# Patient Record
Sex: FEMALE | Race: BLACK OR AFRICAN AMERICAN | NOT HISPANIC OR LATINO | Employment: OTHER | URBAN - METROPOLITAN AREA
[De-identification: names, ages, dates, MRNs, and addresses within clinical notes are randomized per-mention and may not be internally consistent; named-entity substitution may affect disease eponyms.]

---

## 2017-01-19 ENCOUNTER — HOSPITAL ENCOUNTER (OUTPATIENT)
Facility: MEDICAL CENTER | Age: 72
End: 2017-01-19
Attending: NEUROLOGICAL SURGERY | Admitting: NEUROLOGICAL SURGERY
Payer: MEDICARE

## 2017-01-27 ENCOUNTER — HOSPITAL ENCOUNTER (OUTPATIENT)
Dept: RADIOLOGY | Facility: MEDICAL CENTER | Age: 72
End: 2017-01-27
Attending: NEUROLOGICAL SURGERY
Payer: MEDICARE

## 2017-01-27 ENCOUNTER — APPOINTMENT (OUTPATIENT)
Dept: ADMISSIONS | Facility: MEDICAL CENTER | Age: 72
End: 2017-01-27
Payer: MEDICARE

## 2017-01-27 VITALS — HEIGHT: 65 IN | BODY MASS INDEX: 23.84 KG/M2 | WEIGHT: 143.08 LBS

## 2017-01-27 DIAGNOSIS — Z01.812 PRE-PROCEDURAL LABORATORY EXAMINATION: ICD-10-CM

## 2017-01-27 DIAGNOSIS — Z01.811 PRE-OPERATIVE RESPIRATORY EXAMINATION: ICD-10-CM

## 2017-01-27 DIAGNOSIS — Z01.810 PRE-OPERATIVE CARDIOVASCULAR EXAMINATION: ICD-10-CM

## 2017-01-27 LAB
25(OH)D3 SERPL-MCNC: 27 NG/ML (ref 30–100)
ANION GAP SERPL CALC-SCNC: 6 MMOL/L (ref 0–11.9)
APPEARANCE UR: CLEAR
APTT PPP: 24.3 SEC (ref 24.7–36)
BASOPHILS # BLD AUTO: 1 % (ref 0–1.8)
BASOPHILS # BLD: 0.04 K/UL (ref 0–0.12)
BILIRUB UR QL STRIP.AUTO: NEGATIVE
BUN SERPL-MCNC: 13 MG/DL (ref 8–22)
CALCIUM SERPL-MCNC: 9 MG/DL (ref 8.5–10.5)
CHLORIDE SERPL-SCNC: 103 MMOL/L (ref 96–112)
CO2 SERPL-SCNC: 25 MMOL/L (ref 20–33)
COLOR UR: COLORLESS
CREAT SERPL-MCNC: 0.94 MG/DL (ref 0.5–1.4)
CULTURE IF INDICATED INDCX: NO UA CULTURE
EKG IMPRESSION: NORMAL
EOSINOPHIL # BLD AUTO: 0.18 K/UL (ref 0–0.51)
EOSINOPHIL NFR BLD: 4.5 % (ref 0–6.9)
ERYTHROCYTE [DISTWIDTH] IN BLOOD BY AUTOMATED COUNT: 40.6 FL (ref 35.9–50)
GFR SERPL CREATININE-BSD FRML MDRD: 59 ML/MIN/1.73 M 2
GLUCOSE SERPL-MCNC: 281 MG/DL (ref 65–99)
GLUCOSE UR STRIP.AUTO-MCNC: >1000 MG/DL
HCT VFR BLD AUTO: 38.6 % (ref 37–47)
HGB BLD-MCNC: 13.2 G/DL (ref 12–16)
IMM GRANULOCYTES # BLD AUTO: 0.01 K/UL (ref 0–0.11)
IMM GRANULOCYTES NFR BLD AUTO: 0.2 % (ref 0–0.9)
INR PPP: 1 (ref 0.87–1.13)
KETONES UR STRIP.AUTO-MCNC: NEGATIVE MG/DL
LEUKOCYTE ESTERASE UR QL STRIP.AUTO: NEGATIVE
LYMPHOCYTES # BLD AUTO: 1.7 K/UL (ref 1–4.8)
LYMPHOCYTES NFR BLD: 42.3 % (ref 22–41)
MCH RBC QN AUTO: 31.4 PG (ref 27–33)
MCHC RBC AUTO-ENTMCNC: 34.2 G/DL (ref 33.6–35)
MCV RBC AUTO: 91.9 FL (ref 81.4–97.8)
MICRO URNS: ABNORMAL
MONOCYTES # BLD AUTO: 0.34 K/UL (ref 0–0.85)
MONOCYTES NFR BLD AUTO: 8.5 % (ref 0–13.4)
NEUTROPHILS # BLD AUTO: 1.75 K/UL (ref 2–7.15)
NEUTROPHILS NFR BLD: 43.5 % (ref 44–72)
NITRITE UR QL STRIP.AUTO: NEGATIVE
NRBC # BLD AUTO: 0 K/UL
NRBC BLD AUTO-RTO: 0 /100 WBC
PH UR STRIP.AUTO: 7 [PH]
PLATELET # BLD AUTO: 298 K/UL (ref 164–446)
PMV BLD AUTO: 11.3 FL (ref 9–12.9)
POTASSIUM SERPL-SCNC: 3.8 MMOL/L (ref 3.6–5.5)
PROT UR QL STRIP: NEGATIVE MG/DL
PROTHROMBIN TIME: 13.5 SEC (ref 12–14.6)
RBC # BLD AUTO: 4.2 M/UL (ref 4.2–5.4)
RBC UR QL AUTO: NEGATIVE
SODIUM SERPL-SCNC: 134 MMOL/L (ref 135–145)
SP GR UR STRIP.AUTO: 1.02
WBC # BLD AUTO: 4 K/UL (ref 4.8–10.8)

## 2017-01-27 PROCEDURE — 82306 VITAMIN D 25 HYDROXY: CPT

## 2017-01-27 PROCEDURE — 85610 PROTHROMBIN TIME: CPT

## 2017-01-27 PROCEDURE — 85025 COMPLETE CBC W/AUTO DIFF WBC: CPT

## 2017-01-27 PROCEDURE — 85730 THROMBOPLASTIN TIME PARTIAL: CPT

## 2017-01-27 PROCEDURE — 80048 BASIC METABOLIC PNL TOTAL CA: CPT

## 2017-01-27 PROCEDURE — 81003 URINALYSIS AUTO W/O SCOPE: CPT

## 2017-01-27 PROCEDURE — 36415 COLL VENOUS BLD VENIPUNCTURE: CPT

## 2017-01-27 PROCEDURE — 71010 DX-CHEST-LIMITED (1 VIEW): CPT

## 2017-01-27 RX ORDER — LOVASTATIN 40 MG/1
40 TABLET ORAL NIGHTLY
COMMUNITY
End: 2017-02-03 | Stop reason: HOSPADM

## 2017-01-27 RX ORDER — GABAPENTIN 300 MG/1
300 CAPSULE ORAL 2 TIMES DAILY
COMMUNITY
End: 2017-01-27

## 2017-01-27 RX ORDER — SAXAGLIPTIN 5 MG/1
5 TABLET, FILM COATED ORAL DAILY
COMMUNITY
End: 2017-01-27

## 2017-01-27 RX ORDER — AMOXICILLIN AND CLAVULANATE POTASSIUM 875; 125 MG/1; MG/1
1 TABLET, FILM COATED ORAL 2 TIMES DAILY
COMMUNITY
End: 2017-01-27

## 2017-01-27 RX ORDER — PANTOPRAZOLE SODIUM 40 MG/1
40 TABLET, DELAYED RELEASE ORAL DAILY
COMMUNITY
End: 2017-02-03 | Stop reason: HOSPADM

## 2017-04-25 ENCOUNTER — OFFICE VISIT (OUTPATIENT)
Dept: ENDOCRINOLOGY | Facility: MEDICAL CENTER | Age: 72
End: 2017-04-25
Payer: MEDICARE

## 2017-04-25 VITALS
WEIGHT: 139 LBS | HEIGHT: 65 IN | OXYGEN SATURATION: 97 % | BODY MASS INDEX: 23.16 KG/M2 | HEART RATE: 108 BPM | SYSTOLIC BLOOD PRESSURE: 102 MMHG | DIASTOLIC BLOOD PRESSURE: 64 MMHG

## 2017-04-25 DIAGNOSIS — E11.00 UNCONTROLLED TYPE 2 DIABETES MELLITUS WITH HYPEROSMOLARITY WITHOUT COMA, WITHOUT LONG-TERM CURRENT USE OF INSULIN (HCC): ICD-10-CM

## 2017-04-25 DIAGNOSIS — E78.5 TYPE 2 DIABETES MELLITUS WITH HYPERLIPIDEMIA (HCC): ICD-10-CM

## 2017-04-25 DIAGNOSIS — E11.69 TYPE 2 DIABETES MELLITUS WITH HYPERLIPIDEMIA (HCC): ICD-10-CM

## 2017-04-25 PROCEDURE — G8420 CALC BMI NORM PARAMETERS: HCPCS | Performed by: PHYSICIAN ASSISTANT

## 2017-04-25 PROCEDURE — 3017F COLORECTAL CA SCREEN DOC REV: CPT | Performed by: PHYSICIAN ASSISTANT

## 2017-04-25 PROCEDURE — 1036F TOBACCO NON-USER: CPT | Performed by: PHYSICIAN ASSISTANT

## 2017-04-25 PROCEDURE — 3046F HEMOGLOBIN A1C LEVEL >9.0%: CPT | Mod: 8P | Performed by: PHYSICIAN ASSISTANT

## 2017-04-25 PROCEDURE — 1101F PT FALLS ASSESS-DOCD LE1/YR: CPT | Mod: 8P | Performed by: PHYSICIAN ASSISTANT

## 2017-04-25 PROCEDURE — 3014F SCREEN MAMMO DOC REV: CPT | Mod: 8P | Performed by: PHYSICIAN ASSISTANT

## 2017-04-25 PROCEDURE — 4040F PNEUMOC VAC/ADMIN/RCVD: CPT | Mod: 8P | Performed by: PHYSICIAN ASSISTANT

## 2017-04-25 PROCEDURE — 99214 OFFICE O/P EST MOD 30 MIN: CPT | Performed by: PHYSICIAN ASSISTANT

## 2017-04-25 RX ORDER — LOVASTATIN 40 MG/1
40 TABLET ORAL NIGHTLY
Status: ON HOLD | COMMUNITY
End: 2017-11-08

## 2017-04-25 RX ORDER — SAXAGLIPTIN AND METFORMIN HYDROCHLORIDE 1000; 5 MG/1; MG/1
TABLET, FILM COATED, EXTENDED RELEASE ORAL
COMMUNITY
End: 2017-05-03

## 2017-04-25 RX ORDER — PANTOPRAZOLE SODIUM 40 MG/1
40 TABLET, DELAYED RELEASE ORAL EVERY MORNING
COMMUNITY
End: 2018-08-13

## 2017-04-25 NOTE — PATIENT INSTRUCTIONS
Blood glucose log: Check BG in the morning when wake up, before lunch or dinner and before bed.  So three times a day.  Always bring BG diary to the next office visit.     STOP:  Kombiglyza  Glipizide    Start:  Xigduo one in AM one in PM  Victoza 0.6 at night

## 2017-04-25 NOTE — PROGRESS NOTES
New Patient Consult Note  Referred by: Parveen Barahona M.D.    Reason for consult: Diabetes Management Type 2    HPI:  Margy Boyer is a 72 y.o. old patient who is seeing us today for diabetes care.  This is a pleasant patient with diabetes and I appreciate the opportunity to participate in the care of this patient.  This is a new patient with me today.    2/12/17 HbA1c 12.9,  GFR 59    BG Diary:4/25/2017  In the AM: 135, 116, 123, 115, 142, 132, 124  Just before meal: 137, 216, 179, 59, 120, 138,   Just before Bed: 150, 280, 207, 218, 193, 174   Has been Diabetic [id number]  Has a Glucagon pen at home: no    Meter is a Freestyle.    1. Uncontrolled type 2 diabetes mellitus with hyperosmolarity without coma, without long-term current use of insulin (CMS-HCC)  She is new and is on   Kombiglyze XR 5/1000  Glipizide 5    STOP:  Kombiglyza  Glipizide    Start:  Xigduo one in AM one in PM  Victoza 0.6 at night      ROS:   Constitutional: No change in weight , No fatigue, No night sweats.  HEENT: No Headache.  Eyes:  No blurred vision, No visual changes.  Cardiac: No chest pain, No palpitations.  Resp: No shortness of breath, No cough,   Gastro: No nausea or vomiting, No diarrhea.  Neuro: Denies numbness or tinging in bilateral feet or hands, and no loss of sensation.  Endo: No heat or cold intolerance.  : + polyuria, No polydipsia, No chronic UTI's.  Lower extremities: No lower leg edema bilateral.  All other systems were reviewed and were negative.    Past Medical History:  Patient Active Problem List    Diagnosis Date Noted   • Diabetes mellitus type II, uncontrolled (CMS-HCC) 04/25/2017   • Type 2 diabetes mellitus with hyperlipidemia (CMS-HCC) 04/25/2017   • Chronic low back pain 09/29/2016   • DDD (degenerative disc disease), lumbar 09/29/2016   • Spondylosis of lumbosacral region without myelopathy or radiculopathy 09/29/2016   • Chronic neck pain 09/29/2016   • Osteoarthritis of spine with radiculopathy,  cervical region 09/29/2016   • DDD (degenerative disc disease), cervical 09/29/2016   • Muscle weakness of left upper extremity 09/29/2016   • Cervical stenosis of spinal canal 09/29/2016   • Left hand pain 06/10/2015   • Contracture of joint of hand 01/05/2015   • Fracture of metacarpal bone 12/23/2013       Past Surgical History:  Past Surgical History   Procedure Laterality Date   • Gyn surgery       tubal ligation   • Other orthopedic surgery  ?2012     Right knee       Allergies:  Asa; Celecoxib; Codeine; Eggs; Pcn; Tetracycline; and Vioxx    Social History:  Social History     Social History   • Marital Status: Single     Spouse Name: N/A   • Number of Children: N/A   • Years of Education: N/A     Occupational History   • Not on file.     Social History Main Topics   • Smoking status: Never Smoker    • Smokeless tobacco: Never Used   • Alcohol Use: No   • Drug Use: No   • Sexual Activity: Not on file     Other Topics Concern   • Not on file     Social History Narrative       Family History:  History reviewed. No pertinent family history.    Medications:    Current outpatient prescriptions:   •  lovastatin (MEVACOR) 40 MG tablet, Take 40 mg by mouth every evening., Disp: , Rfl:   •  Saxagliptin-Metformin (KOMBIGLYZE XR) 5-1000 MG TABLET SR 24 HR, Take  by mouth., Disp: , Rfl:   •  pantoprazole (PROTONIX) 40 MG Tablet Delayed Response, Take 40 mg by mouth every day., Disp: , Rfl:   •  glipiZIDE (GLUCOTROL) 5 MG Tab, Take 5 mg by mouth 2 times a day., Disp: , Rfl:   •  fluticasone-salmeterol (ADVAIR) 250-50 MCG/DOSE AEROSOL POWDER, BREATH ACTIVATED, Inhale 1 Puff by mouth every 12 hours., Disp: , Rfl:   •  losartan-hydrochlorothiazide (HYZAAR) 100-12.5 MG per tablet, Take 1 Tab by mouth every day., Disp: , Rfl:   •  albuterol (VENTOLIN OR PROVENTIL) 108 (90 BASE) MCG/ACT Aero Soln inhalation aerosol, Inhale 2 Puffs by mouth every 6 hours as needed for Shortness of Breath., Disp: 8.5 g, Rfl: 3  •  metformin  "(GLUCOPHAGE) 500 MG TABS, Take  by mouth. 1000 mg with morning and evening meals, and 500 mg at noon, Disp: , Rfl:       Physical Examination:   Vital signs: /64 mmHg  Pulse 108  Ht 1.651 m (5' 5\")  Wt 63.05 kg (139 lb)  BMI 23.13 kg/m2  SpO2 97%  General: No distress, cooperative, well dressed and well nourished.   Eyes: No scleral icterus or discharge, No hyposphagma  ENMT: Normal on external inspection of nose, lips, No nasal drainage   Neck: No abnormal masses on inspection  Resp: Normal effort, Bilateral clear to auscultation, No wheezing, No rales  CVS: Regular rate and rhythm, S1 S2 normal, No murmur. No gallop  Extremities: No edema bilateral extremities  Neuro: Alert and oriented  Skin: No rash, No Ulcers  Psych: Normal mood and affect  Foot exam: normal sensation to monofilament testing, normal pulses, no ulcers.  Normal Vibration quantitative sensation test.    Assessment and Plan:    1. Uncontrolled type 2 diabetes mellitus with hyperosmolarity without coma, without long-term current use of insulin (CMS-HCC)  STOP:  Kombiglyza  Glipizide    Start:  Xigduo one in AM one in PM  Victoza 0.6 at night    I may or may not get her on Actos depending on her numbers next week.    She also states she needs to get her Diabetes under better control so she can do surgery on her neck.       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   They also have polyuria which precludes them to move toward renal failure and possible dialysis.  I discussed today with this patient that the leading cause of adult blindness and renal failure and the need for dialysis is uncontrolled diabetes and a glucose-toxic state.  I explained to them the need to get a better handle of their diabetes, because we want to avoid complications if at all possible.   The total time spent seeing this " patient today face to face in consultation, and formulating an action plan for this visit was greater than 40 minutes. > Than 50% of this time was spent counseling, discussing problems documented above and below, coordinating care and answering questions by the physician assistant.  We developed a diabetes care plan for this patient today.        Return in about 1 week (around 5/2/2017).    Blood glucose log: Check BG in the morning when wake up, before lunch or dinner and before bed.  So three times a day.  Always bring BG diary to the next office visit.     This patient during there office visit was started on new medication Victoza, Xigduo.  Side effects of new medications were discussed with the patient today in the office. The patient was supplied paperwork on this new medication.    Thank you kindly for allowing me to participate in the diabetes care plan for this patient.    Martin Leblanc PA-C, [facility]  04/25/2017    CC:   Parveen Barahona M.D.

## 2017-04-25 NOTE — MR AVS SNAPSHOT
"        Margy Boyer   2017 9:50 AM   Office Visit   MRN: [id number]    Department:  Endocrinology Med [facility]   Dept Phone:  [phone]    Description:  Female : 1945   Provider:  Yoshi Leblanc PA-C           Reason for Visit     New Patient           Allergies as of 2017     Allergen Noted Reactions    Asa [Aspirin] 2013       Celecoxib 2013       Codeine 2017       Eggs 2017       Pcn [Penicillins] 2013       Tetracycline 2013       Vioxx 2013         You were diagnosed with     Uncontrolled type 2 diabetes mellitus with hyperosmolarity without coma, without long-term current use of insulin (CMS-HCC)   [3139829]       Type 2 diabetes mellitus with hyperlipidemia (CMS-HCC)   [9995904]         Vital Signs     Blood Pressure Pulse Height Weight Body Mass Index Oxygen Saturation    102/64 mmHg 108 1.651 m (5' 5\") 63.05 kg (139 lb) 23.13 kg/m2 97%    Smoking Status                   Never Smoker            Basic Information     Date Of Birth Sex Race Ethnicity Preferred Language    1945 Female Black or  Non- English      Your appointments     May 03, 2017 11:10 AM   Established Patient with Yoshi Leblanc PA-C   [facility]    [address] [place] [address], [address]  [facility] [address] [phone]           You will be receiving a confirmation call a few days before your appointment from our automated call confirmation system.              Problem List              ICD-10-CM Priority Class Noted - Resolved    Fracture of metacarpal bone S62.309A   2013 - Present    Contracture of joint of hand M24.549   2015 - Present    Left hand pain M79.642   6/10/2015 - Present    Chronic low back pain M54.5, G89.29   2016 - Present    DDD (degenerative disc disease), lumbar M51.36   2016 - Present    Spondylosis of lumbosacral region without myelopathy or radiculopathy " M47.817   9/29/2016 - Present    Chronic neck pain M54.2, G89.29   9/29/2016 - Present    Osteoarthritis of spine with radiculopathy, cervical region M47.22   9/29/2016 - Present    DDD (degenerative disc disease), cervical M50.30   9/29/2016 - Present    Muscle weakness of left upper extremity M62.81   9/29/2016 - Present    Cervical stenosis of spinal canal M48.02   9/29/2016 - Present    Diabetes mellitus type II, uncontrolled (CMS-HCC) E11.65   4/25/2017 - Present    Type 2 diabetes mellitus with hyperlipidemia (CMS-HCC) E11.69, E78.5   4/25/2017 - Present      Health Maintenance        Date Due Completion Dates    IMM DTaP/Tdap/Td Vaccine (1 - Tdap) 4/16/1965 ---    PAP SMEAR 4/16/1967 ---    MAMMOGRAM 4/16/1986 ---    COLONOSCOPY 4/16/1996 ---    IMM ZOSTER VACCINE 4/16/2006 ---    BONE DENSITY 4/16/2011 ---    IMM PNEUMOCOCCAL 65+ (ADULT) LOW/MEDIUM RISK SERIES (1 of 2 - PCV13) 4/16/2011 ---            Current Immunizations     No immunizations on file.      Below and/or attached are the medications your provider expects you to take. Review all of your home medications and newly ordered medications with your provider and/or pharmacist. Follow medication instructions as directed by your provider and/or pharmacist. Please keep your medication list with you and share with your provider. Update the information when medications are discontinued, doses are changed, or new medications (including over-the-counter products) are added; and carry medication information at all times in the event of emergency situations     Allergies:  ASA - (reactions not documented)     CELECOXIB - (reactions not documented)     CODEINE - (reactions not documented)     EGGS - (reactions not documented)     PCN - (reactions not documented)     TETRACYCLINE - (reactions not documented)     VIOXX - (reactions not documented)               Medications  Valid as of: April 25, 2017 - 10:23 AM    Generic Name Brand Name Tablet Size  Instructions for use    Albuterol Sulfate (Aero Soln) albuterol 108 (90 BASE) MCG/ACT Inhale 2 Puffs by mouth every 6 hours as needed for Shortness of Breath.        Fluticasone-Salmeterol (AEROSOL POWDER, BREATH ACTIVATED) ADVAIR 250-50 MCG/DOSE Inhale 1 Puff by mouth every 12 hours.        GlipiZIDE (Tab) GLUCOTROL 5 MG Take 5 mg by mouth 2 times a day.        Losartan Potassium-HCTZ (Tab) HYZAAR 100-12.5 MG Take 1 Tab by mouth every day.        Lovastatin (Tab) MEVACOR 40 MG Take 40 mg by mouth every evening.        MetFORMIN HCl (Tab) GLUCOPHAGE 500 MG Take  by mouth. 1000 mg with morning and evening meals, and 500 mg at noon        Pantoprazole Sodium (Tablet Delayed Response) PROTONIX 40 MG Take 40 mg by mouth every day.        SAXagliptin-MetFORMIN (TABLET SR 24 HR) Saxagliptin-Metformin 5-1000 MG Take  by mouth.        .                 Medicines prescribed today were sent to:     [address] ([address]), [address]) [place] [address]    Phone: [phone] Fax: [phone]    Open 24 Hours?: No      Medication refill instructions:       If your prescription bottle indicates you have medication refills left, it is not necessary to call your provider’s office. Please contact your pharmacy and they will refill your medication.    If your prescription bottle indicates you do not have any refills left, you may request refills at any time through one of the following ways: The online [employer] system (except Urgent Care), by calling your provider’s office, or by asking your pharmacy to contact your provider’s office with a refill request. Medication refills are processed only during regular business hours and may not be available until the next business day. Your provider may request additional information or to have a follow-up visit with you prior to refilling your medication.   *Please Note: Medication refills are assigned a new Rx number when refilled  electronically. Your pharmacy may indicate that no refills were authorized even though a new prescription for the same medication is available at the pharmacy. Please request the medicine by name with the pharmacy before contacting your provider for a refill.        Instructions    Blood glucose log: Check BG in the morning when wake up, before lunch or dinner and before bed.  So three times a day.  Always bring BG diary to the next office visit.     STOP:  Kombiglyza  Glipizide    Start:  Xigduo one in AM one in PM  Victoza 0.6 at night            [employer] Access Code: [email or web address]  Expires: 5/25/2017 10:23 AM    [employer]  A secure, online tool to manage your health information     [employer]’s [employer]® is a secure, online tool that connects you to your personalized health information from the privacy of your home -- day or night - making it very easy for you to manage your healthcare. Once the activation process is completed, you can even access your medical information using the [employer] parmjit, which is available for free in the Apple Parmjit store or Google Play store.     [employer] provides the following levels of access (as shown below):   My Chart Features   [facility] Doctor [facility]  Specialists [facility]  Urgent  Care Non-[facility]  Primary Care  Doctor   Email your healthcare team securely and privately 24/7 X X X    Manage appointments: schedule your next appointment; view details of past/upcoming appointments X      Request prescription refills. X      View recent personal medical records, including lab and immunizations X X X X   View health record, including health history, allergies, medications X X X X   Read reports about your outpatient visits, procedures, consult and ER notes X X X X   See your discharge summary, which is a recap of your hospital and/or ER visit that includes your diagnosis, lab results, and care plan. X X       How to register for [employer]:  1. Go to   [email or web address].  2. Click on the Sign Up Now box, which takes you to the New Member Sign Up page. You will need to provide the following information:  a. Enter your [employer] Access Code exactly as it appears at the top of this page. (You will not need to use this code after you’ve completed the sign-up process. If you do not sign up before the expiration date, you must request a new code.)   b. Enter your date of birth.   c. Enter your home email address.   d. Click Submit, and follow the next screen’s instructions.  3. Create a [employer] ID. This will be your [employer] login ID and cannot be changed, so think of one that is secure and easy to remember.  4. Create a [employer] password. You can change your password at any time.  5. Enter your Password Reset Question and Answer. This can be used at a later time if you forget your password.   6. Enter your e-mail address. This allows you to receive e-mail notifications when new information is available in [employer].  7. Click Sign Up. You can now view your health information.    For assistance activating your [employer] account, call [phone]

## 2017-04-26 ENCOUNTER — TELEPHONE (OUTPATIENT)
Dept: ENDOCRINOLOGY | Facility: MEDICAL CENTER | Age: 72
End: 2017-04-26

## 2017-04-26 NOTE — TELEPHONE ENCOUNTER
Pt called states after taking Xigduo this morning, she started feeling nauseated and vomited. Still with nausea. Pt asking for advise.

## 2017-04-26 NOTE — TELEPHONE ENCOUNTER
GEOVANY Shafer, Med Ass't       Caller: Unspecified (Today, 8:29 AM)                     I talked with the patient to take the Xigduo with meals     duy

## 2017-05-03 ENCOUNTER — OFFICE VISIT (OUTPATIENT)
Dept: ENDOCRINOLOGY | Facility: MEDICAL CENTER | Age: 72
End: 2017-05-03
Payer: MEDICARE

## 2017-05-03 VITALS
DIASTOLIC BLOOD PRESSURE: 60 MMHG | HEART RATE: 115 BPM | WEIGHT: 134 LBS | HEIGHT: 65 IN | SYSTOLIC BLOOD PRESSURE: 102 MMHG | BODY MASS INDEX: 22.33 KG/M2 | OXYGEN SATURATION: 98 %

## 2017-05-03 DIAGNOSIS — E78.5 TYPE 2 DIABETES MELLITUS WITH HYPERLIPIDEMIA (HCC): ICD-10-CM

## 2017-05-03 DIAGNOSIS — E11.00 UNCONTROLLED TYPE 2 DIABETES MELLITUS WITH HYPEROSMOLARITY WITHOUT COMA, UNSPECIFIED LONG TERM INSULIN USE STATUS: ICD-10-CM

## 2017-05-03 DIAGNOSIS — E11.69 TYPE 2 DIABETES MELLITUS WITH HYPERLIPIDEMIA (HCC): ICD-10-CM

## 2017-05-03 PROCEDURE — 3046F HEMOGLOBIN A1C LEVEL >9.0%: CPT | Mod: 8P | Performed by: PHYSICIAN ASSISTANT

## 2017-05-03 PROCEDURE — 3014F SCREEN MAMMO DOC REV: CPT | Mod: 8P | Performed by: PHYSICIAN ASSISTANT

## 2017-05-03 PROCEDURE — G8420 CALC BMI NORM PARAMETERS: HCPCS | Performed by: PHYSICIAN ASSISTANT

## 2017-05-03 PROCEDURE — 99214 OFFICE O/P EST MOD 30 MIN: CPT | Performed by: PHYSICIAN ASSISTANT

## 2017-05-03 PROCEDURE — 1101F PT FALLS ASSESS-DOCD LE1/YR: CPT | Mod: 8P | Performed by: PHYSICIAN ASSISTANT

## 2017-05-03 PROCEDURE — 4040F PNEUMOC VAC/ADMIN/RCVD: CPT | Mod: 8P | Performed by: PHYSICIAN ASSISTANT

## 2017-05-03 PROCEDURE — 1036F TOBACCO NON-USER: CPT | Performed by: PHYSICIAN ASSISTANT

## 2017-05-03 PROCEDURE — 3017F COLORECTAL CA SCREEN DOC REV: CPT | Performed by: PHYSICIAN ASSISTANT

## 2017-05-03 RX ORDER — LIRAGLUTIDE 6 MG/ML
1.8 INJECTION SUBCUTANEOUS DAILY
Qty: 3 PEN | Refills: 6 | Status: SHIPPED | OUTPATIENT
Start: 2017-05-03 | End: 2017-05-15 | Stop reason: SDUPTHER

## 2017-05-03 NOTE — PATIENT INSTRUCTIONS
Start:  Xigduo 5/500  STOP and change to 10/500 one a day  Victoza 0.6 at night No change in dose      Losartan 100/12.5 take 1/2 tablet a day

## 2017-05-03 NOTE — MR AVS SNAPSHOT
"        Margy HUFF Merlin   5/3/2017 11:10 AM   Office Visit   MRN: [id number]    Department:  Endocrinology [facility]   Dept Phone:  [phone]    Description:  Female : 1945   Provider:  Yoshi Leblanc PA-C           Reason for Visit     Follow-Up           Allergies as of 5/3/2017     Allergen Noted Reactions    Asa [Aspirin] 2013       Celecoxib 2013       Codeine 2017       Eggs 2017       Pcn [Penicillins] 2013       Tetracycline 2013       Vioxx 2013         You were diagnosed with     Uncontrolled type 2 diabetes mellitus with hyperosmolarity without coma, unspecified long term insulin use status (CMS-HCC)   [9219093]       Type 2 diabetes mellitus with hyperlipidemia (CMS-HCC)   [4568803]         Vital Signs     Blood Pressure Pulse Height Weight Body Mass Index Oxygen Saturation    102/60 mmHg 115 1.651 m (5' 5\") 60.782 kg (134 lb) 22.30 kg/m2 98%    Smoking Status                   Never Smoker            Basic Information     Date Of Birth Sex Race Ethnicity Preferred Language    1945 Female Black or  Non- English      Your appointments     May 11, 2017 12:40 PM   Established Patient with Yoshi Leblanc PA-C   [facility]    [address] [place] [address], [address]  [facility] [address] [phone]           You will be receiving a confirmation call a few days before your appointment from our automated call confirmation system.              Problem List              ICD-10-CM Priority Class Noted - Resolved    Fracture of metacarpal bone S62.309A   2013 - Present    Contracture of joint of hand M24.549   2015 - Present    Left hand pain M79.642   6/10/2015 - Present    Chronic low back pain M54.5, G89.29   2016 - Present    DDD (degenerative disc disease), lumbar M51.36   2016 - Present    Spondylosis of lumbosacral region without myelopathy or radiculopathy M47.817 "   9/29/2016 - Present    Chronic neck pain M54.2, G89.29   9/29/2016 - Present    Osteoarthritis of spine with radiculopathy, cervical region M47.22   9/29/2016 - Present    DDD (degenerative disc disease), cervical M50.30   9/29/2016 - Present    Muscle weakness of left upper extremity M62.81   9/29/2016 - Present    Cervical stenosis of spinal canal M48.02   9/29/2016 - Present    Diabetes mellitus type II, uncontrolled (CMS-HCC) E11.65   4/25/2017 - Present    Type 2 diabetes mellitus with hyperlipidemia (CMS-HCC) E11.69, E78.5   4/25/2017 - Present      Health Maintenance        Date Due Completion Dates    A1C SCREENING 1945 ---    DIABETES MONOFILAMENT / LE EXAM 1945 ---    FASTING LIPID PROFILE 4/16/1964 ---    URINE ACR / MICROALBUMIN 4/16/1964 ---    IMM DTaP/Tdap/Td Vaccine (1 - Tdap) 4/16/1965 ---    PAP SMEAR 4/16/1967 ---    MAMMOGRAM 4/16/1986 ---    COLONOSCOPY 4/16/1996 ---    IMM ZOSTER VACCINE 4/16/2006 ---    BONE DENSITY 4/16/2011 ---    IMM PNEUMOCOCCAL 65+ (ADULT) LOW/MEDIUM RISK SERIES (1 of 2 - PCV13) 4/16/2011 ---    SERUM CREATININE 1/27/2018 1/27/2017    RETINAL SCREENING 3/16/2018 3/16/2017            Current Immunizations     No immunizations on file.      Below and/or attached are the medications your provider expects you to take. Review all of your home medications and newly ordered medications with your provider and/or pharmacist. Follow medication instructions as directed by your provider and/or pharmacist. Please keep your medication list with you and share with your provider. Update the information when medications are discontinued, doses are changed, or new medications (including over-the-counter products) are added; and carry medication information at all times in the event of emergency situations     Allergies:  ASA - (reactions not documented)     CELECOXIB - (reactions not documented)     CODEINE - (reactions not documented)     EGGS - (reactions not documented)      PCN - (reactions not documented)     TETRACYCLINE - (reactions not documented)     VIOXX - (reactions not documented)               Medications  Valid as of: May 03, 2017 - 11:37 AM    Generic Name Brand Name Tablet Size Instructions for use    Albuterol Sulfate (Aero Soln) albuterol 108 (90 BASE) MCG/ACT Inhale 2 Puffs by mouth every 6 hours as needed for Shortness of Breath.        Dapagliflozin-Metformin HCl (TABLET SR 24 HR) Dapagliflozin-Metformin HCl ER 5-500 MG Take 1 Tab by mouth 2 Times a Day.        Fluticasone-Salmeterol (AEROSOL POWDER, BREATH ACTIVATED) ADVAIR 250-50 MCG/DOSE Inhale 1 Puff by mouth every 12 hours.        Glucose Blood (Strip) glucose blood  1 Strip by Other route as needed.        Liraglutide (Solution Pen-injector) VICTOZA 18 MG/3ML Inject 0.3 mL as instructed every day.        Losartan Potassium-HCTZ (Tab) HYZAAR 100-12.5 MG Take 1 Tab by mouth every day.        Lovastatin (Tab) MEVACOR 40 MG Take 40 mg by mouth every evening.        Pantoprazole Sodium (Tablet Delayed Response) PROTONIX 40 MG Take 40 mg by mouth every day.        .                 Medicines prescribed today were sent to:     [facility] [address] [place] [address] (N), [address] (N) [place] [address]    Phone: [phone] Fax: [phone]    Open 24 Hours?: No      Medication refill instructions:       If your prescription bottle indicates you have medication refills left, it is not necessary to call your provider’s office. Please contact your pharmacy and they will refill your medication.    If your prescription bottle indicates you do not have any refills left, you may request refills at any time through one of the following ways: The online [employer] system (except Urgent Care), by calling your provider’s office, or by asking your pharmacy to contact your provider’s office with a refill request. Medication refills are processed only during regular business hours and may not be  available until the next business day. Your provider may request additional information or to have a follow-up visit with you prior to refilling your medication.   *Please Note: Medication refills are assigned a new Rx number when refilled electronically. Your pharmacy may indicate that no refills were authorized even though a new prescription for the same medication is available at the pharmacy. Please request the medicine by name with the pharmacy before contacting your provider for a refill.        Instructions      Start:  Xigduo 5/500  STOP and change to 10/500 one a day  Victoza 0.6 at night No change in dose      Losartan 100/12.5 take 1/2 tablet a day            GoodRx Access Code: [email or web address]  Expires: 5/25/2017 10:23 AM    GoodRx  A secure, online tool to manage your health information     [employer]’s GoodRx® is a secure, online tool that connects you to your personalized health information from the privacy of your home -- day or night - making it very easy for you to manage your healthcare. Once the activation process is completed, you can even access your medical information using the GoodRx parmjit, which is available for free in the [employer] Parm[employer]it store or Google Play store.     GoodRx provides the following levels of access (as shown below):   My Chart Features   [facility] Doctor [facility]  Specialists [facility]  Urgent  Care Non-[facility]  Primary Care  Doctor   Email your healthcare team securely and privately 24/7 X X X    Manage appointments: schedule your next appointment; view details of past/upcoming appointments X      Request prescription refills. X      View recent personal medical records, including lab and immunizations X X X X   View health record, including health history, allergies, medications X X X X   Read reports about your outpatient visits, procedures, consult and ER notes X X X X   See your discharge summary, which is a recap of your hospital and/or ER visit that includes  your diagnosis, lab results, and care plan. X X       How to register for [employer].:  1. Go to  [email or web address].  2. Click on the Sign Up Now box, which takes you to the New Member Sign Up page. You will need to provide the following information:  a. Enter your [employer] Access Code exactly as it appears at the top of this page. (You will not need to use this code after you’ve completed the sign-up process. If you do not sign up before the expiration date, you must request a new code.)   b. Enter your date of birth.   c. Enter your home email address.   d. Click Submit, and follow the next screen’s instructions.  3. Create a [employer] ID. This will be your [employer] login ID and cannot be changed, so think of one that is secure and easy to remember.  4. Create a [employer] password. You can change your password at any time.  5. Enter your Password Reset Question and Answer. This can be used at a later time if you forget your password.   6. Enter your e-mail address. This allows you to receive e-mail notifications when new information is available in [employer]..  7. Click Sign Up. You can now view your health information.    For assistance activating your [employer]. account, call [phone]

## 2017-05-03 NOTE — PROGRESS NOTES
Return to office Patient Consult Note  Referred by: Parveen Barahona M.D.    Reason for consult: Diabetes Management Type 2    HPI:  Margy Boyer is a 72 y.o. old patient who is seeing us today for diabetes care.  This is a pleasant patient with diabetes and I appreciate the opportunity to participate in the care of this patient.    BG Diary:5/3/2017  In the AM: 140, 120, 106, 98, 104,   Before meal: 133, 95, 188, 134   Before Bed: 127, 113, 134, 105, 160     2/12/17 HbA1c 12.9,  GFR 59    BG Diary:4/25/2017  In the AM: 135, 116, 123, 115, 142, 132, 124  Just before meal: 137, 216, 179, 59, 120, 138,    Just before Bed: 150, 280, 207, 218, 193, 174      Weight:She was 139 on 4/25/17 and today is 134      1. Uncontrolled type 2 diabetes mellitus with hyperosmolarity without coma, unspecified long term insulin use status (CMS-HCC)    She was on  Kombiglyze XR 5/1000  Glipizide 5  I had her   STOP:  Kombiglyza  Glipizide    Start:  Xigduo one in AM one in PM  Victoza 0.6 at night    having diarrhea and does not feel well.      2. Type 2 diabetes mellitus with hyperlipidemia (CMS-HCC)  Her HTN is low.    ROS:   Constitutional: No night sweats.  Eyes:  No visual changes.  Cardiac: No chest pain, No palpitations or racing heart rate.  Resp: No shortness of breath, No cough,   Gi: No Diarrhea    All other systems were reviewed and were/are negative.  The ROS was revised/revisited during this office visit from the patients first office visit with me on 4/25/17 Please review the full ROS during the first office visit.    Past Medical History:  Patient Active Problem List    Diagnosis Date Noted   • Diabetes mellitus type II, uncontrolled (CMS-HCC) 04/25/2017   • Type 2 diabetes mellitus with hyperlipidemia (CMS-HCC) 04/25/2017   • Chronic low back pain 09/29/2016   • DDD (degenerative disc disease), lumbar 09/29/2016   • Spondylosis of lumbosacral region without myelopathy or radiculopathy 09/29/2016   • Chronic neck  pain 09/29/2016   • Osteoarthritis of spine with radiculopathy, cervical region 09/29/2016   • DDD (degenerative disc disease), cervical 09/29/2016   • Muscle weakness of left upper extremity 09/29/2016   • Cervical stenosis of spinal canal 09/29/2016   • Left hand pain 06/10/2015   • Contracture of joint of hand 01/05/2015   • Fracture of metacarpal bone 12/23/2013       Past Surgical History:  Past Surgical History   Procedure Laterality Date   • Gyn surgery       tubal ligation   • Other orthopedic surgery  ?2012     Right knee       Allergies:  Asa; Celecoxib; Codeine; Eggs; Pcn; Tetracycline; and Vioxx    Social History:  Social History     Social History   • Marital Status: Single     Spouse Name: N/A   • Number of Children: N/A   • Years of Education: N/A     Occupational History   • Not on file.     Social History Main Topics   • Smoking status: Never Smoker    • Smokeless tobacco: Never Used   • Alcohol Use: No   • Drug Use: No   • Sexual Activity: Not on file     Other Topics Concern   • Not on file     Social History Narrative       Family History:  History reviewed. No pertinent family history.    Medications:    Current outpatient prescriptions:   •  VICTOZA 18 MG/3ML Solution Pen-injector injection, Inject 0.3 mL as instructed every day., Disp: 3 PEN, Rfl: 6  •  Dapagliflozin-Metformin HCl ER 5-500 MG TABLET SR 24 HR, Take 1 Tab by mouth 2 Times a Day., Disp: 30 Tab, Rfl: 6  •  lovastatin (MEVACOR) 40 MG tablet, Take 40 mg by mouth every evening., Disp: , Rfl:   •  pantoprazole (PROTONIX) 40 MG Tablet Delayed Response, Take 40 mg by mouth every day., Disp: , Rfl:   •  fluticasone-salmeterol (ADVAIR) 250-50 MCG/DOSE AEROSOL POWDER, BREATH ACTIVATED, Inhale 1 Puff by mouth every 12 hours., Disp: , Rfl:   •  albuterol (VENTOLIN OR PROVENTIL) 108 (90 BASE) MCG/ACT Aero Soln inhalation aerosol, Inhale 2 Puffs by mouth every 6 hours as needed for Shortness of Breath., Disp: 8.5 g, Rfl: 3  •   "losartan-hydrochlorothiazide (HYZAAR) 100-12.5 MG per tablet, Take 1 Tab by mouth every day., Disp: , Rfl:         Physical Examination:   Vital signs: /60 mmHg  Pulse 115  Ht 1.651 m (5' 5\")  Wt 60.782 kg (134 lb)  BMI 22.30 kg/m2  SpO2 98%  General: No distress, cooperative, well dressed and well nourished.   Eyes: No scleral icterus or discharge, No hyposphagma  ENMT: Normal on external inspection of nose, lips, No nasal drainage   Neck: No abnormal masses on inspection  Resp: Normal effort, Bilateral clear to auscultation, No wheezing, No rales  CVS: Regular rate and rhythm, S1 S2 normal, No murmur. No gallop  Extremities: No edema bilateral extremities  Neuro: Alert and oriented  Skin: No rash, No Ulcers  Psych: Normal mood and affect      Assessment and Plan:    1. Uncontrolled type 2 diabetes mellitus with hyperosmolarity without coma, unspecified long term insulin use status (CMS-[facility])  She was on  Kombiglyze XR 5/1000  Glipizide 5    I had her   STOP:  Kombiglyza  Glipizide    Start:  Xigduo 5/500  STOP and change to 10/500 one a day  Victoza 0.6 at night No change in dose      2. Type 2 diabetes mellitus with hyperlipidemia (CMS-[facility])  Losartan 100/12.5 take 1/2 tablet a day      Return in about 1 week (around 5/10/2017).    Blood glucose log: Check BG in the morning when wake up, before lunch or dinner and before bed.  So three times a day.  Always bring BG diary to the next office visit.     Thank you kindly for allowing me to participate in the diabetes care plan for this patient.    Martin Leblanc PA-C, BC-ADM  05/03/2017    CC:   Parveen Barahona M.D.      "

## 2017-05-15 ENCOUNTER — OFFICE VISIT (OUTPATIENT)
Dept: ENDOCRINOLOGY | Facility: MEDICAL CENTER | Age: 72
End: 2017-05-15
Payer: MEDICARE

## 2017-05-15 VITALS
HEIGHT: 65 IN | HEART RATE: 118 BPM | OXYGEN SATURATION: 96 % | BODY MASS INDEX: 22.99 KG/M2 | SYSTOLIC BLOOD PRESSURE: 114 MMHG | WEIGHT: 138 LBS | DIASTOLIC BLOOD PRESSURE: 66 MMHG

## 2017-05-15 DIAGNOSIS — E78.5 TYPE 2 DIABETES MELLITUS WITH HYPERLIPIDEMIA (HCC): ICD-10-CM

## 2017-05-15 DIAGNOSIS — E11.69 TYPE 2 DIABETES MELLITUS WITH HYPERLIPIDEMIA (HCC): ICD-10-CM

## 2017-05-15 PROCEDURE — 99214 OFFICE O/P EST MOD 30 MIN: CPT | Performed by: PHYSICIAN ASSISTANT

## 2017-05-15 PROCEDURE — 1101F PT FALLS ASSESS-DOCD LE1/YR: CPT | Mod: 8P | Performed by: PHYSICIAN ASSISTANT

## 2017-05-15 PROCEDURE — 3017F COLORECTAL CA SCREEN DOC REV: CPT | Performed by: PHYSICIAN ASSISTANT

## 2017-05-15 PROCEDURE — 4040F PNEUMOC VAC/ADMIN/RCVD: CPT | Mod: 8P | Performed by: PHYSICIAN ASSISTANT

## 2017-05-15 PROCEDURE — G8420 CALC BMI NORM PARAMETERS: HCPCS | Performed by: PHYSICIAN ASSISTANT

## 2017-05-15 PROCEDURE — 3014F SCREEN MAMMO DOC REV: CPT | Mod: 8P | Performed by: PHYSICIAN ASSISTANT

## 2017-05-15 PROCEDURE — 1036F TOBACCO NON-USER: CPT | Performed by: PHYSICIAN ASSISTANT

## 2017-05-15 PROCEDURE — 3046F HEMOGLOBIN A1C LEVEL >9.0%: CPT | Mod: 8P | Performed by: PHYSICIAN ASSISTANT

## 2017-05-15 RX ORDER — LIRAGLUTIDE 6 MG/ML
1.8 INJECTION SUBCUTANEOUS DAILY
Qty: 3 PEN | Refills: 6 | Status: SHIPPED | OUTPATIENT
Start: 2017-05-15 | End: 2017-05-15 | Stop reason: SDUPTHER

## 2017-05-15 RX ORDER — LOSARTAN POTASSIUM AND HYDROCHLOROTHIAZIDE 12.5; 5 MG/1; MG/1
1 TABLET ORAL DAILY
Qty: 30 TAB | Refills: 11 | Status: ON HOLD | OUTPATIENT
Start: 2017-05-15 | End: 2017-11-08

## 2017-05-15 RX ORDER — LIRAGLUTIDE 6 MG/ML
1.8 INJECTION SUBCUTANEOUS DAILY
Qty: 3 PEN | Refills: 6 | COMMUNITY
Start: 2017-05-15 | End: 2017-05-30 | Stop reason: SDUPTHER

## 2017-05-15 NOTE — MR AVS SNAPSHOT
"        Margy HUFF Merlin   5/15/2017 9:20 AM   Office Visit   MRN: [id number]    Department:  Endocrinology [facility]   Dept Phone:  [phone]    Description:  Female : 1945   Provider:  Yoshi Leblanc PA-C           Reason for Visit     Follow-Up           Allergies as of 5/15/2017     Allergen Noted Reactions    Asa [Aspirin] 2013       Celecoxib 2013       Codeine 2017       Eggs 2017       Pcn [Penicillins] 2013       Tetracycline 2013       Vioxx 2013         You were diagnosed with     Uncontrolled type 2 diabetes mellitus with stage 2 chronic kidney disease, with long-term current use of insulin (CMS-HCC)   [0469336]       Type 2 diabetes mellitus with hyperlipidemia (CMS-HCC)   [2873081]         Vital Signs     Blood Pressure Pulse Height Weight Body Mass Index Oxygen Saturation    114/66 mmHg 118 1.651 m (5' 5\") 62.596 kg (138 lb) 22.96 kg/m2 96%    Smoking Status                   Never Smoker            Basic Information     Date Of Birth Sex Race Ethnicity Preferred Language    1945 Female Black or  Non- English      Your appointments     May 22, 2017 11:20 AM   Established Patient with Yoshi Leblanc PA-C   [facility]    [id number] [place] [address], [address]  [facility] [address] [phone]           You will be receiving a confirmation call a few days before your appointment from our automated call confirmation system.              Problem List              ICD-10-CM Priority Class Noted - Resolved    Fracture of metacarpal bone S62.309A   2013 - Present    Contracture of joint of hand M24.549   2015 - Present    Left hand pain M79.642   6/10/2015 - Present    Chronic low back pain M54.5, G89.29   2016 - Present    DDD (degenerative disc disease), lumbar M51.36   2016 - Present    Spondylosis of lumbosacral region without myelopathy or radiculopathy M47.817 "   9/29/2016 - Present    Chronic neck pain M54.2, G89.29   9/29/2016 - Present    Osteoarthritis of spine with radiculopathy, cervical region M47.22   9/29/2016 - Present    DDD (degenerative disc disease), cervical M50.30   9/29/2016 - Present    Muscle weakness of left upper extremity M62.81   9/29/2016 - Present    Cervical stenosis of spinal canal M48.02   9/29/2016 - Present    Diabetes mellitus type II, uncontrolled (CMS-HCC) E11.65   4/25/2017 - Present    Type 2 diabetes mellitus with hyperlipidemia (CMS-HCC) E11.69, E78.5   4/25/2017 - Present      Health Maintenance        Date Due Completion Dates    DIABETES MONOFILAMENT / LE EXAM 1945 ---    FASTING LIPID PROFILE 4/16/1964 ---    URINE ACR / MICROALBUMIN 4/16/1964 ---    IMM DTaP/Tdap/Td Vaccine (1 - Tdap) 4/16/1965 ---    PAP SMEAR 4/16/1967 ---    MAMMOGRAM 4/16/1986 ---    COLONOSCOPY 4/16/1996 ---    IMM ZOSTER VACCINE 4/16/2006 ---    BONE DENSITY 4/16/2011 ---    IMM PNEUMOCOCCAL 65+ (ADULT) LOW/MEDIUM RISK SERIES (1 of 2 - PCV13) 4/16/2011 ---    A1C SCREENING 8/2/2017 2/2/2017 (Done)    Override on 2/2/2017: Done (12.9)    SERUM CREATININE 1/27/2018 1/27/2017    RETINAL SCREENING 3/16/2018 3/16/2017            Current Immunizations     No immunizations on file.      Below and/or attached are the medications your provider expects you to take. Review all of your home medications and newly ordered medications with your provider and/or pharmacist. Follow medication instructions as directed by your provider and/or pharmacist. Please keep your medication list with you and share with your provider. Update the information when medications are discontinued, doses are changed, or new medications (including over-the-counter products) are added; and carry medication information at all times in the event of emergency situations     Allergies:  ASA - (reactions not documented)     CELECOXIB - (reactions not documented)     CODEINE - (reactions not  documented)     EGGS - (reactions not documented)     PCN - (reactions not documented)     TETRACYCLINE - (reactions not documented)     VIOXX - (reactions not documented)               Medications  Valid as of: May 15, 2017 -  9:35 AM    Generic Name Brand Name Tablet Size Instructions for use    Albuterol Sulfate (Aero Soln) albuterol 108 (90 BASE) MCG/ACT Inhale 2 Puffs by mouth every 6 hours as needed for Shortness of Breath.        Dapagliflozin-Metformin HCl (TABLET SR 24 HR) Dapagliflozin-Metformin HCl ER  MG Take 1 Tab by mouth every day.        Fluticasone-Salmeterol (AEROSOL POWDER, BREATH ACTIVATED) ADVAIR 250-50 MCG/DOSE Inhale 1 Puff by mouth every 12 hours.        Glucose Blood (Strip) glucose blood  1 Strip by Other route as needed.        Insulin Pen Needle (Misc) Insulin Pen Needle 32G X 4 MM 1 Applicator by Does not apply route every day. Using one needle tip with victoza per day        Liraglutide (Solution Pen-injector) VICTOZA 18 MG/3ML Inject 0.3 mL as instructed every day.        Losartan Potassium-HCTZ (Tab) HYZAAR 50-12.5 MG Take 1 Tab by mouth every day.        Lovastatin (Tab) MEVACOR 40 MG Take 40 mg by mouth every evening.        Pantoprazole Sodium (Tablet Delayed Response) PROTONIX 40 MG Take 40 mg by mouth every day.        .                 Medicines prescribed today were sent to:     [facility] [address] [place] [address] (N), [place] - [address] ([place]) [place] [address]    Phone: [phone] Fax: [phone]    Open 24 Hours?: No      Medication refill instructions:       If your prescription bottle indicates you have medication refills left, it is not necessary to call your provider’s office. Please contact your pharmacy and they will refill your medication.    If your prescription bottle indicates you do not have any refills left, you may request refills at any time through one of the following ways: The online [employer] system (except Urgent Care),  by calling your provider’s office, or by asking your pharmacy to contact your provider’s office with a refill request. Medication refills are processed only during regular business hours and may not be available until the next business day. Your provider may request additional information or to have a follow-up visit with you prior to refilling your medication.   *Please Note: Medication refills are assigned a new Rx number when refilled electronically. Your pharmacy may indicate that no refills were authorized even though a new prescription for the same medication is available at the pharmacy. Please request the medicine by name with the pharmacy before contacting your provider for a refill.        Instructions    Start:  Xigduo 10/500 one in th AM  Victoza 0.6 at night (INCREASE to 1.2)    Blood glucose log: Check BG in the morning when wake up, before lunch or dinner and before bed.  So three times a day.  Always bring BG diary to the next office visit.             M.Setek Access Code: [email or web address]  Expires: 5/25/2017 10:23 AM    M.Setek  A secure, online tool to manage your health information     [employer]’s M.Setek® is a secure, online tool that connects you to your personalized health information from the privacy of your home -- day or night - making it very easy for you to manage your healthcare. Once the activation process is completed, you can even access your medical information using the M.Setek parmjit, which is available for free in the Apple Parmjit store or Google Play store.     M.Setek provides the following levels of access (as shown below):   My Chart Features   [facility] Doctor [facility]  Specialists [facility]  Urgent  Care Non-[facility]  Primary Care  Doctor   Email your healthcare team securely and privately 24/7 X X X    Manage appointments: schedule your next appointment; view details of past/upcoming appointments X      Request prescription refills. X      View recent personal medical  records, including lab and immunizations X X X X   View health record, including health history, allergies, medications X X X X   Read reports about your outpatient visits, procedures, consult and ER notes X X X X   See your discharge summary, which is a recap of your hospital and/or ER visit that includes your diagnosis, lab results, and care plan. X X       How to register for Be my eyes:  1. Go to  [email or web address].  2. Click on the Sign Up Now box, which takes you to the New Member Sign Up page. You will need to provide the following information:  a. Enter your Be my eyes Access Code exactly as it appears at the top of this page. (You will not need to use this code after you’ve completed the sign-up process. If you do not sign up before the expiration date, you must request a new code.)   b. Enter your date of birth.   c. Enter your home email address.   d. Click Submit, and follow the next screen’s instructions.  3. Create a Be my eyes ID. This will be your Be my eyes login ID and cannot be changed, so think of one that is secure and easy to remember.  4. Create a Be my eyes password. You can change your password at any time.  5. Enter your Password Reset Question and Answer. This can be used at a later time if you forget your password.   6. Enter your e-mail address. This allows you to receive e-mail notifications when new information is available in Be my eyes.  7. Click Sign Up. You can now view your health information.    For assistance activating your Be my eyes account, call [phone]

## 2017-05-15 NOTE — PATIENT INSTRUCTIONS
Start:  Xigduo 10/500 one in th AM  Victoza 0.6 at night (INCREASE to 1.2)    Blood glucose log: Check BG in the morning when wake up, before lunch or dinner and before bed.  So three times a day.  Always bring BG diary to the next office visit.

## 2017-05-15 NOTE — PROGRESS NOTES
Return to office Patient Consult Note  Referred by: Parveen Barahona M.D.    Reason for consult: Diabetes Management Type 2    HPI:  Margy Boyer is a 72 y.o. old patient who is seeing us today for diabetes care.  This is a pleasant patient with diabetes and I appreciate the opportunity to participate in the care of this patient.    BG Diary:5/15/2017  In the AM: forgot to bring    BG Diary:5/3/2017  In the AM: 140, 120, 106, 98, 104,    Before meal: 133, 95, 188, 134    Before Bed: 127, 113, 134, 105, 160     2/12/17 HbA1c 12.9,  GFR 59    BG Diary:4/25/2017  In the AM: 135, 116, 123, 115, 142, 132, 124  Just before meal: 137, 216, 179, 59, 120, 138,    Just before Bed: 150, 280, 207, 218, 193, 174      Weight:She was 139 on 4/25/17 and today is 134      1. Uncontrolled type 2 diabetes mellitus with stage 2 chronic kidney disease, with long-term current use of insulin (CMS-HCC)  She was on  Kombiglyze XR 5/1000  Glipizide 5  I had her    STOP:  Kombiglyza  Glipizide    Start:  Xigduo 10/500 one in the AM  Victoza 0.6 at night (no change in dose last visit because of nausea)    having diarrhea and does not feel well.      2. Type 2 diabetes mellitus with hyperlipidemia (CMS-HCC)  Feels much better now that we cut her BP meds.      The total time spent seeing this patient today face to face in consultation, and formulating an action plan for this visit was greater than 25 minutes. > Than 50% of this time was spent counseling, discussing problems documented above and below, coordinating care and answering questions by the physician assistant.  We developed a diabetes care plan for this patient today.        ROS:   Constitutional: No night sweats.  Eyes:  No visual changes.  Cardiac: No chest pain, No palpitations or racing heart rate.  Resp: No shortness of breath, No cough,   Gi: No Diarrhea    All other systems were reviewed and were/are negative.  The ROS was revised/revisited during this office visit from the  patients first office visit with me on 4/25/17. Please review the full ROS during the first office visit.    Past Medical History:  Patient Active Problem List    Diagnosis Date Noted   • Diabetes mellitus type II, uncontrolled (CMS-HCC) 04/25/2017   • Type 2 diabetes mellitus with hyperlipidemia (CMS-HCC) 04/25/2017   • Chronic low back pain 09/29/2016   • DDD (degenerative disc disease), lumbar 09/29/2016   • Spondylosis of lumbosacral region without myelopathy or radiculopathy 09/29/2016   • Chronic neck pain 09/29/2016   • Osteoarthritis of spine with radiculopathy, cervical region 09/29/2016   • DDD (degenerative disc disease), cervical 09/29/2016   • Muscle weakness of left upper extremity 09/29/2016   • Cervical stenosis of spinal canal 09/29/2016   • Left hand pain 06/10/2015   • Contracture of joint of hand 01/05/2015   • Fracture of metacarpal bone 12/23/2013       Past Surgical History:  Past Surgical History   Procedure Laterality Date   • Gyn surgery       tubal ligation   • Other orthopedic surgery  ?2012     Right knee       Allergies:  Asa; Celecoxib; Codeine; Eggs; Pcn; Tetracycline; and Vioxx    Social History:  Social History     Social History   • Marital Status: Single     Spouse Name: N/A   • Number of Children: N/A   • Years of Education: N/A     Occupational History   • Not on file.     Social History Main Topics   • Smoking status: Never Smoker    • Smokeless tobacco: Never Used   • Alcohol Use: No   • Drug Use: No   • Sexual Activity: Not on file     Other Topics Concern   • Not on file     Social History Narrative       Family History:  No family history on file.    Medications:    Current outpatient prescriptions:   •  VICTOZA 18 MG/3ML Solution Pen-injector injection, Inject 0.3 mL as instructed every day., Disp: 3 PEN, Rfl: 6  •  Dapagliflozin-Metformin HCl ER (XIGDUO XR)  MG TABLET SR 24 HR, Take 1 Tab by mouth every day., Disp: 30 Tab, Rfl: 11  •  Insulin Pen Needle (NOVOFINE  "PLUS) 32G X 4 MM Misc, 1 Applicator by Does not apply route every day. Using one needle tip with victoza per day, Disp: 100 Each, Rfl: 3  •  losartan-hydrochlorothiazide (HYZAAR) 50-12.5 MG per tablet, Take 1 Tab by mouth every day., Disp: 30 Tab, Rfl: 11  •  glucose blood (FREESTYLE LITE) strip, 1 Strip by Other route as needed., Disp: 100 Strip, Rfl: 6  •  lovastatin (MEVACOR) 40 MG tablet, Take 40 mg by mouth every evening., Disp: , Rfl:   •  pantoprazole (PROTONIX) 40 MG Tablet Delayed Response, Take 40 mg by mouth every day., Disp: , Rfl:   •  fluticasone-salmeterol (ADVAIR) 250-50 MCG/DOSE AEROSOL POWDER, BREATH ACTIVATED, Inhale 1 Puff by mouth every 12 hours., Disp: , Rfl:   •  albuterol (VENTOLIN OR PROVENTIL) 108 (90 BASE) MCG/ACT Aero Soln inhalation aerosol, Inhale 2 Puffs by mouth every 6 hours as needed for Shortness of Breath., Disp: 8.5 g, Rfl: 3        Physical Examination:   Vital signs: /66 mmHg  Pulse 118  Ht 1.651 m (5' 5\")  Wt 62.596 kg (138 lb)  BMI 22.96 kg/m2  SpO2 96%  General: No distress, cooperative, well dressed and well nourished.   Eyes: No scleral icterus or discharge, No hyposphagma  ENMT: Normal on external inspection of nose, lips, No nasal drainage   Neck: No abnormal masses on inspection  Resp: Normal effort, Bilateral clear to auscultation, No wheezing, No rales  CVS: Regular rate and rhythm, S1 S2 normal, No murmur. No gallop  Extremities: No edema bilateral extremities  Neuro: Alert and oriented  Skin: No rash, No Ulcers  Psych: Normal mood and affect      Assessment and Plan:    1. Uncontrolled type 2 diabetes mellitus with stage 2 chronic kidney disease, with long-term current use of insulin (CMS-[facility])  She was on  Kombiglyze XR 5/1000  Glipizide 5  I had her    STOP:  Kombiglyza  Glipizide    Start:  Xigduo 10/500 one in th AM  Victoza 0.6 at night (INCREASE to 1.2)    having diarrhea and does not feel well.      She is using a diabetic supply store      2. Type " 2 diabetes mellitus with hyperlipidemia (CMS-[facility])  Feels her HTN is doing better.   Now on Losartan 50/6.25      Return in about 1 week (around 5/22/2017).    Blood glucose log: Check BG in the morning when wake up, before lunch or dinner and before bed.  So three times a day.  Always bring BG diary to the next office visit.     Thank you kindly for allowing me to participate in the diabetes care plan for this patient.    Martin Leblanc PA-C, BC-ADM  05/15/2017    CC:   Parveen Barahona M.D.

## 2017-05-22 ENCOUNTER — OFFICE VISIT (OUTPATIENT)
Dept: ENDOCRINOLOGY | Facility: MEDICAL CENTER | Age: 72
End: 2017-05-22
Payer: MEDICARE

## 2017-05-22 ENCOUNTER — APPOINTMENT (OUTPATIENT)
Dept: ENDOCRINOLOGY | Facility: MEDICAL CENTER | Age: 72
End: 2017-05-22
Payer: MEDICARE

## 2017-05-22 VITALS
WEIGHT: 135 LBS | HEIGHT: 65 IN | BODY MASS INDEX: 22.49 KG/M2 | DIASTOLIC BLOOD PRESSURE: 72 MMHG | OXYGEN SATURATION: 98 % | SYSTOLIC BLOOD PRESSURE: 144 MMHG | HEART RATE: 96 BPM

## 2017-05-22 DIAGNOSIS — E78.5 TYPE 2 DIABETES MELLITUS WITH HYPERLIPIDEMIA (HCC): ICD-10-CM

## 2017-05-22 DIAGNOSIS — E11.00 UNCONTROLLED TYPE 2 DIABETES MELLITUS WITH HYPEROSMOLARITY WITHOUT COMA, UNSPECIFIED LONG TERM INSULIN USE STATUS: ICD-10-CM

## 2017-05-22 DIAGNOSIS — E11.69 TYPE 2 DIABETES MELLITUS WITH HYPERLIPIDEMIA (HCC): ICD-10-CM

## 2017-05-22 PROCEDURE — 4040F PNEUMOC VAC/ADMIN/RCVD: CPT | Mod: 8P | Performed by: PHYSICIAN ASSISTANT

## 2017-05-22 PROCEDURE — 1036F TOBACCO NON-USER: CPT | Performed by: PHYSICIAN ASSISTANT

## 2017-05-22 PROCEDURE — 99214 OFFICE O/P EST MOD 30 MIN: CPT | Mod: 25 | Performed by: PHYSICIAN ASSISTANT

## 2017-05-22 PROCEDURE — 3014F SCREEN MAMMO DOC REV: CPT | Mod: 8P | Performed by: PHYSICIAN ASSISTANT

## 2017-05-22 PROCEDURE — 3017F COLORECTAL CA SCREEN DOC REV: CPT | Performed by: PHYSICIAN ASSISTANT

## 2017-05-22 PROCEDURE — G8420 CALC BMI NORM PARAMETERS: HCPCS | Performed by: PHYSICIAN ASSISTANT

## 2017-05-22 PROCEDURE — 3046F HEMOGLOBIN A1C LEVEL >9.0%: CPT | Mod: 8P | Performed by: PHYSICIAN ASSISTANT

## 2017-05-22 PROCEDURE — 1101F PT FALLS ASSESS-DOCD LE1/YR: CPT | Mod: 8P | Performed by: PHYSICIAN ASSISTANT

## 2017-05-22 NOTE — PROGRESS NOTES
Return to office Patient Consult Note  Referred by: Parveen Barahona M.D.    Reason for consult: Diabetes Management Type 2    HPI:  Margy Boyer is a 72 y.o. old patient who is seeing us today for diabetes care.  This is a pleasant patient with diabetes and I appreciate the opportunity to participate in the care of this patient.    BG Diary:5/22/2017  In the AM:130, 134, 115, 104, 136, 133, 125  Before meal: 160, 112, 226, 153, 183, 172  Before Bed: 153, 92, 218, 127, 142, 198    BG Diary:5/15/2017  In the AM: forgot to bring    BG Diary:5/3/2017  In the AM: 140, 120, 106, 98, 104,    Before meal: 133, 95, 188, 134    Before Bed: 127, 113, 134, 105, 160     2/12/17 HbA1c 12.9,  GFR 59    BG Diary:4/25/2017  In the AM: 135, 116, 123, 115, 142, 132, 124  Just before meal: 137, 216, 179, 59, 120, 138,    Just before Bed: 150, 280, 207, 218, 193, 174      Weight:She was 139 on 4/25/17 and today is 134      1. Type 2 diabetes mellitus with hyperlipidemia (CMS-HCC)  She was on  Kombiglyze XR 5/1000  Glipizide 5  I had her    STOP:  Kombiglyza  Glipizide    She is now on:  Xigduo 10/500 one in the AM  Victoza 1.2 at night     Diarrhea resolve    2. Uncontrolled type 2 diabetes mellitus with hyperosmolarity without coma, unspecified long term insulin use status (CMS-HCC)  She is doing well        ROS:   Constitutional: No night sweats.  Eyes:  No visual changes.  Cardiac: No chest pain, No palpitations or racing heart rate.  Resp: No shortness of breath, No cough,   Gi: No Diarrhea    All other systems were reviewed and were/are negative.  The ROS was revised/revisited during this office visit from the patients first office visit with me on 4/25/17 Please review the full ROS during the first office visit.    Past Medical History:  Patient Active Problem List    Diagnosis Date Noted   • Diabetes mellitus type II, uncontrolled (CMS-HCC) 04/25/2017   • Type 2 diabetes mellitus with hyperlipidemia (CMS-HCC) 04/25/2017   •  Chronic low back pain 09/29/2016   • DDD (degenerative disc disease), lumbar 09/29/2016   • Spondylosis of lumbosacral region without myelopathy or radiculopathy 09/29/2016   • Chronic neck pain 09/29/2016   • Osteoarthritis of spine with radiculopathy, cervical region 09/29/2016   • DDD (degenerative disc disease), cervical 09/29/2016   • Muscle weakness of left upper extremity 09/29/2016   • Cervical stenosis of spinal canal 09/29/2016   • Left hand pain 06/10/2015   • Contracture of joint of hand 01/05/2015   • Fracture of metacarpal bone 12/23/2013       Past Surgical History:  Past Surgical History   Procedure Laterality Date   • Gyn surgery       tubal ligation   • Other orthopedic surgery  ?2012     Right knee       Allergies:  Asa; Celecoxib; Codeine; Eggs; Pcn; Tetracycline; and Vioxx    Social History:  Social History     Social History   • Marital Status: Single     Spouse Name: N/A   • Number of Children: N/A   • Years of Education: N/A     Occupational History   • Not on file.     Social History Main Topics   • Smoking status: Never Smoker    • Smokeless tobacco: Never Used   • Alcohol Use: No   • Drug Use: No   • Sexual Activity: Not on file     Other Topics Concern   • Not on file     Social History Narrative       Family History:  History reviewed. No pertinent family history.    Medications:    Current outpatient prescriptions:   •  glucose blood (FREESTYLE LITE) strip, 1 Strip by Other route 3 times a day., Disp: 300 Strip, Rfl: 3  •  VICTOZA 18 MG/3ML Solution Pen-injector injection, Inject 0.3 mL as instructed every day., Disp: 3 PEN, Rfl: 6  •  Dapagliflozin-Metformin HCl ER (XIGDUO XR)  MG TABLET SR 24 HR, Take 1 Tab by mouth every day., Disp: 30 Tab, Rfl: 11  •  Insulin Pen Needle (NOVOFINE PLUS) 32G X 4 MM Misc, 1 Applicator by Does not apply route every day. Using one needle tip with victoza per day, Disp: 100 Each, Rfl: 3  •  losartan-hydrochlorothiazide (HYZAAR) 50-12.5 MG per  "tablet, Take 1 Tab by mouth every day., Disp: 30 Tab, Rfl: 11  •  lovastatin (MEVACOR) 40 MG tablet, Take 40 mg by mouth every evening., Disp: , Rfl:   •  pantoprazole (PROTONIX) 40 MG Tablet Delayed Response, Take 40 mg by mouth every day., Disp: , Rfl:   •  fluticasone-salmeterol (ADVAIR) 250-50 MCG/DOSE AEROSOL POWDER, BREATH ACTIVATED, Inhale 1 Puff by mouth every 12 hours., Disp: , Rfl:   •  albuterol (VENTOLIN OR PROVENTIL) 108 (90 BASE) MCG/ACT Aero Soln inhalation aerosol, Inhale 2 Puffs by mouth every 6 hours as needed for Shortness of Breath., Disp: 8.5 g, Rfl: 3        Physical Examination:   Vital signs: /72 mmHg  Pulse 96  Ht 1.651 m (5' 5\")  Wt 61.236 kg (135 lb)  BMI 22.47 kg/m2  SpO2 98%  General: No distress, cooperative, well dressed and well nourished.   Eyes: No scleral icterus or discharge, No hyposphagma  ENMT: Normal on external inspection of nose, lips, No nasal drainage   Neck: No abnormal masses on inspection  Resp: Normal effort, Bilateral clear to auscultation, No wheezing, No rales  CVS: Regular rate and rhythm, S1 S2 normal, No murmur. No gallop  Extremities: No edema bilateral extremities  Neuro: Alert and oriented  Skin: No rash, No Ulcers  Psych: Normal mood and affect      Assessment and Plan:    1. Type 2 diabetes mellitus with hyperlipidemia (CMS-HCC)  She was on  Kombiglyze XR 5/1000  Glipizide 5  I had her    STOP:  Kombiglyza  Glipizide    She is now on:  Xigduo 10/500 one in the AM  Victoza 1.2 at night  (INCREASE to 1.8)    Diarrhea resolve    2. Uncontrolled type 2 diabetes mellitus with hyperosmolarity without coma, unspecified long term insulin use status (CMS-HCC)  The total time spent seeing this patient today face to face in consultation, and formulating an action plan for this visit was greater than 25 minutes. > Than 50% of this time was spent counseling, discussing problems documented above and below, coordinating care and answering questions by the " physician assistant.  We developed a diabetes care plan for this patient today.      Return in about 1 week (around 5/29/2017).    Blood glucose log: Check BG in the morning when wake up, before lunch or dinner and before bed.  So three times a day.  Always bring BG diary to the next office visit.       Thank you kindly for allowing me to participate in the diabetes care plan for this patient.    Martin Leblanc PA-C, BC-ADM  05/22/2017    CC:   Parveen Barahona M.D.

## 2017-05-22 NOTE — PATIENT INSTRUCTIONS
She is now on:  Xigduo 10/500 one in the AM  Victoza 1.2 at night  (INCREASE to 1.8)    Blood glucose log: Check BG in the morning when wake up, before lunch or dinner and before bed.  So three times a day.  Always bring BG diary to the next office visit.

## 2017-05-22 NOTE — MR AVS SNAPSHOT
"        Margy Boyer   2017 11:20 AM   Office Visit   MRN: [id number]    Department:  Endocrinology [facility]   Dept Phone:  [phone]    Description:  Female : 1945   Provider:  Yoshi Leblanc PA-C           Reason for Visit     Follow-Up           Allergies as of 2017     Allergen Noted Reactions    Asa [Aspirin] 2013       Celecoxib 2013       Codeine 2017       Eggs 2017       Pcn [Penicillins] 2013       Tetracycline 2013       Vioxx 2013         You were diagnosed with     Type 2 diabetes mellitus with hyperlipidemia (CMS-HCC)   [4791262]       Uncontrolled type 2 diabetes mellitus with hyperosmolarity without coma, unspecified long term insulin use status (CMS-HCC)   [0646941]         Vital Signs     Blood Pressure Pulse Height Weight Body Mass Index Oxygen Saturation    144/72 mmHg 96 1.651 m (5' 5\") 61.236 kg (135 lb) 22.47 kg/m2 98%    Smoking Status                   Never Smoker            Basic Information     Date Of Birth Sex Race Ethnicity Preferred Language    1945 Female Black or  Non- English      Your appointments     May 30, 2017  1:10 PM   Established Patient with Yoshi Leblanc PA-C   [facility]    [address] [place] [address], [address]  [facility] [address] [phone]           You will be receiving a confirmation call a few days before your appointment from our automated call confirmation system.              Problem List              ICD-10-CM Priority Class Noted - Resolved    Fracture of metacarpal bone S62.309A   2013 - Present    Contracture of joint of hand M24.549   2015 - Present    Left hand pain M79.642   6/10/2015 - Present    Chronic low back pain M54.5, G89.29   2016 - Present    DDD (degenerative disc disease), lumbar M51.36   2016 - Present    Spondylosis of lumbosacral region without myelopathy or radiculopathy " M47.817   9/29/2016 - Present    Chronic neck pain M54.2, G89.29   9/29/2016 - Present    Osteoarthritis of spine with radiculopathy, cervical region M47.22   9/29/2016 - Present    DDD (degenerative disc disease), cervical M50.30   9/29/2016 - Present    Muscle weakness of left upper extremity M62.81   9/29/2016 - Present    Cervical stenosis of spinal canal M48.02   9/29/2016 - Present    Diabetes mellitus type II, uncontrolled (CMS-HCC) E11.65   4/25/2017 - Present    Type 2 diabetes mellitus with hyperlipidemia (CMS-HCC) E11.69, E78.5   4/25/2017 - Present      Health Maintenance        Date Due Completion Dates    DIABETES MONOFILAMENT / LE EXAM 1945 ---    FASTING LIPID PROFILE 4/16/1964 ---    URINE ACR / MICROALBUMIN 4/16/1964 ---    IMM DTaP/Tdap/Td Vaccine (1 - Tdap) 4/16/1965 ---    PAP SMEAR 4/16/1967 ---    MAMMOGRAM 4/16/1986 ---    COLONOSCOPY 4/16/1996 ---    IMM ZOSTER VACCINE 4/16/2006 ---    BONE DENSITY 4/16/2011 ---    IMM PNEUMOCOCCAL 65+ (ADULT) LOW/MEDIUM RISK SERIES (1 of 2 - PCV13) 4/16/2011 ---    A1C SCREENING 8/2/2017 2/2/2017 (Done)    Override on 2/2/2017: Done (12.9)    SERUM CREATININE 1/27/2018 1/27/2017    RETINAL SCREENING 3/16/2018 3/16/2017            Current Immunizations     No immunizations on file.      Below and/or attached are the medications your provider expects you to take. Review all of your home medications and newly ordered medications with your provider and/or pharmacist. Follow medication instructions as directed by your provider and/or pharmacist. Please keep your medication list with you and share with your provider. Update the information when medications are discontinued, doses are changed, or new medications (including over-the-counter products) are added; and carry medication information at all times in the event of emergency situations     Allergies:  ASA - (reactions not documented)     CELECOXIB - (reactions not documented)     CODEINE - (reactions  not documented)     EGGS - (reactions not documented)     PCN - (reactions not documented)     TETRACYCLINE - (reactions not documented)     VIOXX - (reactions not documented)               Medications  Valid as of: May 22, 2017 - 11:34 AM    Generic Name Brand Name Tablet Size Instructions for use    Albuterol Sulfate (Aero Soln) albuterol 108 (90 BASE) MCG/ACT Inhale 2 Puffs by mouth every 6 hours as needed for Shortness of Breath.        Dapagliflozin-Metformin HCl (TABLET SR 24 HR) Dapagliflozin-Metformin HCl ER  MG Take 1 Tab by mouth every day.        Fluticasone-Salmeterol (AEROSOL POWDER, BREATH ACTIVATED) ADVAIR 250-50 MCG/DOSE Inhale 1 Puff by mouth every 12 hours.        Glucose Blood (Strip) glucose blood  1 Strip by Other route 3 times a day.        Insulin Pen Needle (Misc) Insulin Pen Needle 32G X 4 MM 1 Applicator by Does not apply route every day. Using one needle tip with victoza per day        Liraglutide (Solution Pen-injector) VICTOZA 18 MG/3ML Inject 0.3 mL as instructed every day.        Losartan Potassium-HCTZ (Tab) HYZAAR 50-12.5 MG Take 1 Tab by mouth every day.        Lovastatin (Tab) MEVACOR 40 MG Take 40 mg by mouth every evening.        Pantoprazole Sodium (Tablet Delayed Response) PROTONIX 40 MG Take 40 mg by mouth every day.        .                 Medicines prescribed today were sent to:     [facility] [address] [place] [address] (N), [place] - [address] [facility]    [address] [place] [address] (N) [place] [address]    Phone: [phone] Fax: [phone]    Open 24 Hours?: No      Medication refill instructions:       If your prescription bottle indicates you have medication refills left, it is not necessary to call your provider’s office. Please contact your pharmacy and they will refill your medication.    If your prescription bottle indicates you do not have any refills left, you may request refills at any time through one of the following ways: The online e[employer] system (except Urgent  Care), by calling your provider’s office, or by asking your pharmacy to contact your provider’s office with a refill request. Medication refills are processed only during regular business hours and may not be available until the next business day. Your provider may request additional information or to have a follow-up visit with you prior to refilling your medication.   *Please Note: Medication refills are assigned a new Rx number when refilled electronically. Your pharmacy may indicate that no refills were authorized even though a new prescription for the same medication is available at the pharmacy. Please request the medicine by name with the pharmacy before contacting your provider for a refill.        Instructions    She is now on:  Xigduo 10/500 one in the AM  Victoza 1.2 at night  (INCREASE to 1.8)    Blood glucose log: Check BG in the morning when wake up, before lunch or dinner and before bed.  So three times a day.  Always bring BG diary to the next office visit.               [employer] Access Code: [email or web address]  Expires: 5/25/2017 10:23 AM    [employer]  A secure, online tool to manage your health information     [employer]’s [employer]® is a secure, online tool that connects you to your personalized health information from the privacy of your home -- day or night - making it very easy for you to manage your healthcare. Once the activation process is completed, you can even access your medical information using the [employer] parmjit, which is available for free in the Apple Parmjit store or Google Play store.     [employer] provides the following levels of access (as shown below):   My Chart Features   [facility] Primary Care Doctor [facility]  Specialists [facility]  Urgent  Care Non-[facility]  Primary Care  Doctor   Email your healthcare team securely and privately 24/7 X X X    Manage appointments: schedule your next appointment; view details of past/upcoming appointments X      Request prescription refills. X      View recent  personal medical records, including lab and immunizations X X X X   View health record, including health history, allergies, medications X X X X   Read reports about your outpatient visits, procedures, consult and ER notes X X X X   See your discharge summary, which is a recap of your hospital and/or ER visit that includes your diagnosis, lab results, and care plan. X X       How to register for Phot[employer]et:  1. Go to  [email or web address].  2. Click on the Sign Up Now box, which takes you to the New Member Sign Up page. You will need to provide the following information:  a. Enter your [employer] Access Code exactly as it appears at the top of this page. (You will not need to use this code after you’ve completed the sign-up process. If you do not sign up before the expiration date, you must request a new code.)   b. Enter your date of birth.   c. Enter your home email address.   d. Click Submit, and follow the next screen’s instructions.  3. Create a Photobucket ID. This will be your Photobucket login ID and cannot be changed, so think of one that is secure and easy to remember.  4. Create a Photob[employer]et password. You can change your password at any time.  5. Enter your Password Reset Question and Answer. This can be used at a later time if you forget your password.   6. Enter your e-mail address. This allows you to receive e-mail notifications when new information is available in Photobucket.  7. Click Sign Up. You can now view your health information.    For assistance activating your Photobucket account, call [phone]

## 2017-05-30 ENCOUNTER — OFFICE VISIT (OUTPATIENT)
Dept: ENDOCRINOLOGY | Facility: MEDICAL CENTER | Age: 72
End: 2017-05-30
Payer: MEDICARE

## 2017-05-30 VITALS
WEIGHT: 130 LBS | DIASTOLIC BLOOD PRESSURE: 58 MMHG | BODY MASS INDEX: 21.66 KG/M2 | OXYGEN SATURATION: 96 % | SYSTOLIC BLOOD PRESSURE: 104 MMHG | HEIGHT: 65 IN | HEART RATE: 108 BPM

## 2017-05-30 DIAGNOSIS — E11.00 UNCONTROLLED TYPE 2 DIABETES MELLITUS WITH HYPEROSMOLARITY WITHOUT COMA, UNSPECIFIED LONG TERM INSULIN USE STATUS: ICD-10-CM

## 2017-05-30 DIAGNOSIS — E11.69 TYPE 2 DIABETES MELLITUS WITH HYPERLIPIDEMIA (HCC): ICD-10-CM

## 2017-05-30 DIAGNOSIS — E78.5 TYPE 2 DIABETES MELLITUS WITH HYPERLIPIDEMIA (HCC): ICD-10-CM

## 2017-05-30 PROCEDURE — 4040F PNEUMOC VAC/ADMIN/RCVD: CPT | Mod: 8P | Performed by: PHYSICIAN ASSISTANT

## 2017-05-30 PROCEDURE — 3014F SCREEN MAMMO DOC REV: CPT | Mod: 8P | Performed by: PHYSICIAN ASSISTANT

## 2017-05-30 PROCEDURE — 1101F PT FALLS ASSESS-DOCD LE1/YR: CPT | Mod: 8P | Performed by: PHYSICIAN ASSISTANT

## 2017-05-30 PROCEDURE — 3017F COLORECTAL CA SCREEN DOC REV: CPT | Performed by: PHYSICIAN ASSISTANT

## 2017-05-30 PROCEDURE — G8420 CALC BMI NORM PARAMETERS: HCPCS | Performed by: PHYSICIAN ASSISTANT

## 2017-05-30 PROCEDURE — 1036F TOBACCO NON-USER: CPT | Performed by: PHYSICIAN ASSISTANT

## 2017-05-30 PROCEDURE — 3046F HEMOGLOBIN A1C LEVEL >9.0%: CPT | Mod: 8P | Performed by: PHYSICIAN ASSISTANT

## 2017-05-30 PROCEDURE — 99213 OFFICE O/P EST LOW 20 MIN: CPT | Mod: 25 | Performed by: PHYSICIAN ASSISTANT

## 2017-05-30 RX ORDER — LIRAGLUTIDE 6 MG/ML
1.8 INJECTION SUBCUTANEOUS DAILY
Qty: 3 PEN | Refills: 6 | Status: SHIPPED | OUTPATIENT
Start: 2017-05-30 | End: 2017-08-25

## 2017-05-30 NOTE — MR AVS SNAPSHOT
"        Margy Boyer   2017 1:10 PM   Office Visit   MRN: [id number]    Department:  Endocrinology Med [facility]   Dept Phone:  [phone]    Description:  Female : 1945   Provider:  Yoshi Leblanc PA-C           Reason for Visit     Follow-Up           Allergies as of 2017     Allergen Noted Reactions    Asa [Aspirin] 2013       Celecoxib 2013       Codeine 2017       Eggs 2017       Pcn [Penicillins] 2013       Tetracycline 2013       Vioxx 2013         You were diagnosed with     Uncontrolled type 2 diabetes mellitus with hyperosmolarity without coma, unspecified long term insulin use status (CMS-HCC)   [2318552]       Type 2 diabetes mellitus with hyperlipidemia (CMS-HCC)   [3765554]         Vital Signs     Blood Pressure Pulse Height Weight Body Mass Index Oxygen Saturation    104/58 mmHg 108 1.651 m (5' 5\") 58.968 kg (130 lb) 21.63 kg/m2 96%    Smoking Status                   Never Smoker            Basic Information     Date Of Birth Sex Race Ethnicity Preferred Language    1945 Female Black or  Non- English      Your appointments     2017  4:00 PM   Established Patient with Yoshi Leblanc PA-C   [facility]    [address] [place] [address], [address]  [facility] [address] [phone]           You will be receiving a confirmation call a few days before your appointment from our automated call confirmation system.              Problem List              ICD-10-CM Priority Class Noted - Resolved    Fracture of metacarpal bone S62.309A   2013 - Present    Contracture of joint of hand M24.549   2015 - Present    Left hand pain M79.642   6/10/2015 - Present    Chronic low back pain M54.5, G89.29   2016 - Present    DDD (degenerative disc disease), lumbar M51.36   2016 - Present    Spondylosis of lumbosacral region without myelopathy or radiculopathy " M47.817   9/29/2016 - Present    Chronic neck pain M54.2, G89.29   9/29/2016 - Present    Osteoarthritis of spine with radiculopathy, cervical region M47.22   9/29/2016 - Present    DDD (degenerative disc disease), cervical M50.30   9/29/2016 - Present    Muscle weakness of left upper extremity M62.81   9/29/2016 - Present    Cervical stenosis of spinal canal M48.02   9/29/2016 - Present    Diabetes mellitus type II, uncontrolled (CMS-HCC) E11.65   4/25/2017 - Present    Type 2 diabetes mellitus with hyperlipidemia (CMS-HCC) E11.69, E78.5   4/25/2017 - Present      Health Maintenance        Date Due Completion Dates    DIABETES MONOFILAMENT / LE EXAM 1945 ---    FASTING LIPID PROFILE 4/16/1964 ---    URINE ACR / MICROALBUMIN 4/16/1964 ---    IMM DTaP/Tdap/Td Vaccine (1 - Tdap) 4/16/1965 ---    PAP SMEAR 4/16/1967 ---    MAMMOGRAM 4/16/1986 ---    COLONOSCOPY 4/16/1996 ---    IMM ZOSTER VACCINE 4/16/2006 ---    BONE DENSITY 4/16/2011 ---    IMM PNEUMOCOCCAL 65+ (ADULT) LOW/MEDIUM RISK SERIES (1 of 2 - PCV13) 4/16/2011 ---    A1C SCREENING 8/2/2017 2/2/2017 (Done)    Override on 2/2/2017: Done (12.9)    SERUM CREATININE 1/27/2018 1/27/2017    RETINAL SCREENING 3/16/2018 3/16/2017            Current Immunizations     No immunizations on file.      Below and/or attached are the medications your provider expects you to take. Review all of your home medications and newly ordered medications with your provider and/or pharmacist. Follow medication instructions as directed by your provider and/or pharmacist. Please keep your medication list with you and share with your provider. Update the information when medications are discontinued, doses are changed, or new medications (including over-the-counter products) are added; and carry medication information at all times in the event of emergency situations     Allergies:  ASA - (reactions not documented)     CELECOXIB - (reactions not documented)     CODEINE - (reactions  not documented)     EGGS - (reactions not documented)     PCN - (reactions not documented)     TETRACYCLINE - (reactions not documented)     VIOXX - (reactions not documented)               Medications  Valid as of: May 30, 2017 -  1:26 PM    Generic Name Brand Name Tablet Size Instructions for use    Albuterol Sulfate (Aero Soln) albuterol 108 (90 BASE) MCG/ACT Inhale 2 Puffs by mouth every 6 hours as needed for Shortness of Breath.        Dapagliflozin-Metformin HCl   Take  by mouth.        Empagliflozin-Metformin HCl (Tab) Empagliflozin-Metformin HCl 12.5-500 MG Take 1 tablet by mouth 2 Times a Day.        Fluticasone-Salmeterol (AEROSOL POWDER, BREATH ACTIVATED) ADVAIR 250-50 MCG/DOSE Inhale 1 Puff by mouth every 12 hours.        Glucose Blood (Strip) glucose blood  1 Strip by Other route 3 times a day.        Insulin Pen Needle (Misc) Insulin Pen Needle 32G X 4 MM 1 Applicator by Does not apply route every day. Using one needle tip with victoza per day        Insulin Pen Needle (Misc) Insulin Pen Needle 32G X 4 MM 1 Applicator by Does not apply route every day. Using one needle tip with victoza per day        Liraglutide (Solution Pen-injector) VICTOZA 18 MG/3ML Inject 0.3 mL as instructed every day.        Losartan Potassium-HCTZ (Tab) HYZAAR 50-12.5 MG Take 1 Tab by mouth every day.        Lovastatin (Tab) MEVACOR 40 MG Take 40 mg by mouth every evening.        Pantoprazole Sodium (Tablet Delayed Response) PROTONIX 40 MG Take 40 mg by mouth every day.        .                 Medicines prescribed today were sent to:     [address] (N), [place] [address] [address] (N) [place] [address]    Phone: [phone] Fax: [phone]    Open 24 Hours?: No      Medication refill instructions:       If your prescription bottle indicates you have medication refills left, it is not necessary to call your provider’s office. Please contact your pharmacy and they will refill  your medication.    If your prescription bottle indicates you do not have any refills left, you may request refills at any time through one of the following ways: The online [employer] system (except Urgent Care), by calling your provider’s office, or by asking your pharmacy to contact your provider’s office with a refill request. Medication refills are processed only during regular business hours and may not be available until the next business day. Your provider may request additional information or to have a follow-up visit with you prior to refilling your medication.   *Please Note: Medication refills are assigned a new Rx number when refilled electronically. Your pharmacy may indicate that no refills were authorized even though a new prescription for the same medication is available at the pharmacy. Please request the medicine by name with the pharmacy before contacting your provider for a refill.        Instructions    She is now on:  Xigduo 10/500 one in the AM  Victoza 1.8 at night               [employer] Access Code: [email or web address]  Expires: 6/29/2017  1:26 PM    [employer]  A secure, online tool to manage your health information     [employer]’s [employer]® is a secure, online tool that connects you to your personalized health information from the privacy of your home -- day or night - making it very easy for you to manage your healthcare. Once the activation process is completed, you can even access your medical information using the [employer] parmjit, which is available for free in the Apple Parmjit store or Google Play store.     [employer] provides the following levels of access (as shown below):   My Chart Features   [facility] Primary Care Doctor [facility]  Specialists [facility]  Urgent  Care Non-[facility]  Primary Care  Doctor   Email your healthcare team securely and privately 24/7 X X X    Manage appointments: schedule your next appointment; view details of past/upcoming appointments X      Request prescription refills. X       View recent personal medical records, including lab and immunizations X X X X   View health record, including health history, allergies, medications X X X X   Read reports about your outpatient visits, procedures, consult and ER notes X X X X   See your discharge summary, which is a recap of your hospital and/or ER visit that includes your diagnosis, lab results, and care plan. X X       How to register for [employer]:  1. Go to  [email or web address].  2. Click on the Sign Up Now box, which takes you to the New Member Sign Up page. You will need to provide the following information:  a. Enter your [employer] Access Code exactly as it appears at the top of this page. (You will not need to use this code after you’ve completed the sign-up process. If you do not sign up before the expiration date, you must request a new code.)   b. Enter your date of birth.   c. Enter your home email address.   d. Click Submit, and follow the next screen’s instructions.  3. Create a [employer]on ID. This will be your [employer] login ID and cannot be changed, so think of one that is secure and easy to remember.  4. Create a [employer] password. You can change your password at any time.  5. Enter your Password Reset Question and Answer. This can be used at a later time if you forget your password.   6. Enter your e-mail address. This allows you to receive e-mail notifications when new information is available in [employer]oll[employer]on.  7. Click Sign Up. You can now view your health information.    For assistance activating your [employer] account, call [phone]

## 2017-05-30 NOTE — PROGRESS NOTES
Return to office Patient Consult Note  Referred by: Parveen Barahona M.D.    Reason for consult: Diabetes Management Type 2    HPI:  Margy Boyer is a 72 y.o. old patient who is seeing us today for diabetes care.  This is a pleasant patient with diabetes and I appreciate the opportunity to participate in the care of this patient.    BG Diary:5/30/2017  In the AM: 125, 136, 141, 127, 142, 144, 168, 129,  Before meal:  Before Bed: 200, 133, 200, 118, 355, 150     BG Diary:5/22/2017  In the AM:130, 134, 115, 104, 136, 133, 125  Before meal: 160, 112, 226, 153, 183, 172  Before Bed: 153, 92, 218, 127, 142, 198    BG Diary:5/15/2017  In the AM: forgot to bring    BG Diary:5/3/2017  In the AM: 140, 120, 106, 98, 104,    Before meal: 133, 95, 188, 134    Before Bed: 127, 113, 134, 105, 160     2/12/17 HbA1c 12.9,  GFR 59    BG Diary:4/25/2017  In the AM: 135, 116, 123, 115, 142, 132, 124  Just before meal: 137, 216, 179, 59, 120, 138,    Just before Bed: 150, 280, 207, 218, 193, 174      Weight:She was 139 on 4/25/17 and today is 130      1. Uncontrolled type 2 diabetes mellitus with hyperosmolarity without coma, unspecified long term insulin use status (CMS-[facility])  She was on  Kombiglyze XR 5/1000  Glipizide 5  I had her    STOP:  Kombiglyza  Glipizide    She is now on:  Xigduo 10/500 one in the AM  Victoza 1.8 at night     Diarrhea resolve    She is not perfect but I would like to see how it goes for the next month.    2. Type 2 diabetes mellitus with hyperlipidemia (CMS-[facility])  This is stable        ROS:   Constitutional: No night sweats.  Eyes:  No visual changes.  Cardiac: No chest pain, No palpitations or racing heart rate.  Resp: No shortness of breath, No cough,   Gi: No Diarrhea      All other systems were reviewed and were/are negative.  The ROS was revised/revisited during this office visit from the patients first office visit with me on 4/25/17 Please review the full ROS during the first office visit.    Past  Medical History:  Patient Active Problem List    Diagnosis Date Noted   • Diabetes mellitus type II, uncontrolled (CMS-HCC) 04/25/2017   • Type 2 diabetes mellitus with hyperlipidemia (CMS-HCC) 04/25/2017   • Chronic low back pain 09/29/2016   • DDD (degenerative disc disease), lumbar 09/29/2016   • Spondylosis of lumbosacral region without myelopathy or radiculopathy 09/29/2016   • Chronic neck pain 09/29/2016   • Osteoarthritis of spine with radiculopathy, cervical region 09/29/2016   • DDD (degenerative disc disease), cervical 09/29/2016   • Muscle weakness of left upper extremity 09/29/2016   • Cervical stenosis of spinal canal 09/29/2016   • Left hand pain 06/10/2015   • Contracture of joint of hand 01/05/2015   • Fracture of metacarpal bone 12/23/2013       Past Surgical History:  Past Surgical History   Procedure Laterality Date   • Gyn surgery       tubal ligation   • Other orthopedic surgery  ?2012     Right knee       Allergies:  Asa; Celecoxib; Codeine; Eggs; Pcn; Tetracycline; and Vioxx    Social History:  Social History     Social History   • Marital Status: Single     Spouse Name: N/A   • Number of Children: N/A   • Years of Education: N/A     Occupational History   • Not on file.     Social History Main Topics   • Smoking status: Never Smoker    • Smokeless tobacco: Never Used   • Alcohol Use: No   • Drug Use: No   • Sexual Activity: Not on file     Other Topics Concern   • Not on file     Social History Narrative       Family History:  History reviewed. No pertinent family history.    Medications:    Current outpatient prescriptions:   •  Dapagliflozin-Metformin HCl (XIGDUO XR PO), Take  by mouth., Disp: , Rfl:   •  VICTOZA 18 MG/3ML Solution Pen-injector injection, Inject 0.3 mL as instructed every day., Disp: 3 PEN, Rfl: 6  •  Empagliflozin-Metformin HCl (SYNJARDY) 12.5-500 MG Tab, Take 1 tablet by mouth 2 Times a Day., Disp: 60 Tab, Rfl: 11  •  Insulin Pen Needle (NOVOFINE PLUS) 32G X 4 MM Misc, 1  "Applicator by Does not apply route every day. Using one needle tip with victoza per day, Disp: 100 Each, Rfl: 3  •  glucose blood (FREESTYLE LITE) strip, 1 Strip by Other route 3 times a day., Disp: 300 Strip, Rfl: 3  •  Insulin Pen Needle (NOVOFINE PLUS) 32G X 4 MM Misc, 1 Applicator by Does not apply route every day. Using one needle tip with victoza per day, Disp: 100 Each, Rfl: 3  •  losartan-hydrochlorothiazide (HYZAAR) 50-12.5 MG per tablet, Take 1 Tab by mouth every day., Disp: 30 Tab, Rfl: 11  •  lovastatin (MEVACOR) 40 MG tablet, Take 40 mg by mouth every evening., Disp: , Rfl:   •  pantoprazole (PROTONIX) 40 MG Tablet Delayed Response, Take 40 mg by mouth every day., Disp: , Rfl:   •  fluticasone-salmeterol (ADVAIR) 250-50 MCG/DOSE AEROSOL POWDER, BREATH ACTIVATED, Inhale 1 Puff by mouth every 12 hours., Disp: , Rfl:   •  albuterol (VENTOLIN OR PROVENTIL) 108 (90 BASE) MCG/ACT Aero Soln inhalation aerosol, Inhale 2 Puffs by mouth every 6 hours as needed for Shortness of Breath., Disp: 8.5 g, Rfl: 3        Physical Examination:   Vital signs: /58 mmHg  Pulse 108  Ht 1.651 m (5' 5\")  Wt 58.968 kg (130 lb)  BMI 21.63 kg/m2  SpO2 96%  General: No distress, cooperative, well dressed and well nourished.   Eyes: No scleral icterus or discharge, No hyposphagma  ENMT: Normal on external inspection of nose, lips, No nasal drainage   Neck: No abnormal masses on inspection  Resp: Normal effort, Bilateral clear to auscultation, No wheezing, No rales  CVS: Regular rate and rhythm, S1 S2 normal, No murmur. No gallop  Extremities: No edema bilateral extremities  Neuro: Alert and oriented  Skin: No rash, No Ulcers  Psych: Normal mood and affect      Assessment and Plan:    1. Uncontrolled type 2 diabetes mellitus with hyperosmolarity without coma, unspecified long term insulin use status (CMS-HCC)  She was on  Kombiglyze XR 5/1000  Glipizide 5  I had her    STOP:  Kombiglyza  Glipizide    She is now on:  Xigduo " 10/500 one in the AM  Victoza 1.8 at night     Diarrhea resolve    She has lost 9 pounds in the last month    She is not perfect but I would like to see how it goes for the next month.    2. Type 2 diabetes mellitus with hyperlipidemia (CMS-[facility])      Return in about 1 month (around 6/30/2017).    Blood glucose log: Check BG in the morning when wake up, before lunch or dinner and before bed.  So three times a day.  Always bring BG diary to the next office visit.     Thank you kindly for allowing me to participate in the diabetes care plan for this patient.    Martin Leblanc PA-C, BC-ADM  05/30/2017    CC:   Parveen Barahona M.D.

## 2017-06-07 ENCOUNTER — TELEPHONE (OUTPATIENT)
Dept: ENDOCRINOLOGY | Facility: MEDICAL CENTER | Age: 72
End: 2017-06-07

## 2017-06-07 NOTE — TELEPHONE ENCOUNTER
Pt called asking for script to be sent to pharmacy in file for her one touch lancets.   Thank you.

## 2017-07-03 ENCOUNTER — OFFICE VISIT (OUTPATIENT)
Dept: ENDOCRINOLOGY | Facility: MEDICAL CENTER | Age: 72
End: 2017-07-03
Payer: MEDICARE

## 2017-07-03 VITALS
SYSTOLIC BLOOD PRESSURE: 128 MMHG | HEIGHT: 65 IN | OXYGEN SATURATION: 97 % | DIASTOLIC BLOOD PRESSURE: 80 MMHG | WEIGHT: 134 LBS | HEART RATE: 96 BPM | BODY MASS INDEX: 22.33 KG/M2

## 2017-07-03 DIAGNOSIS — E78.5 TYPE 2 DIABETES MELLITUS WITH HYPERLIPIDEMIA (HCC): ICD-10-CM

## 2017-07-03 DIAGNOSIS — E11.00 UNCONTROLLED TYPE 2 DIABETES MELLITUS WITH HYPEROSMOLARITY WITHOUT COMA, UNSPECIFIED LONG TERM INSULIN USE STATUS: ICD-10-CM

## 2017-07-03 DIAGNOSIS — E11.69 TYPE 2 DIABETES MELLITUS WITH HYPERLIPIDEMIA (HCC): ICD-10-CM

## 2017-07-03 PROCEDURE — 99214 OFFICE O/P EST MOD 30 MIN: CPT | Performed by: PHYSICIAN ASSISTANT

## 2017-07-03 RX ORDER — PIOGLITAZONEHYDROCHLORIDE 15 MG/1
15 TABLET ORAL DAILY
Qty: 30 TAB | Refills: 11 | Status: SHIPPED | OUTPATIENT
Start: 2017-07-03 | End: 2017-07-03

## 2017-07-03 NOTE — MR AVS SNAPSHOT
"        Margy Boyer   7/3/2017 4:00 PM   Office Visit   MRN: [id number]    Department:  Endocrinology [facility]   Dept Phone:  [phone]    Description:  Female : 1945   Provider:  Yoshi Leblanc PA-C           Allergies as of 7/3/2017     Allergen Noted Reactions    Asa [Aspirin] 2013       Celecoxib 2013       Codeine 2017       Eggs 2017       Pcn [Penicillins] 2013       Tetracycline 2013       Vioxx 2013         You were diagnosed with     Type 2 diabetes mellitus with hyperlipidemia (CMS-HCC)   [6461713]       Uncontrolled type 2 diabetes mellitus with hyperosmolarity without coma, unspecified long term insulin use status (CMS-HCC)   [6484795]         Vital Signs     Blood Pressure Pulse Height Weight Body Mass Index Oxygen Saturation    128/80 mmHg 96 1.651 m (5' 5\") 60.782 kg (134 lb) 22.30 kg/m2 97%    Smoking Status                   Never Smoker            Basic Information     Date Of Birth Sex Race Ethnicity Preferred Language    1945 Female Black or  Non- English      Your appointments     2017  1:20 PM   Established Patient with Yoshi Leblanc PA-C   [facility]    [address]  [facility] [address] [phone]           You will be receiving a confirmation call a few days before your appointment from our automated call confirmation system.              Problem List              ICD-10-CM Priority Class Noted - Resolved    Fracture of metacarpal bone S62.309A   2013 - Present    Contracture of joint of hand M24.549   2015 - Present    Left hand pain M79.642   6/10/2015 - Present    Chronic low back pain M54.5, G89.29   2016 - Present    DDD (degenerative disc disease), lumbar M51.36   2016 - Present    Spondylosis of lumbosacral region without myelopathy or radiculopathy M47.817   2016 - Present    Chronic neck pain " M54.2, G89.29   9/29/2016 - Present    Osteoarthritis of spine with radiculopathy, cervical region M47.22   9/29/2016 - Present    DDD (degenerative disc disease), cervical M50.30   9/29/2016 - Present    Muscle weakness of left upper extremity M62.81   9/29/2016 - Present    Cervical stenosis of spinal canal M48.02   9/29/2016 - Present    Diabetes mellitus type II, uncontrolled (CMS-HCC) E11.65   4/25/2017 - Present    Type 2 diabetes mellitus with hyperlipidemia (CMS-HCC) E11.69, E78.5   4/25/2017 - Present      Health Maintenance        Date Due Completion Dates    DIABETES MONOFILAMENT / LE EXAM 1945 ---    FASTING LIPID PROFILE 4/16/1964 ---    URINE ACR / MICROALBUMIN 4/16/1964 ---    IMM DTaP/Tdap/Td Vaccine (1 - Tdap) 4/16/1965 ---    PAP SMEAR 4/16/1967 ---    MAMMOGRAM 4/16/1986 ---    COLONOSCOPY 4/16/1996 ---    IMM ZOSTER VACCINE 4/16/2006 ---    BONE DENSITY 4/16/2011 ---    IMM PNEUMOCOCCAL 65+ (ADULT) LOW/MEDIUM RISK SERIES (1 of 2 - PCV13) 4/16/2011 ---    A1C SCREENING 8/2/2017 2/2/2017 (Done)    Override on 2/2/2017: Done (12.9)    IMM INFLUENZA (1) 9/1/2017 ---    SERUM CREATININE 1/27/2018 1/27/2017    RETINAL SCREENING 3/16/2018 3/16/2017            Current Immunizations     No immunizations on file.      Below and/or attached are the medications your provider expects you to take. Review all of your home medications and newly ordered medications with your provider and/or pharmacist. Follow medication instructions as directed by your provider and/or pharmacist. Please keep your medication list with you and share with your provider. Update the information when medications are discontinued, doses are changed, or new medications (including over-the-counter products) are added; and carry medication information at all times in the event of emergency situations     Allergies:  ASA - (reactions not documented)     CELECOXIB - (reactions not documented)     CODEINE - (reactions not documented)      EGGS - (reactions not documented)     PCN - (reactions not documented)     TETRACYCLINE - (reactions not documented)     VIOXX - (reactions not documented)               Medications  Valid as of: July 03, 2017 -  4:19 PM    Generic Name Brand Name Tablet Size Instructions for use    Albuterol Sulfate (Aero Soln) albuterol 108 (90 BASE) MCG/ACT Inhale 2 Puffs by mouth every 6 hours as needed for Shortness of Breath.        Empagliflozin-Metformin HCl (Tab) Empagliflozin-Metformin HCl 12.5-500 MG Take 1 tablet by mouth 2 Times a Day.        Fluticasone-Salmeterol (AEROSOL POWDER, BREATH ACTIVATED) ADVAIR 250-50 MCG/DOSE Inhale 1 Puff by mouth every 12 hours.        Glucose Blood (Strip) glucose blood  1 Strip by Other route 3 times a day.        Glucose Blood (Strip) glucose blood  1 Strip by Other route 3 times a day.        Insulin Pen Needle (Misc) Insulin Pen Needle 32G X 4 MM 1 Applicator by Does not apply route every day. Using one needle tip with victoza per day        Lancets (Misc) ONETOUCH DELICA LANCETS FINE  1 Stick by Does not apply route 2 Times a Day.        Liraglutide (Solution Pen-injector) VICTOZA 18 MG/3ML Inject 0.3 mL as instructed every day.        Losartan Potassium-HCTZ (Tab) HYZAAR 50-12.5 MG Take 1 Tab by mouth every day.        Lovastatin (Tab) MEVACOR 40 MG Take 40 mg by mouth every evening.        Pantoprazole Sodium (Tablet Delayed Response) PROTONIX 40 MG Take 40 mg by mouth every day.        .                 Medicines prescribed today were sent to:     [address]), [address]) [place] [address]    Phone: [phone] Fax: [phone]    Open 24 Hours?: No      Medication refill instructions:       If your prescription bottle indicates you have medication refills left, it is not necessary to call your provider’s office. Please contact your pharmacy and they will refill your medication.    If your prescription  bottle indicates you do not have any refills left, you may request refills at any time through one of the following ways: The online [facility]Central system (except Urgent Care), by calling your provider’s office, or by asking your pharmacy to contact your provider’s office with a refill request. Medication refills are processed only during regular business hours and may not be available until the next business day. Your provider may request additional information or to have a follow-up visit with you prior to refilling your medication.   *Please Note: Medication refills are assigned a new Rx number when refilled electronically. Your pharmacy may indicate that no refills were authorized even though a new prescription for the same medication is available at the pharmacy. Please request the medicine by name with the pharmacy before contacting your provider for a refill.           HealthCentral Access Code: [email or web address]  Expires: 8/2/2017  4:19 PM    [facility]Central  A secure, online tool to manage your health information     [employer]’s HealthCentral® is a secure, online tool that connects you to your personalized health information from the privacy of your home -- day or night - making it very easy for you to manage your healthcare. Once the activation process is completed, you can even access your medical information using the HealthCentral parmjit, which is available for free in the Apple Parmjit store or Google Play store.     HealthCentral provides the following levels of access (as shown below):   My Chart Features   [facility] Doctor [facility]  Urgent  Care Non-[facility]  Primary Care  Doctor   Email your healthcare team securely and privately 24/7 X X X    Manage appointments: schedule your next appointment; view details of past/upcoming appointments X      Request prescription refills. X      View recent personal medical records, including lab and immunizations X X X X   View health record, including health history,  allergies, medications X X X X   Read reports about your outpatient visits, procedures, consult and ER notes X X X X   See your discharge summary, which is a recap of your hospital and/or ER visit that includes your diagnosis, lab results, and care plan. X X       How to register for Gem:  1. Go to  [email or web address].  2. Click on the Sign Up Now box, which takes you to the New Member Sign Up page. You will need to provide the following information:  a. Enter your Gem Access Code exactly as it appears at the top of this page. (You will not need to use this code after you’ve completed the sign-up process. If you do not sign up before the expiration date, you must request a new code.)   b. Enter your date of birth.   c. Enter your home email address.   d. Click Submit, and follow the next screen’s instructions.  3. Create a Gem ID. This will be your Gem login ID and cannot be changed, so think of one that is secure and easy to remember.  4. Create a Gem password. You can change your password at any time.  5. Enter your Password Reset Question and Answer. This can be used at a later time if you forget your password.   6. Enter your e-mail address. This allows you to receive e-mail notifications when new information is available in Gem.  7. Click Sign Up. You can now view your health information.    For assistance activating your Gem account, call [phone]

## 2017-07-03 NOTE — PROGRESS NOTES
Return to office Patient Consult Note  Referred by: Parveen Barahona M.D.    Reason for consult: Diabetes Management Type 2    HPI:  Margy Boyer is a 72 y.o. old patient who is seeing us today for diabetes care.  This is a pleasant patient with diabetes and I appreciate the opportunity to participate in the care of this patient.    BG Diary:7/3/2017  In the AM: 138, 136, 130, 142, 132, 112, 157, 151, 145, 143, 137  Before meal:  Before Bed: 164, 213, 142, 128, 161, 188, 176, 144, 144, 192    BG Diary:5/30/2017  In the AM: 125, 136, 141, 127, 142, 144, 168, 129,  Before meal:  Before Bed: 200, 133, 200, 118, 355, 150     BG Diary:5/22/2017  In the AM:130, 134, 115, 104, 136, 133, 125  Before meal: 160, 112, 226, 153, 183, 172  Before Bed: 153, 92, 218, 127, 142, 198    BG Diary:5/15/2017  In the AM: forgot to bring    BG Diary:5/3/2017  In the AM: 140, 120, 106, 98, 104,    Before meal: 133, 95, 188, 134    Before Bed: 127, 113, 134, 105, 160     2/12/17 HbA1c 12.9,  GFR 59    BG Diary:4/25/2017  In the AM: 135, 116, 123, 115, 142, 132, 124  Just before meal: 137, 216, 179, 59, 120, 138,    Just before Bed: 150, 280, 207, 218, 193, 174      Weight:She was 139 on 4/25/17 and today is 130    1. Type 2 diabetes mellitus with hyperlipidemia (CMS-[facility])  She was on  Kombiglyze XR 5/1000  Glipizide 5  I had her    STOP:  Kombiglyza  Glipizide    She is now on:  Xigduo 10/500 one in the AM  Victoza 1.8 at night     Diarrhea resolve    She is not perfect but I would like to see how it goes for the next month.    2. Uncontrolled type 2 diabetes mellitus with hyperosmolarity without coma, unspecified long term insulin use status (CMS-HCC)    She is not perfectly controlled and I would like to see her back in 2 weeks.  She does not want to take.        ROS:   Constitutional: No night sweats.  Eyes:  No visual changes.  Cardiac: No chest pain, No palpitations or racing heart rate.  Resp: No shortness of breath, No cough,    Gi: No Diarrhea    All other systems were reviewed and were/are negative.  The ROS was revised/revisited during this office visit from the patients first office visit with me on 4/25/17 Please review the full ROS during the first office visit.    Past Medical History:  Patient Active Problem List    Diagnosis Date Noted   • Diabetes mellitus type II, uncontrolled (CMS-HCC) 04/25/2017   • Type 2 diabetes mellitus with hyperlipidemia (CMS-HCC) 04/25/2017   • Chronic low back pain 09/29/2016   • DDD (degenerative disc disease), lumbar 09/29/2016   • Spondylosis of lumbosacral region without myelopathy or radiculopathy 09/29/2016   • Chronic neck pain 09/29/2016   • Osteoarthritis of spine with radiculopathy, cervical region 09/29/2016   • DDD (degenerative disc disease), cervical 09/29/2016   • Muscle weakness of left upper extremity 09/29/2016   • Cervical stenosis of spinal canal 09/29/2016   • Left hand pain 06/10/2015   • Contracture of joint of hand 01/05/2015   • Fracture of metacarpal bone 12/23/2013       Past Surgical History:  Past Surgical History   Procedure Laterality Date   • Gyn surgery       tubal ligation   • Other orthopedic surgery  ?2012     Right knee       Allergies:  Asa; Celecoxib; Codeine; Eggs; Pcn; Tetracycline; and Vioxx    Social History:  Social History     Social History   • Marital Status: Single     Spouse Name: N/A   • Number of Children: N/A   • Years of Education: N/A     Occupational History   • Not on file.     Social History Main Topics   • Smoking status: Never Smoker    • Smokeless tobacco: Never Used   • Alcohol Use: No   • Drug Use: No   • Sexual Activity: Not on file     Other Topics Concern   • Not on file     Social History Narrative       Family History:  History reviewed. No pertinent family history.    Medications:    Current outpatient prescriptions:   •  glucose blood (ONETOUCH VERIO) strip, 1 Strip by Other route 3 times a day., Disp: 100 Strip, Rfl: 11  •   "Empagliflozin-Metformin HCl (SYNJARDY) 12.5-500 MG Tab, Take 1 tablet by mouth 2 Times a Day., Disp: 60 Tab, Rfl: 11  •  ONETOUCH DELICA LANCETS FINE Misc, 1 Stick by Does not apply route 2 Times a Day., Disp: 50 Each, Rfl: 11  •  VICTOZA 18 MG/3ML Solution Pen-injector injection, Inject 0.3 mL as instructed every day., Disp: 3 PEN, Rfl: 6  •  glucose blood (FREESTYLE LITE) strip, 1 Strip by Other route 3 times a day., Disp: 300 Strip, Rfl: 3  •  Insulin Pen Needle (NOVOFINE PLUS) 32G X 4 MM Misc, 1 Applicator by Does not apply route every day. Using one needle tip with victoza per day, Disp: 100 Each, Rfl: 3  •  losartan-hydrochlorothiazide (HYZAAR) 50-12.5 MG per tablet, Take 1 Tab by mouth every day., Disp: 30 Tab, Rfl: 11  •  lovastatin (MEVACOR) 40 MG tablet, Take 40 mg by mouth every evening., Disp: , Rfl:   •  pantoprazole (PROTONIX) 40 MG Tablet Delayed Response, Take 40 mg by mouth every day., Disp: , Rfl:   •  albuterol (VENTOLIN OR PROVENTIL) 108 (90 BASE) MCG/ACT Aero Soln inhalation aerosol, Inhale 2 Puffs by mouth every 6 hours as needed for Shortness of Breath., Disp: 8.5 g, Rfl: 3  •  fluticasone-salmeterol (ADVAIR) 250-50 MCG/DOSE AEROSOL POWDER, BREATH ACTIVATED, Inhale 1 Puff by mouth every 12 hours., Disp: , Rfl:         Physical Examination:   Vital signs: /80 mmHg  Pulse 96  Ht 1.651 m (5' 5\")  Wt 60.782 kg (134 lb)  BMI 22.30 kg/m2  SpO2 97%  General: No distress, cooperative, well dressed and well nourished.   Eyes: No scleral icterus or discharge, No hyposphagma  ENMT: Normal on external inspection of nose, lips, No nasal drainage   Neck: No abnormal masses on inspection  Resp: Normal effort, Bilateral clear to auscultation, No wheezing, No rales  CVS: Regular rate and rhythm, S1 S2 normal, No murmur. No gallop  Extremities: No edema bilateral extremities  Neuro: Alert and oriented  Skin: No rash, No Ulcers  Psych: Normal mood and affect      Assessment and Plan:    1. Type 2 " diabetes mellitus with hyperlipidemia (CMS-HCC)  She was on  Kombiglyze XR 5/1000  Glipizide 5  I had her    STOP:  Kombiglyza  Glipizide    She is now on:  1.   Synjardy 5/500 one po BID (change out for insurance Xigduo)  2.   Victoza 1.8 at night     Diarrhea resolve    She is not perfect but I would like to see how it goes for the next month.      2. Uncontrolled type 2 diabetes mellitus with hyperosmolarity without coma, unspecified long term insulin use status (CMS-HCC)    She is not perfectly controlled and I would like to see her back in 2 weeks.  She does not want to take.      The total time spent seeing this patient today face to face in consultation, and formulating an action plan for this visit was greater than 25 minutes. > Than 50% of this time was spent counseling, discussing problems documented above and below, coordinating care and answering questions by the physician assistant.  We developed a diabetes care plan for this patient today.      Return in about 2 weeks (around 7/17/2017).    Blood glucose log: Check BG in the morning when wake up, before lunch or dinner and before bed.  So three times a day.  Always bring BG diary to the next office visit.     This patient during there office visit today was started on a new medication Actos.  Side effects of the new medication were discussed with the patient today in the office.       Thank you kindly for allowing me to participate in the diabetes care plan for this patient.    Martin Leblanc PA-C, BC-ADM  07/03/2017    CC:   Parveen Barahona M.D.

## 2017-07-06 NOTE — TELEPHONE ENCOUNTER
Insurance is no longer paying for the one touch, now paying for the Freestyle.     Pt is in need of Test strips and Lancets    Please send to [facility] in file.

## 2017-07-10 RX ORDER — BLOOD-GLUCOSE METER
1 KIT MISCELLANEOUS 3 TIMES DAILY
Qty: 1 KIT | Refills: 1 | Status: SHIPPED | OUTPATIENT
Start: 2017-07-10 | End: 2017-08-25

## 2017-07-10 RX ORDER — DAPAGLIFLOZIN AND METFORMIN HYDROCHLORIDE 10; 500 MG/1; MG/1
1 TABLET, FILM COATED, EXTENDED RELEASE ORAL 2 TIMES DAILY
Qty: 30 TAB | Refills: 0 | Status: SHIPPED | OUTPATIENT
Start: 2017-07-10 | End: 2017-08-07 | Stop reason: CLARIF

## 2017-07-10 RX ORDER — DAPAGLIFLOZIN AND METFORMIN HYDROCHLORIDE 10; 500 MG/1; MG/1
TABLET, FILM COATED, EXTENDED RELEASE ORAL
COMMUNITY
Start: 2017-05-16 | End: 2017-07-10 | Stop reason: SDUPTHER

## 2017-07-17 ENCOUNTER — APPOINTMENT (OUTPATIENT)
Dept: ENDOCRINOLOGY | Facility: MEDICAL CENTER | Age: 72
End: 2017-07-17
Payer: MEDICARE

## 2017-08-07 ENCOUNTER — OFFICE VISIT (OUTPATIENT)
Dept: ENDOCRINOLOGY | Facility: MEDICAL CENTER | Age: 72
End: 2017-08-07
Payer: MEDICARE

## 2017-08-07 VITALS
HEART RATE: 103 BPM | HEIGHT: 65 IN | WEIGHT: 128 LBS | SYSTOLIC BLOOD PRESSURE: 98 MMHG | DIASTOLIC BLOOD PRESSURE: 62 MMHG | OXYGEN SATURATION: 98 % | BODY MASS INDEX: 21.33 KG/M2

## 2017-08-07 DIAGNOSIS — E11.00 UNCONTROLLED TYPE 2 DIABETES MELLITUS WITH HYPEROSMOLARITY WITHOUT COMA, WITHOUT LONG-TERM CURRENT USE OF INSULIN (HCC): ICD-10-CM

## 2017-08-07 DIAGNOSIS — E78.5 TYPE 2 DIABETES MELLITUS WITH HYPERLIPIDEMIA (HCC): ICD-10-CM

## 2017-08-07 DIAGNOSIS — E11.69 TYPE 2 DIABETES MELLITUS WITH HYPERLIPIDEMIA (HCC): ICD-10-CM

## 2017-08-07 LAB
HBA1C MFR BLD: 7.2 % (ref ?–5.8)
INT CON NEG: NEGATIVE
INT CON POS: POSITIVE

## 2017-08-07 PROCEDURE — 83036 HEMOGLOBIN GLYCOSYLATED A1C: CPT | Performed by: PHYSICIAN ASSISTANT

## 2017-08-07 PROCEDURE — 99214 OFFICE O/P EST MOD 30 MIN: CPT | Performed by: PHYSICIAN ASSISTANT

## 2017-08-07 NOTE — PATIENT INSTRUCTIONS
Her HbA1c is 7.2 in the office today on 8/7/17.  This is down from 12.9 3 months ago.     Cleared for surgery from our standpoint with respect to Diabetes  She is now on:  1.  Synjardy 5/500 BID  2.  Victoza 1.8 at night

## 2017-08-07 NOTE — PROGRESS NOTES
Return to office Patient Consult Note  Referred by: Parveen Barahona M.D.    Reason for consult: Diabetes Management Type 2    HPI:  Margy Boyer is a 72 y.o. old patient who is seeing us today for diabetes care.  This is a pleasant patient with diabetes and I appreciate the opportunity to participate in the care of this patient.    BG Diary:8/7/2017  In the AM: 124, 127, 117, 118, 95, 123, 111, 113, 119, 103  Before meal: 122, 109, 108, 96, 112, 75, 113, 94   Before Bed: 105, 232, 122, 94, 112, 106, 122, 190, 102, 130      BG Diary:7/3/2017  In the AM: 138, 136, 130, 142, 132, 112, 157, 151, 145, 143, 137  Before meal:  Before Bed: 164, 213, 142, 128, 161, 188, 176, 144, 144, 192    BG Diary:5/30/2017  In the AM: 125, 136, 141, 127, 142, 144, 168, 129,  Before meal:  Before Bed: 200, 133, 200, 118, 355, 150     BG Diary:5/22/2017  In the AM:130, 134, 115, 104, 136, 133, 125  Before meal: 160, 112, 226, 153, 183, 172  Before Bed: 153, 92, 218, 127, 142, 198    BG Diary:5/15/2017  In the AM: forgot to bring    BG Diary:5/3/2017  In the AM: 140, 120, 106, 98, 104,    Before meal: 133, 95, 188, 134    Before Bed: 127, 113, 134, 105, 160     2/12/17 HbA1c 12.9,  GFR 59  8/7/17 HbA1c is 7.2    BG Diary:4/25/2017  In the AM: 135, 116, 123, 115, 142, 132, 124  Just before meal: 137, 216, 179, 59, 120, 138,    Just before Bed: 150, 280, 207, 218, 193, 174      Weight:She was 139 on 4/25/17 and today is 128      1. Uncontrolled type 2 diabetes mellitus with hyperosmolarity without coma, without long-term current use of insulin (CMS-HCC)  She was on  Kombiglyze XR 5/1000  Glipizide 5  I had her      2. Type 2 diabetes mellitus with hyperlipidemia (CMS-HCC)    She is now on:  1.  Synjardy 5/500 BID  2.  Victoza 1.8 at night     ROS:   Constitutional: No night sweats.  Eyes:  No visual changes.  Cardiac: No chest pain, No palpitations or racing heart rate.  Resp: No shortness of breath, No cough,   Gi: No Diarrhea    All  other systems were reviewed and were/are negative.  The ROS was revised/revisited during this office visit from the patients first office visit with me on 4/25/17 Please review the full ROS during the first office visit.    Past Medical History:  Patient Active Problem List    Diagnosis Date Noted   • Diabetes mellitus type II, uncontrolled (CMS-HCC) 04/25/2017   • Type 2 diabetes mellitus with hyperlipidemia (CMS-HCC) 04/25/2017   • Chronic low back pain 09/29/2016   • DDD (degenerative disc disease), lumbar 09/29/2016   • Spondylosis of lumbosacral region without myelopathy or radiculopathy 09/29/2016   • Chronic neck pain 09/29/2016   • Osteoarthritis of spine with radiculopathy, cervical region 09/29/2016   • DDD (degenerative disc disease), cervical 09/29/2016   • Muscle weakness of left upper extremity 09/29/2016   • Cervical stenosis of spinal canal 09/29/2016   • Left hand pain 06/10/2015   • Contracture of joint of hand 01/05/2015   • Fracture of metacarpal bone 12/23/2013       Past Surgical History:  Past Surgical History   Procedure Laterality Date   • Gyn surgery       tubal ligation   • Other orthopedic surgery  ?2012     Right knee       Allergies:  Asa; Celecoxib; Codeine; Eggs; Pcn; Tetracycline; and Vioxx    Social History:  Social History     Social History   • Marital Status: Single     Spouse Name: N/A   • Number of Children: N/A   • Years of Education: N/A     Occupational History   • Not on file.     Social History Main Topics   • Smoking status: Never Smoker    • Smokeless tobacco: Never Used   • Alcohol Use: No   • Drug Use: No   • Sexual Activity: Not on file     Other Topics Concern   • Not on file     Social History Narrative       Family History:  History reviewed. No pertinent family history.    Medications:    Current outpatient prescriptions:   •  Blood Glucose Monitoring Suppl (FREESTYLE FREEDOM LITE) W/DEVICE Kit, 1 Kit by Does not apply route 3 times a day., Disp: 1 Kit, Rfl: 1  •   "Empagliflozin-Metformin HCl 5-500 MG Tab, Take 1 Tab by mouth 2 Times a Day., Disp: 60 Tab, Rfl: 11  •  XIGDUO XR  MG TABLET SR 24 HR, Take 1 Tab by mouth 2 times a day., Disp: 30 Tab, Rfl: 0  •  glucose blood (FREESTYLE LITE) strip, 1 Strip by Other route 3 times a day., Disp: 300 Strip, Rfl: 3  •  VICTOZA 18 MG/3ML Solution Pen-injector injection, Inject 0.3 mL as instructed every day., Disp: 3 PEN, Rfl: 6  •  Insulin Pen Needle (NOVOFINE PLUS) 32G X 4 MM Misc, 1 Applicator by Does not apply route every day. Using one needle tip with victoza per day, Disp: 100 Each, Rfl: 3  •  losartan-hydrochlorothiazide (HYZAAR) 50-12.5 MG per tablet, Take 1 Tab by mouth every day., Disp: 30 Tab, Rfl: 11  •  lovastatin (MEVACOR) 40 MG tablet, Take 40 mg by mouth every evening., Disp: , Rfl:   •  pantoprazole (PROTONIX) 40 MG Tablet Delayed Response, Take 40 mg by mouth every day., Disp: , Rfl:   •  albuterol (VENTOLIN OR PROVENTIL) 108 (90 BASE) MCG/ACT Aero Soln inhalation aerosol, Inhale 2 Puffs by mouth every 6 hours as needed for Shortness of Breath., Disp: 8.5 g, Rfl: 3  •  fluticasone-salmeterol (ADVAIR) 250-50 MCG/DOSE AEROSOL POWDER, BREATH ACTIVATED, Inhale 1 Puff by mouth every 12 hours., Disp: , Rfl:         Physical Examination:   Vital signs: BP 98/62 mmHg  Pulse 103  Ht 1.651 m (5' 5\")  Wt 58.06 kg (128 lb)  BMI 21.30 kg/m2  SpO2 98%  General: No distress, cooperative, well dressed and well nourished.   Eyes: No scleral icterus or discharge, No hyposphagma  ENMT: Normal on external inspection of nose, lips, No nasal drainage   Neck: No abnormal masses on inspection  Resp: Normal effort, Bilateral clear to auscultation, No wheezing, No rales  CVS: Regular rate and rhythm, S1 S2 normal, No murmur. No gallop  Extremities: No edema bilateral extremities  Neuro: Alert and oriented  Skin: No rash, No Ulcers  Psych: Normal mood and affect      Assessment and Plan:    1. Uncontrolled type 2 diabetes mellitus " with hyperosmolarity without coma, without long-term current use of insulin (CMS-HCC)  She was on  Kombiglyze XR 5/1000  Glipizide 5  I had her      2. Type 2 diabetes mellitus with hyperlipidemia (CMS-HCC)    She is now on:  1.  Synjardy 5/500 BID  2.  Victoza 1.8 at night     She is doing very well      Return in about 6 months (around 2/7/2018).    Blood glucose log: Check BG in the morning when wake up, before lunch or dinner and before bed.  So three times a day.  Always bring BG diary to the next office visit.     Thank you kindly for allowing me to participate in the diabetes care plan for this patient.    Matrin Leblanc PA-C, BC-[facility]  Board Certified - Advanced Diabetes Management  08/07/2017    CC:   Parveen Barahona M.D.

## 2017-08-07 NOTE — MR AVS SNAPSHOT
"        Margy Boyer   2017 2:40 PM   Office Visit   MRN: [id number]    Department:  Endocrinology [facility]   Dept Phone:  [phone]    Description:  Female : 1945   Provider:  Yoshi Leblanc PA-C           Reason for Visit     Diabetes           Allergies as of 2017     Allergen Noted Reactions    Asa [Aspirin] 2013       Celecoxib 2013       Codeine 2017       Eggs 2017       Pcn [Penicillins] 2013       Tetracycline 2013       Vioxx 2013         You were diagnosed with     Uncontrolled type 2 diabetes mellitus with hyperosmolarity without coma, without long-term current use of insulin (CMS-HCC)   [6329045]       Type 2 diabetes mellitus with hyperlipidemia (CMS-HCC)   [5734979]         Vital Signs     Blood Pressure Pulse Height Weight Body Mass Index Oxygen Saturation    98/62 mmHg 103 1.651 m (5' 5\") 58.06 kg (128 lb) 21.30 kg/m2 98%    Smoking Status                   Never Smoker            Basic Information     Date Of Birth Sex Race Ethnicity Preferred Language    1945 Female Black or  Non- English      Your appointments     2018  2:40 PM   Established Patient with Yoshi Leblanc PA-C   [facility]    [id number] [place] [address], [address]  [facility] [address] [phone]           You will be receiving a confirmation call a few days before your appointment from our automated call confirmation system.              Problem List              ICD-10-CM Priority Class Noted - Resolved    Fracture of metacarpal bone S62.309A   2013 - Present    Contracture of joint of hand M24.549   2015 - Present    Left hand pain M79.642   6/10/2015 - Present    Chronic low back pain M54.5, G89.29   2016 - Present    DDD (degenerative disc disease), lumbar M51.36   2016 - Present    Spondylosis of lumbosacral region without myelopathy or radiculopathy M47.817   " 9/29/2016 - Present    Chronic neck pain M54.2, G89.29   9/29/2016 - Present    Osteoarthritis of spine with radiculopathy, cervical region M47.22   9/29/2016 - Present    DDD (degenerative disc disease), cervical M50.30   9/29/2016 - Present    Muscle weakness of left upper extremity M62.81   9/29/2016 - Present    Cervical stenosis of spinal canal M48.02   9/29/2016 - Present    Diabetes mellitus type II, uncontrolled (CMS-HCC) E11.65   4/25/2017 - Present    Type 2 diabetes mellitus with hyperlipidemia (CMS-HCC) E11.69, E78.5   4/25/2017 - Present      Health Maintenance        Date Due Completion Dates    DIABETES MONOFILAMENT / LE EXAM 1945 ---    FASTING LIPID PROFILE 4/16/1964 ---    URINE ACR / MICROALBUMIN 4/16/1964 ---    IMM DTaP/Tdap/Td Vaccine (1 - Tdap) 4/16/1965 ---    PAP SMEAR 4/16/1967 ---    MAMMOGRAM 4/16/1986 ---    COLONOSCOPY 4/16/1996 ---    IMM ZOSTER VACCINE 4/16/2006 ---    BONE DENSITY 4/16/2011 ---    IMM PNEUMOCOCCAL 65+ (ADULT) LOW/MEDIUM RISK SERIES (1 of 2 - PCV13) 4/16/2011 ---    A1C SCREENING 8/2/2017 2/2/2017 (Done)    Override on 2/2/2017: Done (12.9)    IMM INFLUENZA (1) 9/1/2017 ---    SERUM CREATININE 1/27/2018 1/27/2017    RETINAL SCREENING 7/26/2018 7/26/2017, 7/25/2017, 3/16/2017            Results     POCT Hemoglobin A1C      Component    Glycohemoglobin    7.2    Internal Control Negative    Negative    Internal Control Positive    Positive                        Current Immunizations     No immunizations on file.      Below and/or attached are the medications your provider expects you to take. Review all of your home medications and newly ordered medications with your provider and/or pharmacist. Follow medication instructions as directed by your provider and/or pharmacist. Please keep your medication list with you and share with your provider. Update the information when medications are discontinued, doses are changed, or new medications (including  over-the-counter products) are added; and carry medication information at all times in the event of emergency situations     Allergies:  ASA - (reactions not documented)     CELECOXIB - (reactions not documented)     CODEINE - (reactions not documented)     EGGS - (reactions not documented)     PCN - (reactions not documented)     TETRACYCLINE - (reactions not documented)     VIOXX - (reactions not documented)               Medications  Valid as of: August 07, 2017 -  3:23 PM    Generic Name Brand Name Tablet Size Instructions for use    Albuterol Sulfate (Aero Soln) albuterol 108 (90 BASE) MCG/ACT Inhale 2 Puffs by mouth every 6 hours as needed for Shortness of Breath.        Blood Glucose Monitoring Suppl (Kit) FREESTYLE FREEDOM LITE W/DEVICE 1 Kit by Does not apply route 3 times a day.        Empagliflozin-Metformin HCl (Tab) Empagliflozin-Metformin HCl 5-500 MG Take 1 Tab by mouth 2 Times a Day.        Fluticasone-Salmeterol (AEROSOL POWDER, BREATH ACTIVATED) ADVAIR 250-50 MCG/DOSE Inhale 1 Puff by mouth every 12 hours.        Glucose Blood (Strip) glucose blood  1 Strip by Other route 3 times a day.        Insulin Pen Needle (Misc) Insulin Pen Needle 32G X 4 MM 1 Applicator by Does not apply route every day. Using one needle tip with victoza per day        Liraglutide (Solution Pen-injector) VICTOZA 18 MG/3ML Inject 0.3 mL as instructed every day.        Losartan Potassium-HCTZ (Tab) HYZAAR 50-12.5 MG Take 1 Tab by mouth every day.        Lovastatin (Tab) MEVACOR 40 MG Take 40 mg by mouth every evening.        Pantoprazole Sodium (Tablet Delayed Response) PROTONIX 40 MG Take 40 mg by mouth every day.        .                 Medicines prescribed today were sent to:     [facility] [address] [place] [address] (N), [place] - [address]    [address] [facility] (N) [place] [address]    Phone: [phone] Fax: [phone]    Open 24 Hours?: No      Medication refill instructions:       If your prescription bottle  indicates you have medication refills left, it is not necessary to call your provider’s office. Please contact your pharmacy and they will refill your medication.    If your prescription bottle indicates you do not have any refills left, you may request refills at any time through one of the following ways: The online AgLocal system (except Urgent Care), by calling your provider’s office, or by asking your pharmacy to contact your provider’s office with a refill request. Medication refills are processed only during regular business hours and may not be available until the next business day. Your provider may request additional information or to have a follow-up visit with you prior to refilling your medication.   *Please Note: Medication refills are assigned a new Rx number when refilled electronically. Your pharmacy may indicate that no refills were authorized even though a new prescription for the same medication is available at the pharmacy. Please request the medicine by name with the pharmacy before contacting your provider for a refill.        Instructions    Her HbA1c is 7.2 in the office today on 8/7/17.  This is down from 12.9 3 months ago.     Cleared for surgery from our standpoint with respect to Diabetes  She is now on:  1.  Synjardy 5/500 BID  2.  Victoza 1.8 at night           AgLocal Access Code: [email or web address]  Expires: 9/6/2017  3:23 PM    AgLocal  A secure, online tool to manage your health information     [email or web address]’s AgLocal® is a secure, online tool that connects you to your personalized health information from the privacy of your home -- day or night - making it very easy for you to manage your healthcare. Once the activation process is completed, you can even access your medical information using the AgLocal parmjit, which is available for free in the Apple Parmjit store or Google Play store.     AgLocal provides the following levels of access (as shown below):   My Chart Features   [facility]  Primary Care Doctor [facility]  Specialists [facility]  Urgent  Care Non-[facility]  Primary Care  Doctor   Email your healthcare team securely and privately 24/7 X X X    Manage appointments: schedule your next appointment; view details of past/upcoming appointments X      Request prescription refills. X      View recent personal medical records, including lab and immunizations X X X X   View health record, including health history, allergies, medications X X X X   Read reports about your outpatient visits, procedures, consult and ER notes X X X X   See your discharge summary, which is a recap of your hospital and/or ER visit that includes your diagnosis, lab results, and care plan. X X       How to register for Hum[employer]ade:  1. Go to  [email or web address].  2. Click on the Sign Up Now box, which takes you to the New Member Sign Up page. You will need to provide the following information:  a. Enter your Humagade Access Code exactly as it appears at the top of this page. (You will not need to use this code after you’ve completed the sign-up process. If you do not sign up before the expiration date, you must request a new code.)   b. Enter your date of birth.   c. Enter your home email address.   d. Click Submit, and follow the next screen’s instructions.  3. Create a Humagade ID. This will be your Humagade login ID and cannot be changed, so think of one that is secure and easy to remember.  4. Create a Humagade password. You can change your password at any time.  5. Enter your Password Reset Question and Answer. This can be used at a later time if you forget your password.   6. Enter your e-mail address. This allows you to receive e-mail notifications when new information is available in Humagade.  7. Click Sign Up. You can now view your health information.    For assistance activating your Humagade account, call [phone]

## 2017-08-08 ENCOUNTER — TELEPHONE (OUTPATIENT)
Dept: ENDOCRINOLOGY | Facility: MEDICAL CENTER | Age: 72
End: 2017-08-08

## 2017-08-08 NOTE — TELEPHONE ENCOUNTER
Margy called states dr Ferreira needs medical clearance letter for the surgery she is getting done. Needs A1c number and if ok to get surgery scheduled.  Dr Ferreira # [phone] Fax [phone]

## 2017-08-14 ENCOUNTER — HOSPITAL ENCOUNTER (OUTPATIENT)
Facility: MEDICAL CENTER | Age: 72
End: 2017-08-14
Attending: NEUROLOGICAL SURGERY | Admitting: NEUROLOGICAL SURGERY
Payer: MEDICARE

## 2017-08-21 ENCOUNTER — TELEPHONE (OUTPATIENT)
Dept: ENDOCRINOLOGY | Facility: MEDICAL CENTER | Age: 72
End: 2017-08-21

## 2017-08-21 NOTE — TELEPHONE ENCOUNTER
Pt called states dr Barahona was trying to call to speak with Martin, but could not get a hold Martin.  Pt states per dr Barahona she stopped Victoza and Synjardy because they were giving her constipation and dehydration.

## 2017-08-22 NOTE — TELEPHONE ENCOUNTER
Patient has an appointment in 3 days will discuss then.  She had been on these meds for over 3 months with no issues.

## 2017-08-25 ENCOUNTER — HOSPITAL ENCOUNTER (OUTPATIENT)
Dept: RADIOLOGY | Facility: MEDICAL CENTER | Age: 72
End: 2017-08-25
Attending: NEUROLOGICAL SURGERY
Payer: MEDICARE

## 2017-08-25 VITALS — BODY MASS INDEX: 21.27 KG/M2 | HEIGHT: 65 IN | WEIGHT: 127.65 LBS

## 2017-08-25 DIAGNOSIS — Z01.810 PRE-OPERATIVE CARDIOVASCULAR EXAMINATION: ICD-10-CM

## 2017-08-25 DIAGNOSIS — Z01.812 PRE-OPERATIVE LABORATORY EXAMINATION: ICD-10-CM

## 2017-08-25 DIAGNOSIS — Z01.811 PRE-OPERATIVE RESPIRATORY EXAMINATION: ICD-10-CM

## 2017-08-25 LAB
25(OH)D3 SERPL-MCNC: 35 NG/ML (ref 30–100)
ANION GAP SERPL CALC-SCNC: 9 MMOL/L (ref 0–11.9)
APPEARANCE UR: CLEAR
APTT PPP: 23.8 SEC (ref 24.7–36)
BASOPHILS # BLD AUTO: 0.7 % (ref 0–1.8)
BASOPHILS # BLD: 0.03 K/UL (ref 0–0.12)
BILIRUB UR QL STRIP.AUTO: NEGATIVE
BUN SERPL-MCNC: 22 MG/DL (ref 8–22)
CALCIUM SERPL-MCNC: 9.7 MG/DL (ref 8.5–10.5)
CHLORIDE SERPL-SCNC: 101 MMOL/L (ref 96–112)
CO2 SERPL-SCNC: 27 MMOL/L (ref 20–33)
COLOR UR: YELLOW
CREAT SERPL-MCNC: 1.2 MG/DL (ref 0.5–1.4)
CULTURE IF INDICATED INDCX: NO UA CULTURE
EKG IMPRESSION: NORMAL
EOSINOPHIL # BLD AUTO: 0.08 K/UL (ref 0–0.51)
EOSINOPHIL NFR BLD: 1.9 % (ref 0–6.9)
ERYTHROCYTE [DISTWIDTH] IN BLOOD BY AUTOMATED COUNT: 42.1 FL (ref 35.9–50)
GFR SERPL CREATININE-BSD FRML MDRD: 44 ML/MIN/1.73 M 2
GLUCOSE SERPL-MCNC: 199 MG/DL (ref 65–99)
GLUCOSE UR STRIP.AUTO-MCNC: >=1000 MG/DL
HCT VFR BLD AUTO: 45.2 % (ref 37–47)
HGB BLD-MCNC: 15.3 G/DL (ref 12–16)
IMM GRANULOCYTES # BLD AUTO: 0.01 K/UL (ref 0–0.11)
IMM GRANULOCYTES NFR BLD AUTO: 0.2 % (ref 0–0.9)
INR PPP: 0.95 (ref 0.87–1.13)
KETONES UR STRIP.AUTO-MCNC: NEGATIVE MG/DL
LEUKOCYTE ESTERASE UR QL STRIP.AUTO: NEGATIVE
LYMPHOCYTES # BLD AUTO: 1.56 K/UL (ref 1–4.8)
LYMPHOCYTES NFR BLD: 36.7 % (ref 22–41)
MCH RBC QN AUTO: 31.4 PG (ref 27–33)
MCHC RBC AUTO-ENTMCNC: 33.8 G/DL (ref 33.6–35)
MCV RBC AUTO: 92.8 FL (ref 81.4–97.8)
MICRO URNS: ABNORMAL
MONOCYTES # BLD AUTO: 0.35 K/UL (ref 0–0.85)
MONOCYTES NFR BLD AUTO: 8.2 % (ref 0–13.4)
NEUTROPHILS # BLD AUTO: 2.22 K/UL (ref 2–7.15)
NEUTROPHILS NFR BLD: 52.3 % (ref 44–72)
NITRITE UR QL STRIP.AUTO: NEGATIVE
NRBC # BLD AUTO: 0 K/UL
NRBC BLD AUTO-RTO: 0 /100 WBC
PH UR STRIP.AUTO: 6.5 [PH]
PLATELET # BLD AUTO: 269 K/UL (ref 164–446)
PMV BLD AUTO: 11.7 FL (ref 9–12.9)
POTASSIUM SERPL-SCNC: 3.3 MMOL/L (ref 3.6–5.5)
PROT UR QL STRIP: NEGATIVE MG/DL
PROTHROMBIN TIME: 13 SEC (ref 12–14.6)
RBC # BLD AUTO: 4.87 M/UL (ref 4.2–5.4)
RBC UR QL AUTO: NEGATIVE
SODIUM SERPL-SCNC: 137 MMOL/L (ref 135–145)
SP GR UR STRIP.AUTO: 1.03
UROBILINOGEN UR STRIP.AUTO-MCNC: 0.2 MG/DL
WBC # BLD AUTO: 4.3 K/UL (ref 4.8–10.8)

## 2017-08-25 PROCEDURE — 85025 COMPLETE CBC W/AUTO DIFF WBC: CPT

## 2017-08-25 PROCEDURE — 82306 VITAMIN D 25 HYDROXY: CPT

## 2017-08-25 PROCEDURE — 71010 DX-CHEST-LIMITED (1 VIEW): CPT

## 2017-08-25 PROCEDURE — 36415 COLL VENOUS BLD VENIPUNCTURE: CPT

## 2017-08-25 PROCEDURE — 93010 ELECTROCARDIOGRAM REPORT: CPT | Performed by: INTERNAL MEDICINE

## 2017-08-25 PROCEDURE — 85730 THROMBOPLASTIN TIME PARTIAL: CPT

## 2017-08-25 PROCEDURE — 85610 PROTHROMBIN TIME: CPT

## 2017-08-25 PROCEDURE — 81003 URINALYSIS AUTO W/O SCOPE: CPT

## 2017-08-25 PROCEDURE — 80048 BASIC METABOLIC PNL TOTAL CA: CPT

## 2017-08-25 PROCEDURE — 93005 ELECTROCARDIOGRAM TRACING: CPT

## 2017-08-28 ENCOUNTER — OFFICE VISIT (OUTPATIENT)
Dept: ENDOCRINOLOGY | Facility: MEDICAL CENTER | Age: 72
End: 2017-08-28
Payer: MEDICARE

## 2017-08-28 VITALS
BODY MASS INDEX: 21.16 KG/M2 | SYSTOLIC BLOOD PRESSURE: 136 MMHG | HEART RATE: 91 BPM | HEIGHT: 65 IN | DIASTOLIC BLOOD PRESSURE: 78 MMHG | WEIGHT: 127 LBS | OXYGEN SATURATION: 96 %

## 2017-08-28 DIAGNOSIS — E11.69 TYPE 2 DIABETES MELLITUS WITH HYPERLIPIDEMIA (HCC): ICD-10-CM

## 2017-08-28 DIAGNOSIS — E78.5 TYPE 2 DIABETES MELLITUS WITH HYPERLIPIDEMIA (HCC): ICD-10-CM

## 2017-08-28 DIAGNOSIS — E11.00 UNCONTROLLED TYPE 2 DIABETES MELLITUS WITH HYPEROSMOLARITY WITHOUT COMA, WITHOUT LONG-TERM CURRENT USE OF INSULIN (HCC): ICD-10-CM

## 2017-08-28 PROCEDURE — 99214 OFFICE O/P EST MOD 30 MIN: CPT | Performed by: PHYSICIAN ASSISTANT

## 2017-08-28 RX ORDER — METFORMIN HYDROCHLORIDE 500 MG/1
500 TABLET, EXTENDED RELEASE ORAL 2 TIMES DAILY
Qty: 60 TAB | Refills: 6 | Status: SHIPPED | OUTPATIENT
Start: 2017-08-28 | End: 2017-08-29 | Stop reason: SDUPTHER

## 2017-08-28 NOTE — PATIENT INSTRUCTIONS
We can start her on   1.  Trulicity 0.75 once a week (Take on Monday)  2.  Metformin 500mg ER one in AM one in PM

## 2017-08-28 NOTE — PROGRESS NOTES
Return to office Patient Consult Note  Referred by: Parveen Barahona M.D.    Reason for consult: Diabetes Management Type 2    HPI:  Margy Boyer is a 72 y.o. old patient who is seeing us today for diabetes care.  This is a pleasant patient with diabetes and I appreciate the opportunity to participate in the care of this patient.    BG Diary:8/28/2017  In the AM: 154, 123, 51, 144, 161, 214, 238, 221, 188, 222, 183, 205  Before meal:  Before Bed:    BG Diary:8/7/2017  In the AM: 124, 127, 117, 118, 95, 123, 111, 113, 119, 103  Before meal: 122, 109, 108, 96, 112, 75, 113, 94   Before Bed: 105, 232, 122, 94, 112, 106, 122, 190, 102, 130       BG Diary:7/3/2017  In the AM: 138, 136, 130, 142, 132, 112, 157, 151, 145, 143, 137  Before meal:  Before Bed: 164, 213, 142, 128, 161, 188, 176, 144, 144, 192     BG Diary:5/30/2017  In the AM: 125, 136, 141, 127, 142, 144, 168, 129,  Before meal:  Before Bed: 200, 133, 200, 118, 355, 150      BG Diary:5/22/2017  In the AM:130, 134, 115, 104, 136, 133, 125  Before meal: 160, 112, 226, 153, 183, 172  Before Bed: 153, 92, 218, 127, 142, 198     BG Diary:5/15/2017  In the AM: forgot to bring     BG Diary:5/3/2017  In the AM: 140, 120, 106, 98, 104,    Before meal: 133, 95, 188, 134    Before Bed: 127, 113, 134, 105, 160      2/12/17 HbA1c 12.9,  GFR 59  8/7/17 HbA1c is 7.2    8/25/17 GFR 54     BG Diary:4/25/2017  In the AM: 135, 116, 123, 115, 142, 132, 124  Just before meal: 137, 216, 179, 59, 120, 138,    Just before Bed: 150, 280, 207, 218, 193, 174       Weight:She was 139 on 4/25/17 and today is 128      1. Uncontrolled type 2 diabetes mellitus with hyperosmolarity without coma, without long-term current use of insulin (CMS-HCC)  She was on  Kombiglyze XR 5/1000  Glipizide 5  I had her      2. Type 2 diabetes mellitus with hyperlipidemia (CMS-HCC)    She is now on:  1.  Synjardy 5/500 BID (stopped by PCP)  2.  Victoza 1.8 at night (Stopped by PCP)  She wants to do  Trulicity.        ROS:   Constitutional: No night sweats.  Eyes:  No visual changes.  Cardiac: No chest pain, No palpitations or racing heart rate.  Resp: No shortness of breath, No cough,   Gi: No Diarrhea    All other systems were reviewed and were/are negative.  The ROS was revised/revisited during this office visit from the patients first office visit with me on 4/25/17. Please review the full ROS during the first office visit.    Past Medical History:  Patient Active Problem List    Diagnosis Date Noted   • Diabetes mellitus type II, uncontrolled (CMS-HCC) 04/25/2017   • Type 2 diabetes mellitus with hyperlipidemia (CMS-HCC) 04/25/2017   • Chronic low back pain 09/29/2016   • DDD (degenerative disc disease), lumbar 09/29/2016   • Spondylosis of lumbosacral region without myelopathy or radiculopathy 09/29/2016   • Chronic neck pain 09/29/2016   • Osteoarthritis of spine with radiculopathy, cervical region 09/29/2016   • DDD (degenerative disc disease), cervical 09/29/2016   • Muscle weakness of left upper extremity 09/29/2016   • Cervical stenosis of spinal canal 09/29/2016   • Left hand pain 06/10/2015   • Contracture of joint of hand 01/05/2015   • Fracture of metacarpal bone 12/23/2013       Past Surgical History:  Past Surgical History:   Procedure Laterality Date   • GYN SURGERY      tubal ligation   • OTHER ORTHOPEDIC SURGERY  ?2012    Right knee   • OTHER ORTHOPEDIC SURGERY  2012/1974    bilat bunion removal       Allergies:  Asa [aspirin]; Celecoxib; Codeine; Eggs; Pcn [penicillins]; Soap; Tetracycline; and Vioxx    Social History:  Social History     Social History   • Marital status: Single     Spouse name: N/A   • Number of children: N/A   • Years of education: N/A     Occupational History   • Not on file.     Social History Main Topics   • Smoking status: Never Smoker   • Smokeless tobacco: Never Used   • Alcohol use No   • Drug use: No   • Sexual activity: Not on file     Other Topics Concern   •  "Not on file     Social History Narrative   • No narrative on file       Family History:  History reviewed. No pertinent family history.    Medications:    Current Outpatient Prescriptions:   •  losartan-hydrochlorothiazide (HYZAAR) 50-12.5 MG per tablet, Take 1 Tab by mouth every day., Disp: 30 Tab, Rfl: 11  •  lovastatin (MEVACOR) 40 MG tablet, Take 40 mg by mouth every evening., Disp: , Rfl:   •  pantoprazole (PROTONIX) 40 MG Tablet Delayed Response, Take 40 mg by mouth every day., Disp: , Rfl:   •  albuterol (VENTOLIN OR PROVENTIL) 108 (90 BASE) MCG/ACT Aero Soln inhalation aerosol, Inhale 2 Puffs by mouth every 6 hours as needed for Shortness of Breath., Disp: 8.5 g, Rfl: 3        Physical Examination:   Vital signs: /78   Pulse 91   Ht 1.651 m (5' 5\")   Wt 57.6 kg (127 lb)   SpO2 96%   BMI 21.13 kg/m²   General: No distress, cooperative, well dressed and well nourished.   Eyes: No scleral icterus or discharge, No hyposphagma  ENMT: Normal on external inspection of nose, lips, No nasal drainage   Neck: No abnormal masses on inspection  Resp: Normal effort, Bilateral clear to auscultation, No wheezing, No rales  CVS: Regular rate and rhythm, S1 S2 normal, No murmur. No gallop  Extremities: No edema bilateral extremities  Neuro: Alert and oriented  Skin: No rash, No Ulcers  Psych: Normal mood and affect      Assessment and Plan:    1. Uncontrolled type 2 diabetes mellitus with hyperosmolarity without coma, without long-term current use of insulin (CMS-[facility])    She is now on:  1.  Synjardy 5/500 BID (stopped by PCP)  2.  Victoza 1.8 at night (Stopped by PCP)  She wants to do Trulicity.      We can start her on   1.  Trulicity 0.75 once a week (Take on Monday)  2.  Metformin 500mg ER one in AM one in PM    I am not convinced that the SGLT2 after 4 months of being on this med all of a sudden caused dehydration because she did wonderfully on it for 4 months.  I will stop it for now and she wants to try a " once weekly over daily GLP-1    2. Type 2 diabetes mellitus with hyperlipidemia (CMS-[facility])  The total time spent seeing this patient today face to face in consultation, and formulating an action plan for this visit was greater than 25 minutes. > Than 50% of this time was spent counseling, discussing problems documented above and below, coordinating care and answering questions by the physician assistant.  We developed a diabetes care plan for this patient today.    Return in about 2 weeks (around 9/11/2017).    Blood glucose log: Check BG in the morning when wake up, before lunch or dinner and before bed.  So three times a day.  Always bring BG diary to the next office visit.       Thank you kindly for allowing me to participate in the diabetes care plan for this patient.    Martin Leblanc PA-C, BC-ADM  Board Certified - Advanced Diabetes Management  08/28/17    CC:   Parveen Barahona M.D.

## 2017-08-29 RX ORDER — METFORMIN HYDROCHLORIDE 500 MG/1
1000 TABLET, EXTENDED RELEASE ORAL 2 TIMES DAILY
Qty: 120 TAB | Refills: 6 | Status: ON HOLD | OUTPATIENT
Start: 2017-08-29 | End: 2017-11-08

## 2017-09-01 ENCOUNTER — TELEPHONE (OUTPATIENT)
Dept: ENDOCRINOLOGY | Facility: MEDICAL CENTER | Age: 72
End: 2017-09-01

## 2017-09-01 NOTE — TELEPHONE ENCOUNTER
Patient called to say she is having stomach issues and is wondering if the Metformin could be causing the issues?

## 2017-09-05 ENCOUNTER — TELEPHONE (OUTPATIENT)
Dept: ENDOCRINOLOGY | Facility: MEDICAL CENTER | Age: 72
End: 2017-09-05

## 2017-09-05 NOTE — TELEPHONE ENCOUNTER
Pt called states medication is making her stomach upset. Asking if she can get some other form of metformin.

## 2017-09-05 NOTE — TELEPHONE ENCOUNTER
I called the patient on 9/5/17 she feels not well when taking metformin one 500mg ER a day.  I asked her to take it once in the AM with food.  If she still does not feel well then she can stop the Metfomrin. I talked with her

## 2017-09-11 ENCOUNTER — OFFICE VISIT (OUTPATIENT)
Dept: ENDOCRINOLOGY | Facility: MEDICAL CENTER | Age: 72
End: 2017-09-11
Payer: MEDICARE

## 2017-09-11 VITALS
BODY MASS INDEX: 21.49 KG/M2 | HEIGHT: 65 IN | OXYGEN SATURATION: 99 % | SYSTOLIC BLOOD PRESSURE: 132 MMHG | HEART RATE: 80 BPM | WEIGHT: 129 LBS | DIASTOLIC BLOOD PRESSURE: 74 MMHG

## 2017-09-11 DIAGNOSIS — E78.5 TYPE 2 DIABETES MELLITUS WITH HYPERLIPIDEMIA (HCC): ICD-10-CM

## 2017-09-11 DIAGNOSIS — E11.00 UNCONTROLLED TYPE 2 DIABETES MELLITUS WITH HYPEROSMOLARITY WITHOUT COMA, UNSPECIFIED LONG TERM INSULIN USE STATUS: ICD-10-CM

## 2017-09-11 DIAGNOSIS — E11.69 TYPE 2 DIABETES MELLITUS WITH HYPERLIPIDEMIA (HCC): ICD-10-CM

## 2017-09-11 PROCEDURE — 99214 OFFICE O/P EST MOD 30 MIN: CPT | Performed by: PHYSICIAN ASSISTANT

## 2017-09-11 NOTE — PATIENT INSTRUCTIONS
1. Type 2 diabetes mellitus with hyperlipidemia (CMS-[facility])  She  is now on:  1.  Trulicity 0.75 once a week (Take on Monday) (INCREASE to 1.5)  2.  Metformin 500mg ER one in AM

## 2017-09-11 NOTE — PROGRESS NOTES
Return to office Patient Consult Note  Referred by: Parveen Barahona M.D.    Reason for consult: Diabetes Management Type 2    HPI:  Margy Boyer is a 72 y.o. old patient who is seeing us today for diabetes care.  This is a pleasant patient with diabetes and I appreciate the opportunity to participate in the care of this patient.    BG Diary:9/11/2017  In the AM: 145, 146, 44, 46, 153, 167, 171  Before meal:  Before Bed:    BG Diary:8/28/2017  In the AM: 154, 123, 51, 144, 161, 214, 238, 221, 188, 222, 183, 205  Before meal:  Before Bed:     BG Diary:8/7/2017  In the AM: 124, 127, 117, 118, 95, 123, 111, 113, 119, 103  Before meal: 122, 109, 108, 96, 112, 75, 113, 94   Before Bed: 105, 232, 122, 94, 112, 106, 122, 190, 102, 130       BG Diary:7/3/2017  In the AM: 138, 136, 130, 142, 132, 112, 157, 151, 145, 143, 137  Before meal:  Before Bed: 164, 213, 142, 128, 161, 188, 176, 144, 144, 192     BG Diary:5/30/2017  In the AM: 125, 136, 141, 127, 142, 144, 168, 129,  Before meal:  Before Bed: 200, 133, 200, 118, 355, 150      BG Diary:5/22/2017  In the AM:130, 134, 115, 104, 136, 133, 125  Before meal: 160, 112, 226, 153, 183, 172  Before Bed: 153, 92, 218, 127, 142, 198     BG Diary:5/15/2017  In the AM: forgot to bring     BG Diary:5/3/2017  In the AM: 140, 120, 106, 98, 104,    Before meal: 133, 95, 188, 134    Before Bed: 127, 113, 134, 105, 160      2/12/17 HbA1c 12.9,  GFR 59  8/7/17 HbA1c is 7.2     8/25/17 GFR 54     BG Diary:4/25/2017  In the AM: 135, 116, 123, 115, 142, 132, 124  Just before meal: 137, 216, 179, 59, 120, 138,    Just before Bed: 150, 280, 207, 218, 193, 174       Weight:She was 139 on 4/25/17 and today is 128      1. Type 2 diabetes mellitus with hyperlipidemia (CMS-HCC)  She was on  Kombiglyze XR 5/1000  Glipizide 5  I had her       2. Type 2 diabetes mellitus with hyperlipidemia (CMS-HCC)     She  is now on:  1.  Trulicity 0.75 once a week (Take on Monday)  2.  Metformin 500mg ER one  in AM one in PM       2. Uncontrolled type 2 diabetes mellitus with hyperosmolarity without coma, unspecified long term insulin use status (CMS-HCC)          ROS:   Constitutional: No night sweats.  Eyes:  No visual changes.  Cardiac: No chest pain, No palpitations or racing heart rate.  Resp: No shortness of breath, No cough,   Gi: No Diarrhea    All other systems were reviewed and were/are negative.  The ROS was revised/revisited during this office visit from the patients first office visit with me on 4/25/17 Please review the full ROS during the first office visit.    Past Medical History:  Patient Active Problem List    Diagnosis Date Noted   • Diabetes mellitus type II, uncontrolled (CMS-HCC) 04/25/2017   • Type 2 diabetes mellitus with hyperlipidemia (CMS-HCC) 04/25/2017   • Chronic low back pain 09/29/2016   • DDD (degenerative disc disease), lumbar 09/29/2016   • Spondylosis of lumbosacral region without myelopathy or radiculopathy 09/29/2016   • Chronic neck pain 09/29/2016   • Osteoarthritis of spine with radiculopathy, cervical region 09/29/2016   • DDD (degenerative disc disease), cervical 09/29/2016   • Muscle weakness of left upper extremity 09/29/2016   • Cervical stenosis of spinal canal 09/29/2016   • Left hand pain 06/10/2015   • Contracture of joint of hand 01/05/2015   • Fracture of metacarpal bone 12/23/2013       Past Surgical History:  Past Surgical History:   Procedure Laterality Date   • GYN SURGERY      tubal ligation   • OTHER ORTHOPEDIC SURGERY  ?2012    Right knee   • OTHER ORTHOPEDIC SURGERY  2012/1974    bilat bunion removal       Allergies:  Asa [aspirin]; Celecoxib; Codeine; Eggs; Pcn [penicillins]; Soap; Tetracycline; and Vioxx    Social History:  Social History     Social History   • Marital status: Single     Spouse name: N/A   • Number of children: N/A   • Years of education: N/A     Occupational History   • Not on file.     Social History Main Topics   • Smoking status: Never  "Smoker   • Smokeless tobacco: Never Used   • Alcohol use No   • Drug use: No   • Sexual activity: Not on file     Other Topics Concern   • Not on file     Social History Narrative   • No narrative on file       Family History:  History reviewed. No pertinent family history.    Medications:    Current Outpatient Prescriptions:   •  metformin ER (GLUCOPHAGE XR) 500 MG TABLET SR 24 HR, Take 2 Tabs by mouth 2 times a day. Please give Generic XR Metformin, Disp: 120 Tab, Rfl: 6  •  Dulaglutide (TRULICITY) 0.75 MG/0.5ML Solution Pen-injector, Inject 1 PEN as instructed every 7 days., Disp: 4 PEN, Rfl: 6  •  losartan-hydrochlorothiazide (HYZAAR) 50-12.5 MG per tablet, Take 1 Tab by mouth every day., Disp: 30 Tab, Rfl: 11  •  lovastatin (MEVACOR) 40 MG tablet, Take 40 mg by mouth every evening., Disp: , Rfl:   •  pantoprazole (PROTONIX) 40 MG Tablet Delayed Response, Take 40 mg by mouth every day., Disp: , Rfl:   •  albuterol (VENTOLIN OR PROVENTIL) 108 (90 BASE) MCG/ACT Aero Soln inhalation aerosol, Inhale 2 Puffs by mouth every 6 hours as needed for Shortness of Breath., Disp: 8.5 g, Rfl: 3        Physical Examination:   Vital signs: /74   Pulse 80   Ht 1.651 m (5' 5\")   Wt 58.5 kg (129 lb)   SpO2 99%   BMI 21.47 kg/m²   General: No distress, cooperative, well dressed and well nourished.   Eyes: No scleral icterus or discharge, No hyposphagma  ENMT: Normal on external inspection of nose, lips, No nasal drainage   Neck: No abnormal masses on inspection  Resp: Normal effort, Bilateral clear to auscultation, No wheezing, No rales  CVS: Regular rate and rhythm, S1 S2 normal, No murmur. No gallop  Extremities: No edema bilateral extremities  Neuro: Alert and oriented  Skin: No rash, No Ulcers  Psych: Normal mood and affect      Assessment and Plan:    1. Type 2 diabetes mellitus with hyperlipidemia (CMS-HCC)  She  is now on:  1.  Trulicity 0.75 once a week (Take on Monday) (INCREASE to 1.5)  2.  Metformin 500mg ER " one in AM     2. Uncontrolled type 2 diabetes mellitus with hyperosmolarity without coma, unspecified long term insulin use status (CMS-HCC)  The total time spent seeing this patient today face to face in consultation, and formulating an action plan for this visit was greater than 25 minutes. > Than 50% of this time was spent counseling, discussing problems documented above and below, coordinating care and answering questions by the physician assistant.  We developed a diabetes care plan for this patient today.        Return in about 2 weeks (around 9/25/2017).    Blood glucose log: Check BG in the morning when wake up, before lunch or dinner and before bed.  So three times a day.  Always bring BG diary to the next office visit.     Thank you kindly for allowing me to participate in the diabetes care plan for this patient.    Martin Leblanc PA-C, BC-[facility]  Board Certified - Advanced Diabetes Management  09/11/17    CC:   Parveen Barahona M.D.

## 2017-09-12 ENCOUNTER — TELEPHONE (OUTPATIENT)
Dept: ENDOCRINOLOGY | Facility: MEDICAL CENTER | Age: 72
End: 2017-09-12

## 2017-09-12 NOTE — TELEPHONE ENCOUNTER
Nilsa from dr amin's office called asking if pt is still not cleared to get procedure done. Pt told them she was seen yesterday, but was not sure if she was cleared. Please advise.

## 2017-09-25 ENCOUNTER — OFFICE VISIT (OUTPATIENT)
Dept: ENDOCRINOLOGY | Facility: MEDICAL CENTER | Age: 72
End: 2017-09-25
Payer: MEDICARE

## 2017-09-25 VITALS
SYSTOLIC BLOOD PRESSURE: 120 MMHG | HEIGHT: 65 IN | OXYGEN SATURATION: 91 % | WEIGHT: 125.6 LBS | HEART RATE: 113 BPM | DIASTOLIC BLOOD PRESSURE: 70 MMHG | BODY MASS INDEX: 20.93 KG/M2

## 2017-09-25 DIAGNOSIS — E78.5 TYPE 2 DIABETES MELLITUS WITH HYPERLIPIDEMIA (HCC): ICD-10-CM

## 2017-09-25 DIAGNOSIS — E11.69 TYPE 2 DIABETES MELLITUS WITH HYPERLIPIDEMIA (HCC): ICD-10-CM

## 2017-09-25 PROCEDURE — 99214 OFFICE O/P EST MOD 30 MIN: CPT | Performed by: PHYSICIAN ASSISTANT

## 2017-09-25 NOTE — LETTER
September 25, 2017        Margy Boyer  [address]  [place] [address]        To Whom it may concern:    Margy is cleared today on 9/25/17 for surgery.  Her blood glucose numbers range from 96 to 149 and her most recent HbA1c is 7.2.  She is very stable just on   1.  Trulicity 1.5 once a week (Take's on Monday's)  2.  Metformin 500mg ER one in AM one in PM    If you have any questions or concerns, please don't hesitate to call.        Sincerely,        Yoshi Leblanc P.A.-C. BC-[facility]  Board Certified- Advanced Diabetes Management    Electronically Signed

## 2017-09-25 NOTE — PROGRESS NOTES
Return to office Patient Consult Note  Referred by: Parveen Barahona M.D.    Reason for consult: Diabetes Management Type 2    HPI:  Margy Boyer is a 72 y.o. old patient who is seeing us today for diabetes care.  This is a pleasant patient with diabetes and I appreciate the opportunity to participate in the care of this patient.    BG Diary:9/25/2017  In the AM: 128, 126, 96, 99,  Before meal: 102, 144, 149, 104  Before Bed: 131, 143, 143, 129     BG Diary:9/11/2017  In the AM: 145, 146, 44, 46, 153, 167, 171    BG Diary:8/7/2017  In the AM: 124, 127, 117, 118, 95, 123, 111, 113, 119, 103  Before meal: 122, 109, 108, 96, 112, 75, 113, 94   Before Bed: 105, 232, 122, 94, 112, 106, 122, 190, 102, 130       BG Diary:7/3/2017  In the AM: 138, 136, 130, 142, 132, 112, 157, 151, 145, 143, 137  Before meal:  Before Bed: 164, 213, 142, 128, 161, 188, 176, 144, 144, 192    BG Diary:5/3/2017  In the AM: 140, 120, 106, 98, 104,    Before meal: 133, 95, 188, 134    Before Bed: 127, 113, 134, 105, 160      2/12/17 HbA1c 12.9,  GFR 59  8/7/17 HbA1c is 7.2     8/25/17 GFR 54     BG Diary:4/25/2017  In the AM: 135, 116, 123, 115, 142, 132, 124  Just before meal: 137, 216, 179, 59, 120, 138,    Just before Bed: 150, 280, 207, 218, 193, 174       Weight:She was 139 on 4/25/17 and today is 125      1. Type 2 diabetes mellitus with hyperlipidemia (CMS-[facility])    She  is now on:  1.  Trulicity 1.5 once a week (Take on Monday's)  2.  Metformin 500mg ER one in AM one in PM    She is doing very well on this combination.  No nausea or Diarrhea.  We tried several medications before this combo for her.     ROS:   Constitutional: No night sweats.  Eyes:  No visual changes.  Cardiac: No chest pain, No palpitations or racing heart rate.  Resp: No shortness of breath, No cough,   Gi: No Diarrhea      All other systems were reviewed and were/are negative.  The ROS was revised/revisited during this office visit from the patients first office  visit with me on 4/25/17 Please review the full ROS during the first office visit.    Past Medical History:  Patient Active Problem List    Diagnosis Date Noted   • Diabetes mellitus type II, uncontrolled (CMS-HCC) 04/25/2017   • Type 2 diabetes mellitus with hyperlipidemia (CMS-HCC) 04/25/2017   • Chronic low back pain 09/29/2016   • DDD (degenerative disc disease), lumbar 09/29/2016   • Spondylosis of lumbosacral region without myelopathy or radiculopathy 09/29/2016   • Chronic neck pain 09/29/2016   • Osteoarthritis of spine with radiculopathy, cervical region 09/29/2016   • DDD (degenerative disc disease), cervical 09/29/2016   • Muscle weakness of left upper extremity 09/29/2016   • Cervical stenosis of spinal canal 09/29/2016   • Left hand pain 06/10/2015   • Contracture of joint of hand 01/05/2015   • Fracture of metacarpal bone 12/23/2013       Past Surgical History:  Past Surgical History:   Procedure Laterality Date   • GYN SURGERY      tubal ligation   • OTHER ORTHOPEDIC SURGERY  ?2012    Right knee   • OTHER ORTHOPEDIC SURGERY  2012/1974    bilat bunion removal       Allergies:  Asa [aspirin]; Celecoxib; Codeine; Eggs; Pcn [penicillins]; Soap; Tetracycline; and Vioxx    Social History:  Social History     Social History   • Marital status: Single     Spouse name: N/A   • Number of children: N/A   • Years of education: N/A     Occupational History   • Not on file.     Social History Main Topics   • Smoking status: Never Smoker   • Smokeless tobacco: Never Used   • Alcohol use No   • Drug use: No   • Sexual activity: Not on file     Other Topics Concern   • Not on file     Social History Narrative   • No narrative on file       Family History:  History reviewed. No pertinent family history.    Medications:    Current Outpatient Prescriptions:   •  Dulaglutide (TRULICITY) 1.5 MG/0.5ML Solution Pen-injector, Inject 0.5 mL as instructed every 7 days., Disp: 4 PEN, Rfl: 6  •  metformin ER (GLUCOPHAGE XR) 500  "MG TABLET SR 24 HR, Take 2 Tabs by mouth 2 times a day. Please give Generic XR Metformin, Disp: 120 Tab, Rfl: 6  •  losartan-hydrochlorothiazide (HYZAAR) 50-12.5 MG per tablet, Take 1 Tab by mouth every day., Disp: 30 Tab, Rfl: 11  •  lovastatin (MEVACOR) 40 MG tablet, Take 40 mg by mouth every evening., Disp: , Rfl:   •  pantoprazole (PROTONIX) 40 MG Tablet Delayed Response, Take 40 mg by mouth every day., Disp: , Rfl:   •  albuterol (VENTOLIN OR PROVENTIL) 108 (90 BASE) MCG/ACT Aero Soln inhalation aerosol, Inhale 2 Puffs by mouth every 6 hours as needed for Shortness of Breath., Disp: 8.5 g, Rfl: 3        Physical Examination:   Vital signs: /70   Pulse (!) 113   Ht 1.651 m (5' 5\")   Wt 57 kg (125 lb 9.6 oz)   SpO2 91%   BMI 20.90 kg/m²   General: No distress, cooperative, well dressed and well nourished.   Eyes: No scleral icterus or discharge, No hyposphagma  ENMT: Normal on external inspection of nose, lips, No nasal drainage   Neck: No abnormal masses on inspection  Resp: Normal effort, Bilateral clear to auscultation, No wheezing, No rales  CVS: Regular rate and rhythm, S1 S2 normal, No murmur. No gallop  Extremities: No edema bilateral extremities  Neuro: Alert and oriented  Skin: No rash, No Ulcers  Psych: Normal mood and affect      Assessment and Plan:    1. Type 2 diabetes mellitus with hyperlipidemia (CMS-HCC)    She  is now on:  1.  Trulicity 1.5 once a week (Take on Monday's)  2.  Metformin 500mg ER one in AM one in PM    She is doing very well on this combination.  No nausea or Diarrhea.  We tried several medications before this combo for her.   She is doing so much better from the first I saw her with a HbA1c of 12.9 now at 7.2    Surgery cleared letter written and faxed to Rubi Ferreira office today.       Return in about 3 months (around 12/25/2017).    Blood glucose log: Check BG in the morning when wake up, before lunch or dinner and before bed.  So three times a day.  Always bring BG " diary to the next office visit.     Thank you kindly for allowing me to participate in the diabetes care plan for this patient.    Martin Leblanc PA-C, BC-[facility]  Board Certified - Advanced Diabetes Management  09/25/17    CC:   Parveen Barahona M.D.

## 2017-10-23 DIAGNOSIS — Z01.812 PRE-OPERATIVE LABORATORY EXAMINATION: ICD-10-CM

## 2017-10-23 LAB
25(OH)D3 SERPL-MCNC: 29 NG/ML (ref 30–100)
ANION GAP SERPL CALC-SCNC: 9 MMOL/L (ref 0–11.9)
APPEARANCE UR: CLEAR
APTT PPP: 23.5 SEC (ref 24.7–36)
BASOPHILS # BLD AUTO: 1 % (ref 0–1.8)
BASOPHILS # BLD: 0.04 K/UL (ref 0–0.12)
BILIRUB UR QL STRIP.AUTO: NEGATIVE
BUN SERPL-MCNC: 14 MG/DL (ref 8–22)
CALCIUM SERPL-MCNC: 10.4 MG/DL (ref 8.5–10.5)
CHLORIDE SERPL-SCNC: 99 MMOL/L (ref 96–112)
CO2 SERPL-SCNC: 28 MMOL/L (ref 20–33)
COLOR UR: YELLOW
CREAT SERPL-MCNC: 1 MG/DL (ref 0.5–1.4)
CULTURE IF INDICATED INDCX: NO UA CULTURE
EOSINOPHIL # BLD AUTO: 0.06 K/UL (ref 0–0.51)
EOSINOPHIL NFR BLD: 1.5 % (ref 0–6.9)
ERYTHROCYTE [DISTWIDTH] IN BLOOD BY AUTOMATED COUNT: 41.1 FL (ref 35.9–50)
GFR SERPL CREATININE-BSD FRML MDRD: 55 ML/MIN/1.73 M 2
GLUCOSE SERPL-MCNC: 102 MG/DL (ref 65–99)
GLUCOSE UR STRIP.AUTO-MCNC: NEGATIVE MG/DL
HCT VFR BLD AUTO: 46 % (ref 37–47)
HGB BLD-MCNC: 15.5 G/DL (ref 12–16)
IMM GRANULOCYTES # BLD AUTO: 0.01 K/UL (ref 0–0.11)
IMM GRANULOCYTES NFR BLD AUTO: 0.2 % (ref 0–0.9)
INR PPP: 0.94 (ref 0.87–1.13)
KETONES UR STRIP.AUTO-MCNC: NEGATIVE MG/DL
LEUKOCYTE ESTERASE UR QL STRIP.AUTO: NEGATIVE
LYMPHOCYTES # BLD AUTO: 2.19 K/UL (ref 1–4.8)
LYMPHOCYTES NFR BLD: 54.5 % (ref 22–41)
MCH RBC QN AUTO: 30.7 PG (ref 27–33)
MCHC RBC AUTO-ENTMCNC: 33.7 G/DL (ref 33.6–35)
MCV RBC AUTO: 91.1 FL (ref 81.4–97.8)
MICRO URNS: NORMAL
MONOCYTES # BLD AUTO: 0.26 K/UL (ref 0–0.85)
MONOCYTES NFR BLD AUTO: 6.5 % (ref 0–13.4)
NEUTROPHILS # BLD AUTO: 1.46 K/UL (ref 2–7.15)
NEUTROPHILS NFR BLD: 36.3 % (ref 44–72)
NITRITE UR QL STRIP.AUTO: NEGATIVE
NRBC # BLD AUTO: 0 K/UL
NRBC BLD AUTO-RTO: 0 /100 WBC
PH UR STRIP.AUTO: 7 [PH]
PLATELET # BLD AUTO: 371 K/UL (ref 164–446)
PMV BLD AUTO: 11.2 FL (ref 9–12.9)
POTASSIUM SERPL-SCNC: 3.6 MMOL/L (ref 3.6–5.5)
PROT UR QL STRIP: NEGATIVE MG/DL
PROTHROMBIN TIME: 12.9 SEC (ref 12–14.6)
RBC # BLD AUTO: 5.05 M/UL (ref 4.2–5.4)
RBC UR QL AUTO: NEGATIVE
SODIUM SERPL-SCNC: 136 MMOL/L (ref 135–145)
SP GR UR STRIP.AUTO: 1.02
UROBILINOGEN UR STRIP.AUTO-MCNC: 0.2 MG/DL
WBC # BLD AUTO: 4 K/UL (ref 4.8–10.8)

## 2017-10-23 PROCEDURE — 85025 COMPLETE CBC W/AUTO DIFF WBC: CPT

## 2017-10-23 PROCEDURE — 81003 URINALYSIS AUTO W/O SCOPE: CPT

## 2017-10-23 PROCEDURE — 85610 PROTHROMBIN TIME: CPT

## 2017-10-23 PROCEDURE — 85730 THROMBOPLASTIN TIME PARTIAL: CPT

## 2017-10-23 PROCEDURE — 82306 VITAMIN D 25 HYDROXY: CPT

## 2017-10-23 PROCEDURE — 36415 COLL VENOUS BLD VENIPUNCTURE: CPT

## 2017-10-23 PROCEDURE — 80048 BASIC METABOLIC PNL TOTAL CA: CPT

## 2017-10-23 NOTE — DISCHARGE PLANNING
Patient was in for her pre-admission testing. She will have a multi-level cervical fusion 11/8/17. Ins- [facility]/Medicaid FFS. She lives alone in [place] and has no one to help her after her surgery. She stated to the nurse that she would like to go to a SNF in [place] for Rehab after surgery. This CM was unable to catch patient before she left to discuss this further.

## 2017-11-08 ENCOUNTER — HOSPITAL ENCOUNTER (INPATIENT)
Facility: MEDICAL CENTER | Age: 72
LOS: 5 days | DRG: 472 | End: 2017-11-13
Attending: NEUROLOGICAL SURGERY | Admitting: NEUROLOGICAL SURGERY
Payer: MEDICARE

## 2017-11-08 ENCOUNTER — APPOINTMENT (OUTPATIENT)
Dept: RADIOLOGY | Facility: MEDICAL CENTER | Age: 72
DRG: 472 | End: 2017-11-08
Attending: NEUROLOGICAL SURGERY
Payer: MEDICARE

## 2017-11-08 DIAGNOSIS — M47.22 OSTEOARTHRITIS OF SPINE WITH RADICULOPATHY, CERVICAL REGION: ICD-10-CM

## 2017-11-08 DIAGNOSIS — M48.02 CERVICAL STENOSIS OF SPINAL CANAL: ICD-10-CM

## 2017-11-08 LAB
ABO GROUP BLD: NORMAL
ABO GROUP BLD: NORMAL
BLD GP AB SCN SERPL QL: NORMAL
GLUCOSE BLD-MCNC: 137 MG/DL (ref 65–99)
GLUCOSE BLD-MCNC: 98 MG/DL (ref 65–99)
RH BLD: NORMAL

## 2017-11-08 PROCEDURE — 500331 HCHG COTTONOID, SURG PATTIE: Performed by: NEUROLOGICAL SURGERY

## 2017-11-08 PROCEDURE — 700111 HCHG RX REV CODE 636 W/ 250 OVERRIDE (IP): Performed by: NEUROLOGICAL SURGERY

## 2017-11-08 PROCEDURE — 700111 HCHG RX REV CODE 636 W/ 250 OVERRIDE (IP)

## 2017-11-08 PROCEDURE — 700101 HCHG RX REV CODE 250: Performed by: PHYSICIAN ASSISTANT

## 2017-11-08 PROCEDURE — 86900 BLOOD TYPING SEROLOGIC ABO: CPT

## 2017-11-08 PROCEDURE — 502000 HCHG MISC OR IMPLANTS RC 0278: Performed by: NEUROLOGICAL SURGERY

## 2017-11-08 PROCEDURE — C1713 ANCHOR/SCREW BN/BN,TIS/BN: HCPCS | Performed by: NEUROLOGICAL SURGERY

## 2017-11-08 PROCEDURE — 95940 IONM IN OPERATNG ROOM 15 MIN: CPT | Performed by: NEUROLOGICAL SURGERY

## 2017-11-08 PROCEDURE — 160041 HCHG SURGERY MINUTES - EA ADDL 1 MIN LEVEL 4: Performed by: NEUROLOGICAL SURGERY

## 2017-11-08 PROCEDURE — 503195 HCHG SEALER, BIPOLAR AQUAMANTYS: Performed by: NEUROLOGICAL SURGERY

## 2017-11-08 PROCEDURE — 770001 HCHG ROOM/CARE - MED/SURG/GYN PRIV*

## 2017-11-08 PROCEDURE — 00NW0ZZ RELEASE CERVICAL SPINAL CORD, OPEN APPROACH: ICD-10-PCS | Performed by: NEUROLOGICAL SURGERY

## 2017-11-08 PROCEDURE — 700102 HCHG RX REV CODE 250 W/ 637 OVERRIDE(OP): Performed by: PHYSICIAN ASSISTANT

## 2017-11-08 PROCEDURE — 160036 HCHG PACU - EA ADDL 30 MINS PHASE I: Performed by: NEUROLOGICAL SURGERY

## 2017-11-08 PROCEDURE — A9270 NON-COVERED ITEM OR SERVICE: HCPCS | Performed by: PHYSICIAN ASSISTANT

## 2017-11-08 PROCEDURE — 502240 HCHG MISC OR SUPPLY RC 0272: Performed by: NEUROLOGICAL SURGERY

## 2017-11-08 PROCEDURE — 500075 HCHG BLADE, CLIPPER NEURO: Performed by: NEUROLOGICAL SURGERY

## 2017-11-08 PROCEDURE — 700111 HCHG RX REV CODE 636 W/ 250 OVERRIDE (IP): Performed by: PHYSICIAN ASSISTANT

## 2017-11-08 PROCEDURE — 160002 HCHG RECOVERY MINUTES (STAT): Performed by: NEUROLOGICAL SURGERY

## 2017-11-08 PROCEDURE — 502626 HCHG SURGIFLO HEMOSTATIC MATRIX 6ML: Performed by: NEUROLOGICAL SURGERY

## 2017-11-08 PROCEDURE — 36415 COLL VENOUS BLD VENIPUNCTURE: CPT

## 2017-11-08 PROCEDURE — 00HU03Z INSERTION OF INFUSION DEVICE INTO SPINAL CANAL, OPEN APPROACH: ICD-10-PCS | Performed by: NEUROLOGICAL SURGERY

## 2017-11-08 PROCEDURE — 501838 HCHG SUTURE GENERAL: Performed by: NEUROLOGICAL SURGERY

## 2017-11-08 PROCEDURE — A6222 GAUZE <=16 IN NO W/SAL W/O B: HCPCS | Performed by: NEUROLOGICAL SURGERY

## 2017-11-08 PROCEDURE — 86901 BLOOD TYPING SEROLOGIC RH(D): CPT

## 2017-11-08 PROCEDURE — 501401 HCHG SPHERE, REFLECTIVE MARKER: Performed by: NEUROLOGICAL SURGERY

## 2017-11-08 PROCEDURE — 500819 HCHG MARKERS, PASSIVE REFLECTIVE: Performed by: NEUROLOGICAL SURGERY

## 2017-11-08 PROCEDURE — A9270 NON-COVERED ITEM OR SERVICE: HCPCS

## 2017-11-08 PROCEDURE — 4A11X4G MONITORING OF PERIPHERAL NERVOUS ELECTRICAL ACTIVITY, INTRAOPERATIVE, EXTERNAL APPROACH: ICD-10-PCS | Performed by: NEUROLOGICAL SURGERY

## 2017-11-08 PROCEDURE — 160029 HCHG SURGERY MINUTES - 1ST 30 MINS LEVEL 4: Performed by: NEUROLOGICAL SURGERY

## 2017-11-08 PROCEDURE — 72040 X-RAY EXAM NECK SPINE 2-3 VW: CPT

## 2017-11-08 PROCEDURE — 700102 HCHG RX REV CODE 250 W/ 637 OVERRIDE(OP)

## 2017-11-08 PROCEDURE — 0RG4071 FUSION OF CERVICOTHORACIC VERTEBRAL JOINT WITH AUTOLOGOUS TISSUE SUBSTITUTE, POSTERIOR APPROACH, POSTERIOR COLUMN, OPEN APPROACH: ICD-10-PCS | Performed by: NEUROLOGICAL SURGERY

## 2017-11-08 PROCEDURE — 502590 HCHG PROBE, PRASS STANDARD: Performed by: NEUROLOGICAL SURGERY

## 2017-11-08 PROCEDURE — 160035 HCHG PACU - 1ST 60 MINS PHASE I: Performed by: NEUROLOGICAL SURGERY

## 2017-11-08 PROCEDURE — A4306 DRUG DELIVERY SYSTEM <=50 ML: HCPCS | Performed by: NEUROLOGICAL SURGERY

## 2017-11-08 PROCEDURE — 01N10ZZ RELEASE CERVICAL NERVE, OPEN APPROACH: ICD-10-PCS | Performed by: NEUROLOGICAL SURGERY

## 2017-11-08 PROCEDURE — 500002 HCHG ADHESIVE, DERMABOND: Performed by: NEUROLOGICAL SURGERY

## 2017-11-08 PROCEDURE — 501317 HCHG SCREW, VERTEX SET: Performed by: NEUROLOGICAL SURGERY

## 2017-11-08 PROCEDURE — 82962 GLUCOSE BLOOD TEST: CPT

## 2017-11-08 PROCEDURE — 160009 HCHG ANES TIME/MIN: Performed by: NEUROLOGICAL SURGERY

## 2017-11-08 PROCEDURE — 502708 HCHG CATHETER, ON-Q SILVER SKR: Performed by: NEUROLOGICAL SURGERY

## 2017-11-08 PROCEDURE — 86850 RBC ANTIBODY SCREEN: CPT

## 2017-11-08 PROCEDURE — 500367 HCHG DRAIN KIT, HEMOVAC: Performed by: NEUROLOGICAL SURGERY

## 2017-11-08 PROCEDURE — 700101 HCHG RX REV CODE 250

## 2017-11-08 PROCEDURE — 0RG2071 FUSION OF 2 OR MORE CERVICAL VERTEBRAL JOINTS WITH AUTOLOGOUS TISSUE SUBSTITUTE, POSTERIOR APPROACH, POSTERIOR COLUMN, OPEN APPROACH: ICD-10-PCS | Performed by: NEUROLOGICAL SURGERY

## 2017-11-08 PROCEDURE — 502633 HCHG SCREW, VERTEX AXIAL: Performed by: NEUROLOGICAL SURGERY

## 2017-11-08 PROCEDURE — 160048 HCHG OR STATISTICAL LEVEL 1-5: Performed by: NEUROLOGICAL SURGERY

## 2017-11-08 DEVICE — SCREW MAS VERTEX 3.5X14MM (1TCONX16=16): Type: IMPLANTABLE DEVICE | Status: FUNCTIONAL

## 2017-11-08 DEVICE — IMPLANTABLE DEVICE: Type: IMPLANTABLE DEVICE | Status: FUNCTIONAL

## 2017-11-08 DEVICE — GRAFT ACTIFUSE ABX PUTTY 2.5ML: Type: IMPLANTABLE DEVICE | Status: FUNCTIONAL

## 2017-11-08 DEVICE — SCREW SET VERTEX (1TCONX30=30): Type: IMPLANTABLE DEVICE | Status: FUNCTIONAL

## 2017-11-08 DEVICE — SCREW MAS VERTEX 4.0X26MM (1TCONX4=4): Type: IMPLANTABLE DEVICE | Status: FUNCTIONAL

## 2017-11-08 DEVICE — CONNECTOR CLOSED END LATERAL 10MM (1TCONX2=2): Type: IMPLANTABLE DEVICE | Status: FUNCTIONAL

## 2017-11-08 DEVICE — GRAFT ACTIFUSE ABX PUTTY 5ML: Type: IMPLANTABLE DEVICE | Status: FUNCTIONAL

## 2017-11-08 RX ORDER — SODIUM CHLORIDE 9 MG/ML
INJECTION, SOLUTION INTRAVENOUS CONTINUOUS
Status: DISCONTINUED | OUTPATIENT
Start: 2017-11-08 | End: 2017-11-13 | Stop reason: HOSPADM

## 2017-11-08 RX ORDER — AMOXICILLIN 250 MG
1 CAPSULE ORAL NIGHTLY
Status: DISCONTINUED | OUTPATIENT
Start: 2017-11-08 | End: 2017-11-13 | Stop reason: HOSPADM

## 2017-11-08 RX ORDER — LOSARTAN POTASSIUM AND HYDROCHLOROTHIAZIDE 12.5; 5 MG/1; MG/1
1 TABLET ORAL DAILY
Status: DISCONTINUED | OUTPATIENT
Start: 2017-11-08 | End: 2017-11-08

## 2017-11-08 RX ORDER — LOSARTAN POTASSIUM 50 MG/1
50 TABLET ORAL
Status: DISCONTINUED | OUTPATIENT
Start: 2017-11-09 | End: 2017-11-13 | Stop reason: HOSPADM

## 2017-11-08 RX ORDER — LIDOCAINE HYDROCHLORIDE 10 MG/ML
0.5 INJECTION, SOLUTION INFILTRATION; PERINEURAL
Status: ACTIVE | OUTPATIENT
Start: 2017-11-08 | End: 2017-11-09

## 2017-11-08 RX ORDER — DEXTROSE MONOHYDRATE 25 G/50ML
25 INJECTION, SOLUTION INTRAVENOUS
Status: DISCONTINUED | OUTPATIENT
Start: 2017-11-08 | End: 2017-11-13 | Stop reason: HOSPADM

## 2017-11-08 RX ORDER — MIDAZOLAM HYDROCHLORIDE 1 MG/ML
INJECTION INTRAMUSCULAR; INTRAVENOUS
Status: DISPENSED
Start: 2017-11-08 | End: 2017-11-09

## 2017-11-08 RX ORDER — METFORMIN HYDROCHLORIDE 500 MG/1
500 TABLET, EXTENDED RELEASE ORAL DAILY
Status: DISCONTINUED | OUTPATIENT
Start: 2017-11-08 | End: 2017-11-13 | Stop reason: HOSPADM

## 2017-11-08 RX ORDER — CEFAZOLIN SODIUM 2 G/100ML
2 INJECTION, SOLUTION INTRAVENOUS EVERY 8 HOURS
Status: COMPLETED | OUTPATIENT
Start: 2017-11-08 | End: 2017-11-09

## 2017-11-08 RX ORDER — SODIUM CHLORIDE AND POTASSIUM CHLORIDE 150; 900 MG/100ML; MG/100ML
INJECTION, SOLUTION INTRAVENOUS CONTINUOUS
Status: DISCONTINUED | OUTPATIENT
Start: 2017-11-08 | End: 2017-11-12

## 2017-11-08 RX ORDER — CALCIUM CARBONATE 500 MG/1
500 TABLET, CHEWABLE ORAL 2 TIMES DAILY
Status: DISCONTINUED | OUTPATIENT
Start: 2017-11-08 | End: 2017-11-13 | Stop reason: HOSPADM

## 2017-11-08 RX ORDER — LABETALOL HYDROCHLORIDE 5 MG/ML
10 INJECTION, SOLUTION INTRAVENOUS
Status: DISCONTINUED | OUTPATIENT
Start: 2017-11-08 | End: 2017-11-13 | Stop reason: HOSPADM

## 2017-11-08 RX ORDER — SODIUM CHLORIDE, SODIUM LACTATE, POTASSIUM CHLORIDE, AND CALCIUM CHLORIDE .6; .31; .03; .02 G/100ML; G/100ML; G/100ML; G/100ML
IRRIGANT IRRIGATION
Status: DISCONTINUED | OUTPATIENT
Start: 2017-11-08 | End: 2017-11-08 | Stop reason: HOSPADM

## 2017-11-08 RX ORDER — METFORMIN HYDROCHLORIDE 500 MG/1
500 TABLET, EXTENDED RELEASE ORAL DAILY
Status: ON HOLD | COMMUNITY
End: 2017-11-10

## 2017-11-08 RX ORDER — HYDROMORPHONE HYDROCHLORIDE 2 MG/ML
INJECTION, SOLUTION INTRAMUSCULAR; INTRAVENOUS; SUBCUTANEOUS
Status: COMPLETED
Start: 2017-11-08 | End: 2017-11-08

## 2017-11-08 RX ORDER — ENEMA 19; 7 G/133ML; G/133ML
1 ENEMA RECTAL
Status: COMPLETED | OUTPATIENT
Start: 2017-11-08 | End: 2017-11-12

## 2017-11-08 RX ORDER — POLYETHYLENE GLYCOL 3350 17 G/17G
1 POWDER, FOR SOLUTION ORAL 2 TIMES DAILY PRN
Status: DISCONTINUED | OUTPATIENT
Start: 2017-11-08 | End: 2017-11-13 | Stop reason: HOSPADM

## 2017-11-08 RX ORDER — BUPIVACAINE HYDROCHLORIDE AND EPINEPHRINE 5; 5 MG/ML; UG/ML
INJECTION, SOLUTION EPIDURAL; INTRACAUDAL; PERINEURAL
Status: DISCONTINUED | OUTPATIENT
Start: 2017-11-08 | End: 2017-11-08 | Stop reason: HOSPADM

## 2017-11-08 RX ORDER — METHOCARBAMOL 750 MG/1
750 TABLET, FILM COATED ORAL EVERY 8 HOURS PRN
Status: DISCONTINUED | OUTPATIENT
Start: 2017-11-08 | End: 2017-11-13 | Stop reason: HOSPADM

## 2017-11-08 RX ORDER — DOCUSATE SODIUM 100 MG/1
100 CAPSULE, LIQUID FILLED ORAL 2 TIMES DAILY
Status: DISCONTINUED | OUTPATIENT
Start: 2017-11-08 | End: 2017-11-13 | Stop reason: HOSPADM

## 2017-11-08 RX ORDER — BISACODYL 10 MG
10 SUPPOSITORY, RECTAL RECTAL
Status: DISCONTINUED | OUTPATIENT
Start: 2017-11-08 | End: 2017-11-13 | Stop reason: HOSPADM

## 2017-11-08 RX ORDER — PANTOPRAZOLE SODIUM 40 MG/1
40 TABLET, DELAYED RELEASE ORAL EVERY MORNING
Status: DISCONTINUED | OUTPATIENT
Start: 2017-11-08 | End: 2017-11-08

## 2017-11-08 RX ORDER — OMEPRAZOLE 20 MG/1
20 CAPSULE, DELAYED RELEASE ORAL EVERY MORNING
Status: DISCONTINUED | OUTPATIENT
Start: 2017-11-09 | End: 2017-11-13 | Stop reason: HOSPADM

## 2017-11-08 RX ORDER — MAGNESIUM HYDROXIDE 1200 MG/15ML
LIQUID ORAL
Status: DISCONTINUED | OUTPATIENT
Start: 2017-11-08 | End: 2017-11-08 | Stop reason: HOSPADM

## 2017-11-08 RX ORDER — HYDROCHLOROTHIAZIDE 25 MG/1
12.5 TABLET ORAL
Status: DISCONTINUED | OUTPATIENT
Start: 2017-11-09 | End: 2017-11-13 | Stop reason: HOSPADM

## 2017-11-08 RX ORDER — AMOXICILLIN 250 MG
1 CAPSULE ORAL
Status: DISCONTINUED | OUTPATIENT
Start: 2017-11-08 | End: 2017-11-13 | Stop reason: HOSPADM

## 2017-11-08 RX ORDER — LIDOCAINE AND PRILOCAINE 25; 25 MG/G; MG/G
1 CREAM TOPICAL
Status: ACTIVE | OUTPATIENT
Start: 2017-11-08 | End: 2017-11-09

## 2017-11-08 RX ORDER — BUDESONIDE AND FORMOTEROL FUMARATE DIHYDRATE 160; 4.5 UG/1; UG/1
2 AEROSOL RESPIRATORY (INHALATION)
Status: DISCONTINUED | OUTPATIENT
Start: 2017-11-08 | End: 2017-11-13 | Stop reason: HOSPADM

## 2017-11-08 RX ORDER — ALPRAZOLAM 0.25 MG/1
0.25 TABLET ORAL 2 TIMES DAILY PRN
Status: DISCONTINUED | OUTPATIENT
Start: 2017-11-08 | End: 2017-11-13 | Stop reason: HOSPADM

## 2017-11-08 RX ORDER — LOSARTAN POTASSIUM AND HYDROCHLOROTHIAZIDE 12.5; 5 MG/1; MG/1
1 TABLET ORAL DAILY
COMMUNITY
End: 2018-08-13

## 2017-11-08 RX ADMIN — HYDROMORPHONE HYDROCHLORIDE 0.5 MG: 2 INJECTION INTRAMUSCULAR; INTRAVENOUS; SUBCUTANEOUS at 17:00

## 2017-11-08 RX ADMIN — POTASSIUM CHLORIDE AND SODIUM CHLORIDE 1000 ML: 900; 150 INJECTION, SOLUTION INTRAVENOUS at 18:39

## 2017-11-08 RX ADMIN — HYDROMORPHONE HYDROCHLORIDE 0.3 MG: 2 INJECTION INTRAMUSCULAR; INTRAVENOUS; SUBCUTANEOUS at 16:50

## 2017-11-08 RX ADMIN — STANDARDIZED SENNA CONCENTRATE AND DOCUSATE SODIUM 1 TABLET: 8.6; 5 TABLET, FILM COATED ORAL at 21:50

## 2017-11-08 RX ADMIN — CEFAZOLIN SODIUM 2 G: 2 INJECTION, SOLUTION INTRAVENOUS at 21:50

## 2017-11-08 RX ADMIN — ANTACID TABLETS 500 MG: 500 TABLET, CHEWABLE ORAL at 21:50

## 2017-11-08 RX ADMIN — HYDROMORPHONE HYDROCHLORIDE 0.3 MG: 2 INJECTION INTRAMUSCULAR; INTRAVENOUS; SUBCUTANEOUS at 17:17

## 2017-11-08 ASSESSMENT — PAIN SCALES - GENERAL
PAINLEVEL_OUTOF10: 6
PAINLEVEL_OUTOF10: 8
PAINLEVEL_OUTOF10: 2
PAINLEVEL_OUTOF10: 10
PAINLEVEL_OUTOF10: 8
PAINLEVEL_OUTOF10: 9

## 2017-11-09 LAB
ANION GAP SERPL CALC-SCNC: 8 MMOL/L (ref 0–11.9)
BUN SERPL-MCNC: 18 MG/DL (ref 8–22)
CALCIUM SERPL-MCNC: 7.8 MG/DL (ref 8.5–10.5)
CHLORIDE SERPL-SCNC: 106 MMOL/L (ref 96–112)
CO2 SERPL-SCNC: 25 MMOL/L (ref 20–33)
CREAT SERPL-MCNC: 0.83 MG/DL (ref 0.5–1.4)
ERYTHROCYTE [DISTWIDTH] IN BLOOD BY AUTOMATED COUNT: 41.8 FL (ref 35.9–50)
GFR SERPL CREATININE-BSD FRML MDRD: >60 ML/MIN/1.73 M 2
GLUCOSE BLD-MCNC: 123 MG/DL (ref 65–99)
GLUCOSE BLD-MCNC: 133 MG/DL (ref 65–99)
GLUCOSE BLD-MCNC: 137 MG/DL (ref 65–99)
GLUCOSE BLD-MCNC: 140 MG/DL (ref 65–99)
GLUCOSE BLD-MCNC: 158 MG/DL (ref 65–99)
GLUCOSE SERPL-MCNC: 144 MG/DL (ref 65–99)
HCT VFR BLD AUTO: 36.2 % (ref 37–47)
HGB BLD-MCNC: 12.2 G/DL (ref 12–16)
MCH RBC QN AUTO: 31.4 PG (ref 27–33)
MCHC RBC AUTO-ENTMCNC: 33.7 G/DL (ref 33.6–35)
MCV RBC AUTO: 93.1 FL (ref 81.4–97.8)
PLATELET # BLD AUTO: 257 K/UL (ref 164–446)
PMV BLD AUTO: 10.8 FL (ref 9–12.9)
POTASSIUM SERPL-SCNC: 4 MMOL/L (ref 3.6–5.5)
RBC # BLD AUTO: 3.89 M/UL (ref 4.2–5.4)
SODIUM SERPL-SCNC: 139 MMOL/L (ref 135–145)
WBC # BLD AUTO: 7.6 K/UL (ref 4.8–10.8)

## 2017-11-09 PROCEDURE — 85027 COMPLETE CBC AUTOMATED: CPT

## 2017-11-09 PROCEDURE — 700111 HCHG RX REV CODE 636 W/ 250 OVERRIDE (IP): Performed by: PHYSICIAN ASSISTANT

## 2017-11-09 PROCEDURE — G8979 MOBILITY GOAL STATUS: HCPCS | Mod: CI

## 2017-11-09 PROCEDURE — A9270 NON-COVERED ITEM OR SERVICE: HCPCS

## 2017-11-09 PROCEDURE — G8988 SELF CARE GOAL STATUS: HCPCS | Mod: CJ

## 2017-11-09 PROCEDURE — G8978 MOBILITY CURRENT STATUS: HCPCS | Mod: CM

## 2017-11-09 PROCEDURE — 97166 OT EVAL MOD COMPLEX 45 MIN: CPT

## 2017-11-09 PROCEDURE — A9270 NON-COVERED ITEM OR SERVICE: HCPCS | Performed by: PHYSICIAN ASSISTANT

## 2017-11-09 PROCEDURE — 770001 HCHG ROOM/CARE - MED/SURG/GYN PRIV*

## 2017-11-09 PROCEDURE — 700101 HCHG RX REV CODE 250: Performed by: PHYSICIAN ASSISTANT

## 2017-11-09 PROCEDURE — 82962 GLUCOSE BLOOD TEST: CPT

## 2017-11-09 PROCEDURE — 700102 HCHG RX REV CODE 250 W/ 637 OVERRIDE(OP): Performed by: PHYSICIAN ASSISTANT

## 2017-11-09 PROCEDURE — 97162 PT EVAL MOD COMPLEX 30 MIN: CPT

## 2017-11-09 PROCEDURE — 700102 HCHG RX REV CODE 250 W/ 637 OVERRIDE(OP)

## 2017-11-09 PROCEDURE — G8987 SELF CARE CURRENT STATUS: HCPCS | Mod: CL

## 2017-11-09 PROCEDURE — 700112 HCHG RX REV CODE 229: Performed by: PHYSICIAN ASSISTANT

## 2017-11-09 PROCEDURE — 80048 BASIC METABOLIC PNL TOTAL CA: CPT

## 2017-11-09 PROCEDURE — 36415 COLL VENOUS BLD VENIPUNCTURE: CPT

## 2017-11-09 RX ORDER — ONDANSETRON 2 MG/ML
4 INJECTION INTRAMUSCULAR; INTRAVENOUS EVERY 4 HOURS PRN
Status: DISCONTINUED | OUTPATIENT
Start: 2017-11-09 | End: 2017-11-13 | Stop reason: HOSPADM

## 2017-11-09 RX ORDER — OXYCODONE AND ACETAMINOPHEN 7.5; 325 MG/1; MG/1
1-2 TABLET ORAL EVERY 4 HOURS PRN
Status: DISCONTINUED | OUTPATIENT
Start: 2017-11-09 | End: 2017-11-12

## 2017-11-09 RX ORDER — HYDROCODONE BITARTRATE AND ACETAMINOPHEN 7.5; 325 MG/1; MG/1
1-2 TABLET ORAL EVERY 6 HOURS PRN
Status: DISCONTINUED | OUTPATIENT
Start: 2017-11-09 | End: 2017-11-12

## 2017-11-09 RX ORDER — METOCLOPRAMIDE 10 MG/1
10 TABLET ORAL
Status: DISCONTINUED | OUTPATIENT
Start: 2017-11-09 | End: 2017-11-13 | Stop reason: HOSPADM

## 2017-11-09 RX ORDER — KETOROLAC TROMETHAMINE 30 MG/ML
30 INJECTION, SOLUTION INTRAMUSCULAR; INTRAVENOUS EVERY 6 HOURS PRN
Status: DISCONTINUED | OUTPATIENT
Start: 2017-11-09 | End: 2017-11-13 | Stop reason: HOSPADM

## 2017-11-09 RX ADMIN — VITAMIN D, TAB 1000IU (100/BT) 5000 UNITS: 25 TAB at 07:36

## 2017-11-09 RX ADMIN — DOCUSATE SODIUM 100 MG: 100 CAPSULE ORAL at 21:24

## 2017-11-09 RX ADMIN — ONDANSETRON 4 MG: 2 INJECTION, SOLUTION INTRAMUSCULAR; INTRAVENOUS at 12:26

## 2017-11-09 RX ADMIN — STANDARDIZED SENNA CONCENTRATE AND DOCUSATE SODIUM 1 TABLET: 8.6; 5 TABLET, FILM COATED ORAL at 21:24

## 2017-11-09 RX ADMIN — LOSARTAN POTASSIUM 50 MG: 50 TABLET, FILM COATED ORAL at 07:36

## 2017-11-09 RX ADMIN — DOCUSATE SODIUM 100 MG: 100 CAPSULE ORAL at 07:31

## 2017-11-09 RX ADMIN — METHOCARBAMOL 750 MG: 750 TABLET ORAL at 06:10

## 2017-11-09 RX ADMIN — KETOROLAC TROMETHAMINE 30 MG: 30 INJECTION, SOLUTION INTRAMUSCULAR at 12:26

## 2017-11-09 RX ADMIN — METFORMIN HYDROCHLORIDE 500 MG: 500 TABLET, EXTENDED RELEASE ORAL at 07:38

## 2017-11-09 RX ADMIN — ANTACID TABLETS 500 MG: 500 TABLET, CHEWABLE ORAL at 07:31

## 2017-11-09 RX ADMIN — ANTACID TABLETS 500 MG: 500 TABLET, CHEWABLE ORAL at 21:24

## 2017-11-09 RX ADMIN — BUDESONIDE AND FORMOTEROL FUMARATE DIHYDRATE 2 PUFF: 160; 4.5 AEROSOL RESPIRATORY (INHALATION) at 21:24

## 2017-11-09 RX ADMIN — OXYCODONE HYDROCHLORIDE AND ACETAMINOPHEN 2 TABLET: 7.5; 325 TABLET ORAL at 11:06

## 2017-11-09 RX ADMIN — HYDROCHLOROTHIAZIDE 12.5 MG: 25 TABLET ORAL at 07:35

## 2017-11-09 RX ADMIN — POTASSIUM CHLORIDE AND SODIUM CHLORIDE: 900; 150 INJECTION, SOLUTION INTRAVENOUS at 05:08

## 2017-11-09 RX ADMIN — OMEPRAZOLE 20 MG: 20 CAPSULE, DELAYED RELEASE ORAL at 07:35

## 2017-11-09 RX ADMIN — POTASSIUM CHLORIDE AND SODIUM CHLORIDE: 900; 150 INJECTION, SOLUTION INTRAVENOUS at 15:35

## 2017-11-09 RX ADMIN — CEFAZOLIN SODIUM 2 G: 2 INJECTION, SOLUTION INTRAVENOUS at 05:08

## 2017-11-09 ASSESSMENT — PATIENT HEALTH QUESTIONNAIRE - PHQ9
4. FEELING TIRED OR HAVING LITTLE ENERGY: NEARLY EVERY DAY
8. MOVING OR SPEAKING SO SLOWLY THAT OTHER PEOPLE COULD HAVE NOTICED. OR THE OPPOSITE, BEING SO FIGETY OR RESTLESS THAT YOU HAVE BEEN MOVING AROUND A LOT MORE THAN USUAL: SEVERAL DAYS
2. FEELING DOWN, DEPRESSED, IRRITABLE, OR HOPELESS: NOT AT ALL
9. THOUGHTS THAT YOU WOULD BE BETTER OFF DEAD, OR OF HURTING YOURSELF: NOT AT ALL
SUM OF ALL RESPONSES TO PHQ9 QUESTIONS 1 AND 2: 2
3. TROUBLE FALLING OR STAYING ASLEEP OR SLEEPING TOO MUCH: SEVERAL DAYS
6. FEELING BAD ABOUT YOURSELF - OR THAT YOU ARE A FAILURE OR HAVE LET YOURSELF OR YOUR FAMILY DOWN: NOT AL ALL
SUM OF ALL RESPONSES TO PHQ QUESTIONS 1-9: 10
7. TROUBLE CONCENTRATING ON THINGS, SUCH AS READING THE NEWSPAPER OR WATCHING TELEVISION: NOT AT ALL
5. POOR APPETITE OR OVEREATING: NEARLY EVERY DAY
1. LITTLE INTEREST OR PLEASURE IN DOING THINGS: MORE THAN HALF THE DAYS

## 2017-11-09 ASSESSMENT — COGNITIVE AND FUNCTIONAL STATUS - GENERAL
HELP NEEDED FOR BATHING: A LOT
PERSONAL GROOMING: A LOT
MOVING FROM LYING ON BACK TO SITTING ON SIDE OF FLAT BED: A LOT
DAILY ACTIVITIY SCORE: 12
DRESSING REGULAR UPPER BODY CLOTHING: A LOT
TOILETING: A LOT
TURNING FROM BACK TO SIDE WHILE IN FLAT BAD: UNABLE
DRESSING REGULAR LOWER BODY CLOTHING: A LOT
SUGGESTED CMS G CODE MODIFIER DAILY ACTIVITY: CL
SUGGESTED CMS G CODE MODIFIER MOBILITY: CM
STANDING UP FROM CHAIR USING ARMS: TOTAL
EATING MEALS: A LOT
MOVING TO AND FROM BED TO CHAIR: UNABLE
CLIMB 3 TO 5 STEPS WITH RAILING: TOTAL
MOBILITY SCORE: 7
WALKING IN HOSPITAL ROOM: TOTAL

## 2017-11-09 ASSESSMENT — LIFESTYLE VARIABLES
CONSUMPTION TOTAL: NEGATIVE
HAVE YOU EVER FELT YOU SHOULD CUT DOWN ON YOUR DRINKING: NO
HOW MANY TIMES IN THE PAST YEAR HAVE YOU HAD 5 OR MORE DRINKS IN A DAY: 0
TOTAL SCORE: 0
EVER HAD A DRINK FIRST THING IN THE MORNING TO STEADY YOUR NERVES TO GET RID OF A HANGOVER: NO
ON A TYPICAL DAY WHEN YOU DRINK ALCOHOL HOW MANY DRINKS DO YOU HAVE: 1
HAVE PEOPLE ANNOYED YOU BY CRITICIZING YOUR DRINKING: NO
TOTAL SCORE: 0
EVER_SMOKED: YES
EVER FELT BAD OR GUILTY ABOUT YOUR DRINKING: NO
ALCOHOL_USE: YES
AVERAGE NUMBER OF DAYS PER WEEK YOU HAVE A DRINK CONTAINING ALCOHOL: 0
TOTAL SCORE: 0

## 2017-11-09 ASSESSMENT — PAIN SCALES - GENERAL
PAINLEVEL_OUTOF10: 5
PAINLEVEL_OUTOF10: 9
PAINLEVEL_OUTOF10: 4
PAINLEVEL_OUTOF10: 2
PAINLEVEL_OUTOF10: 0
PAINLEVEL_OUTOF10: 5
PAINLEVEL_OUTOF10: 3
PAINLEVEL_OUTOF10: 2
PAINLEVEL_OUTOF10: 10
PAINLEVEL_OUTOF10: 4
PAINLEVEL_OUTOF10: 2
PAINLEVEL_OUTOF10: 1
PAINLEVEL_OUTOF10: 8
PAINLEVEL_OUTOF10: 5
PAINLEVEL_OUTOF10: 6

## 2017-11-09 ASSESSMENT — GAIT ASSESSMENTS: GAIT LEVEL OF ASSIST: UNABLE TO PARTICIPATE

## 2017-11-09 ASSESSMENT — ACTIVITIES OF DAILY LIVING (ADL): TOILETING: INDEPENDENT

## 2017-11-09 NOTE — PROGRESS NOTES
Pt is AAOx4.  Moves all extremities 4/5.  Complains of numbness and tingling in toes.  Pt has yet to ambulate.  Pt complains of 2/10 pain.  Morphine PCA in use.  Pt denies nausea/vomiting.  Pt has yet to eat, tolerating water and PO meds. Rogers in place.  Hemovac drains in place, compressed.  Surgical incision/dressing CDI.  POC discussed, pt verbalizes understanding.  Bed alarm on.  Call light within reach, bed in lowest position.

## 2017-11-09 NOTE — PROGRESS NOTES
Neurosurgery Progress Note    Subjective:  POD #1  Dr. Ferreira to see later today  Nausea this am  Surgical site pain  Pain controlled w/ PCA  Rogers placed    Exam:  Limited due to pain  Wound C/D/I  MCKEON  Trace weakness throughout exam  -poor effort  Labs stable  VSS  Drain: 60 and 40cc    BP  Min: 113/70  Max: 141/72  Pulse  Av.5  Min: 84  Max: 101  Resp  Avg: 15  Min: 10  Max: 18  Temp  Av.6 °C (97.8 °F)  Min: 36.1 °C (97 °F)  Max: 37.2 °C (99 °F)  SpO2  Av.3 %  Min: 94 %  Max: 100 %    No Data Recorded    Recent Labs      17   0505   WBC  7.6   RBC  3.89*   HEMOGLOBIN  12.2   HEMATOCRIT  36.2*   MCV  93.1   MCH  31.4   MCHC  33.7   RDW  41.8   PLATELETCT  257   MPV  10.8     Recent Labs      17   0505   SODIUM  139   POTASSIUM  4.0   CHLORIDE  106   CO2  25   GLUCOSE  144*   BUN  18               Intake/Output       17 0700 - 17 0659 17 0700 - 11/10/17 0659      2695-0168 7824-3416 Total 8726-0985 2524-5479 Total       Intake    P.O.  --  240 240  --  -- --    P.O. -- 240 240 -- -- --    I.V.  2004.5  1400 3404.5  16.5  -- 16.5    Crystalloid Intake  --  -- -- --    PCA End of Shift Total Volume (ml) 4.5 -- 4.5 16.5 -- 16.5    IV Piggyback Volume (IV Piggyback) -- 200 200 -- -- --    IV Volume (< Enter Name Here >) -- 1200 1200 -- -- --    Total Intake 2004.5 1640 3644.5 16.5 -- 16.5       Output    Urine  250  1200 1450  --  -- --    Indwelling Cathether 250 1200 1450 -- -- --    Drains  0  190 190  --  -- --    Hemovac 2 0 70 70 -- -- --    Hemovac 1 0 120 120 -- -- --    Blood  150  -- 150  --  -- --    Est. Blood Loss (mL) 150 -- 150 -- -- --    Total Output 400 1390 1790 -- -- --       Net I/O     1604.5 250 1854.5 16.5 -- 16.5            Intake/Output Summary (Last 24 hours) at 17 0832  Last data filed at 17 0700   Gross per 24 hour   Intake             3661 ml   Output             1790 ml   Net             1871 ml            • insulin lispro   1-6 Units 4X/DAY ACHS   • ondansetron  4 mg Q4HRS PRN   • metoclopramide  10 mg PC PRN + HS PRN   • ketorolac  30 mg Q6HRS PRN   • On-Q Pump - Ropivacaine 270 mL (fixed-flow rate, dual-lumen)   Continuous   • lidocaine-prilocaine  1 Application Once PRN    Or   • lidocaine  0.5 mL Once PRN   • NS   Continuous   • metformin ER  500 mg DAILY   • Pharmacy Consult Request  1 Each PRN   • MD ALERT...Do not administer NSAIDS or ASPIRIN unless ORDERED By Neurosurgery  1 Each PRN   • docusate sodium  100 mg BID   • senna-docusate  1 Tab Nightly   • senna-docusate  1 Tab Q24HRS PRN   • polyethylene glycol/lytes  1 Packet BID PRN   • magnesium hydroxide  30 mL QDAY PRN   • bisacodyl  10 mg Q24HRS PRN   • fleet  1 Each Once PRN   • 0.9 % NaCl with KCl 20 mEq 1,000 mL   Continuous   • morphine   Continuous   • methocarbamol  750 mg Q8HRS PRN   • alprazolam  0.25 mg BID PRN   • labetalol  10 mg Q HOUR PRN   • benzocaine-menthol  1 Lozenge Q2HRS PRN   • calcium carbonate  500 mg BID   • vitamin D  5,000 Units DAILY   • glucose 4 g  16 g Q15 MIN PRN    And   • dextrose 50%  25 mL Q15 MIN PRN   • omeprazole  20 mg QAM   • budesonide-formoterol  2 Puff BID (RT)   • losartan  50 mg Q DAY   • hydrochlorothiazide  12.5 mg Q DAY       Assessment and Plan:  Hospital day #1  POD #1  Prophylactic anticoagulation: no    Plan:  1. Mobilize with PT/OT  2. D/C pena when mobile  3. Zofran for nausea-reglan also charted  4. Toradol PRN for pain  5. D/C PCA-start orals  6. Case management to assist with placement. Rehab vs. SNF  7. Keep hemovac drains       LS- agree with above. Reviewed patient today.Strength ok.

## 2017-11-09 NOTE — DISCHARGE PLANNING
PMR referral from Angel CLAROS.      Cervical stenosis/myelopathy s/p surgical repair DOS 11/08/2017. Anticipate post acute services to facilitate a successful transition to community home alone with outpatient support. Therapy evaluation pending will follow for post acute services. No physiatry consult ordered at this time per protocol.

## 2017-11-09 NOTE — THERAPY
"Occupational Therapy Evaluation completed.   Functional Status:  Pt experiencing nausea and extreme pain in posterior neck and along incision site. Also demonstrates confusion initially, stating she is supposed to have an Xray of neck today while nurse reports she had her Xray yesterday. Pt max A for supine>sit, max A LB dressing with socks, and total A for brace management. Min A to maintain balance sitting EOB, head in neck flexion and shoulders elevated, unable to maintain a straight spine which pt reports is close to baseline. Also has baseline limited L hand extension and only has functional use of thumb and first digit on L hand, but reports independent for ADLs before surgery. Unable to tolerate sitting EOB without c/o pain. Min A sit>stand, Max A x2 BTB. Pt in bed with bed alarm on, call light in reach and RN in room aware of pt status.  Plan of Care: Will benefit from Occupational Therapy 4 times per week  Discharge Recommendations:  Equipment: Will Continue to Assess for Equipment Needs. Post-acute therapy Discharge to a transitional care facility for continued skilled therapy services.    73 y/o female s/p cervical surgery. Pt experiencing significant pain and nausea and decreased activity tolerance, severely limiting functional mobility and transfers. Pt also demonstrates decreased BUE AROM and generalized weakness throughout body significantly limiting participation in ADLs, as well as baseline L hand deficits which further limit pt's ability to complete ADLs at this time. Recommend acute OT and post-acute services prior to d/c home as pt lives alone and is unable to care for self at this point.    See \"Rehab Therapy-Acute\" Patient Summary Report for complete documentation.    "

## 2017-11-09 NOTE — CARE PLAN
Problem: Safety  Goal: Will remain free from falls  Outcome: PROGRESSING AS EXPECTED  Assessed the patient for fall risk factors. Provided education regarding the need to call for assistance. Bed alarm in place, bed in lowest position, call light in reach.      Problem: Infection  Goal: Will remain free from infection  Outcome: PROGRESSING AS EXPECTED  Assessed pt for signs and symptoms of infection.  Educated pt regarding hand hygiene.    Problem: Pain Management  Goal: Pain level will decrease to patient's comfort goal  Outcome: PROGRESSING AS EXPECTED  Discussed pain management POC with pt.  Assessed pain Q2hr.  Morphine PCA in use.

## 2017-11-09 NOTE — PROGRESS NOTES
"Dietician asked RN to remove \"Eggs\" from allergy list and change it to \"Other Foods - Eggs to eat\".  "

## 2017-11-09 NOTE — THERAPY
"Physical Therapy Evaluation completed.   Bed Mobility:  Supine to Sit: Maximal Assist  Transfers: Sit to Stand: Minimal Assist  Gait: Level Of Assist: Unable to Participate with No Equipment Needed       Plan of Care: Will benefit from Physical Therapy 4 times per week  Discharge Recommendations: Equipment: Front-Wheel Walker. Post-acute therapy Discharge to a transitional care facility for continued skilled therapy services.    See \"Rehab Therapy-Acute\" Patient Summary Report for complete documentation.   pt presents w/ weakness, decreased activity tolerance, decreased weight shifting, pain, decreased balance, decreased knowledge of prec and limited social support limiting functional mobility. pt will benefit from skilled acute PT services to address deficits., pt in 10/10 pain during session and trying to deep breath through the pain but pt also crying during session. pt left in bed and educated to work onincreased HOB and upright sitting toelrance in bed to prep for sitting up and standing tolerance. pt provided and educated on cervical handout and spinal prec. pt will most likely need post acute placement for further therapy prior to dc home. pt left in bed. RN notified of session and mobility.   "

## 2017-11-09 NOTE — DISCHARGE PLANNING
Received choice form from Kristin). Referral sent to [facility] and [facility].

## 2017-11-09 NOTE — PROGRESS NOTES
Pt A&Ox4, denies any numbness, states she has tingling in her R thumb and L index finger, pain is 9/10 (pt educated on using her PCA when she needs it).    Bed alarm on, call light within reach, pt educated on importance of using the call light when she needs assistance, pt verbalized understanding.

## 2017-11-09 NOTE — PROGRESS NOTES
1306 - HARLAN Melton paged to give him update on pt's therapy this morning and to ask if pena should still be removed.    1308 - Per HARLAN Melton, we can keep the pena in today if pt unable to mobilize. If pt able to get up and move to a chair or mobilize around the room, then pena can be removed.

## 2017-11-09 NOTE — DISCHARGE PLANNING
TCN met with patient to discuss her transitional care options. Orders for acute rehab and SNF have been entered. Patient is agreeable to either setting. Patient is pending therapy evaluations. Choice faxed to [facility] for SNF if needed. Patient selected [facility]. TCN to follow as needed for DC planning.

## 2017-11-09 NOTE — OP REPORT
1.DATE OF SURGERY: 11/8/2017    2. SURGEON:  Fabiola Ferreira MD, PhD, FRACS, FACS, FAANS    3. ASSISTANT:  Geronimo Otriz PA-C    4. TYPE OF ANESTHESIA:  General anesthesia with endotracheal intubation, Ez Cid MD    5. PREOPERATIVE DIAGNOSIS:  Cervical stenosis/myelopathy    6. POSTOPERATIVE DIAGNOSIS:  Cervical stenosis/myelopathy    7. HISTORY: This is a 71-year-old woman who presents with low back pain, neck pain, left arm pain and left leg pain.. She initially presented with low back pain.   Processes instability. She feels unstable when she walks. She has stiffness in the neck. She's been through extensive pain management. She has this amount of stenosis of the neck. Her preoperative examination showed there was a flexion contracture in the left hand. The some thenar wasting. She had an antalgic unsteady gait. Extension causes pain down her back. On the left hand was impaired in terms of its movements. Her MRI scan showed multilevel cervical spondylosis with cord compression. Her EMG showed chronic radiculopathies. We offered a surgery to try to improve her symptoms. No guarantees were given to the outcome.    8. PREOPERATIVE PHYSICAL EXAMINATION: See formal admission H and P    9. TITLE OF PROCEDURE:    1. H9-F2-P5-C6-C7-T1 posterior decompressive laminectomy and bilaterally foraminotomies.  2. S2-K3-W8-C6-C7-T1 posterolateral fusion using local morcellized autograft and bone graft substitute.  3. Y7-E1-Z0-C6-C7-T1 segmental fixation using posterior cervical lateral mass and pedicle screws.  4. Microscopic microdissection.  5. Fluoroscopic guidance for placement of screws.  6. O- Arm navigation for screw placement  7. CPT Code 46771 Unlisted Procedure- i/o On-Q Pain catheters in paraspinal musculature    10. OPERATIVE FINDINGS:  Stenosis as outlined above. This was due to a combination of facet arthropathy and  ligamentous hypertrophy.    11. OPERATED LEVELS: as above    12. IMPLANTS USED:  Scooters  Vertex posterior cervical screws and rods  Actifuse bone graft substitute    13. COMPLICATIONS:  Nil.    14. ESTIMATED BLOOD LOSS:   150     mL    15. OPERATIVE DETAILS:    After fully informed consent, the patient was brought to the operating room in [facility].  General anesthesia was administered.  The patient was given intravenous antibiotic.  A Rogers catheter was placed.  The head was held in 3-pin Panchal fixation.  For the case, we used AP and lateral fluoroscopy as well as neurophysiological monitoring.  The patient was carefully turned prone.  We used a We used the OSI with hip/chest/thight.  The arms were by the sides and these were tucked in with the sheet acting as a sling.  Ulnar pads were in place.  The head was kept in a relatively neutral position and fixed to the table using the Panchal adapter.  All pressure points were padded and the posterior cervical region and lower occipital region were shaved in preparation for surgery.  I took a lateral x-ray to ensure satisfactory position.  The face and eyes were checked.  The Panchal was tightened up.  The shoulders were taped down to allow lateral visualization.  The posterior cervical region was prepped and draped in standard fashion.    After fluoroscopic localization, a midline incision was affected over the spinous processes from C3 to T1.  A bilateral subperiosteal exposure was affected.  Great care was taken not to violate the C2-C3 or T1-T2 facet joints.     The O- arm was then brought in. for the T1 pedicle screws,  A clamp was placed on T1 spinous process and an O- arm spin was effected. The O arm was used for cannulating, tapping, palpating and placing 4 x 26 mm screws.  The screws were stimulated electrophysiologically to ensure there was not breach I then removed the clamp.The left screw measure at 6 mA. I did a foraminotomy on the left at C7/T1 to ensure no foraminal breach.     The lateral masses were initially  cannulated using a 30/30 trajectory.  In each case, the lateral masses were cannulated, palpated, tapped.  Screws were placed at a later time.  The facet joints were decorticated with a 3-angle curette and a drill.       The microscope was brought into the field of view before the decompression.   Under the microscope, I then performed a laminectomy.  This was affected at the junction of the lamina and the lateral masses from C3 down to T1.  I undercut T1 and also took out the ligament at C2-C3.  Drilling at a 45 degree angle, I thinned down the lamina and then using a 1 mm Kerrison punch all the remaining bone and ligament attachments were released on either side.  The lamina were then lifted off en bloc from C3-C7.  T1 was undercut.  Hemostasis obtained.  That bone graft was morcellized for later grafting of the posterolateral gutters.  It was mixed with Actifuse.  Hemostasis was obtained.  Foraminotomies had effectively been done, but these were completed with 1 mm Kerrison punch as needed as I had been drilling at the junction of the lamina at the facet joints.  The facet joints were then decorticated.  I then turned to placement of the vertex lateral mass screws.  Standard 3.5 x 14 mm screws were placed.  A single milo was then measured to the appropriate size.  I placed a lateral bend on this as well as a lordotic bend on it and offset connectors were used to secure to the T1 pedicle screws.  The construct was put into place.  Locking caps were applied on either side and all the instrumentation was tightened.  Over the decorticated bone from the C3 down to T1, I placed the morcellized autograft mixed with bone graft substitute.  The microscope was taken out of the field of view.  Meticulous hemostasis obtained.  Final AP and lateral x-ray was taken for confirmation that the fluoroscopic placement of the screws was adequate.  Fluoroscopic guidance was used for the screws as deemed appropriate.  Hemostasis  obtained.  Two suction subfascial Hemovacs were placed and stitched into place with 2-0 vicryl suture.  Two paraspinal On-Q pain catheters were also placed.  The wound had vancomycin powder placed.  Thorough irrigation was effected prior to this.  The wound was then closed in multiple interrupted layers using 1-0 Vicryl sutures for the fascia, 2-0 Vicryl for the subdermis, and then staples for skin.  At the end of the case, a dressing was applied. Dermabond had been placed over the On-Q pain catheters, which were placed in the paraspinal musculature under direct visualization.  All counts were correct and all instruments were accounted for.  The patient was carefully turned supine.  The Panchal head clamp was taken off.  Neurophysiological monitoring has been stable throughout and a [place]-J collar was applied.  At the end of the case, the patient was moving her upper and lower extremities well.    16. PROGNOSIS:    The surgery went well.  The patient has been decompressed.  At the end of the case, the patient awoke moving his/her upper and lower extremities well.    Fabiola Ferreira MD    cc:  Jasbir Ludwig

## 2017-11-09 NOTE — DISCHARGE PLANNING
Care Transition Team Assessment    IHD met with pt at bedside. Pt currently lives alone at home, but states that she expects to be discharged to rehab. She does not have DME, home O2, or HHC at this time.    Information Source  Orientation : Oriented x 4  Information Given By: Patient  Informant's Name: Margy  Who is responsible for making decisions for patient? : Patient         Elopement Risk  Legal Hold: No  Ambulatory or Self Mobile in Wheelchair: No-Not an Elopement Risk  Elopement Risk: Not at Risk for Elopement         Discharge Preparedness  What is your plan after discharge?: Other (comment) (Rehab)  What are your discharge supports?: Child  Prior Functional Level: Ambulatory, Independent with Activities of Daily Living, Independent with Medication Management  Difficulity with ADLs: None  Difficulity with IADLs: Driving  Difficulity with IADL Comments: assisted by children    Functional Assesment  Prior Functional Level: Ambulatory, Independent with Activities of Daily Living, Independent with Medication Management    Finances  Financial Barriers to Discharge: No  Prescription Coverage: Yes ([employer] in [place] )    Vision / Hearing Impairment  Right Eye Vision: Impaired, Wears Glasses  Left Eye Vision: Impaired, Wears Glasses    Values / Beliefs / Concerns  Spiritual Requests During Hospitalization: No         Domestic Abuse  Have you ever been the victim of abuse or violence?: No  Physical Abuse or Sexual Abuse: No  Verbal Abuse or Emotional Abuse: No  Possible Abuse Reported to:: Not Applicable    Psychological Assessment  History of Substance Abuse: None  History of Psychiatric Problems: No  Non-compliant with Treatment: No    Discharge Risks or Barriers  Discharge risks or barriers?: No    Anticipated Discharge Information  Anticipated discharge disposition: Acute rehab  Discharge Address: unknown at this time  Discharge Contact Phone Number: [phone]

## 2017-11-10 ENCOUNTER — HOSPITAL ENCOUNTER (INPATIENT)
Facility: REHABILITATION | Age: 72
End: 2017-11-10
Attending: PHYSICAL MEDICINE & REHABILITATION | Admitting: PHYSICAL MEDICINE & REHABILITATION
Payer: MEDICARE

## 2017-11-10 LAB
ANION GAP SERPL CALC-SCNC: 4 MMOL/L (ref 0–11.9)
BUN SERPL-MCNC: 11 MG/DL (ref 8–22)
CALCIUM SERPL-MCNC: 8.1 MG/DL (ref 8.5–10.5)
CHLORIDE SERPL-SCNC: 107 MMOL/L (ref 96–112)
CO2 SERPL-SCNC: 26 MMOL/L (ref 20–33)
CREAT SERPL-MCNC: 0.71 MG/DL (ref 0.5–1.4)
ERYTHROCYTE [DISTWIDTH] IN BLOOD BY AUTOMATED COUNT: 42.1 FL (ref 35.9–50)
GFR SERPL CREATININE-BSD FRML MDRD: >60 ML/MIN/1.73 M 2
GLUCOSE BLD-MCNC: 114 MG/DL (ref 65–99)
GLUCOSE BLD-MCNC: 115 MG/DL (ref 65–99)
GLUCOSE BLD-MCNC: 156 MG/DL (ref 65–99)
GLUCOSE BLD-MCNC: 193 MG/DL (ref 65–99)
GLUCOSE SERPL-MCNC: 124 MG/DL (ref 65–99)
HCT VFR BLD AUTO: 34 % (ref 37–47)
HGB BLD-MCNC: 11.5 G/DL (ref 12–16)
MCH RBC QN AUTO: 31.3 PG (ref 27–33)
MCHC RBC AUTO-ENTMCNC: 33.8 G/DL (ref 33.6–35)
MCV RBC AUTO: 92.4 FL (ref 81.4–97.8)
PLATELET # BLD AUTO: 245 K/UL (ref 164–446)
PMV BLD AUTO: 11.1 FL (ref 9–12.9)
POTASSIUM SERPL-SCNC: 3.9 MMOL/L (ref 3.6–5.5)
RBC # BLD AUTO: 3.68 M/UL (ref 4.2–5.4)
SODIUM SERPL-SCNC: 137 MMOL/L (ref 135–145)
WBC # BLD AUTO: 6.9 K/UL (ref 4.8–10.8)

## 2017-11-10 PROCEDURE — 97535 SELF CARE MNGMENT TRAINING: CPT

## 2017-11-10 PROCEDURE — A9270 NON-COVERED ITEM OR SERVICE: HCPCS | Performed by: PHYSICIAN ASSISTANT

## 2017-11-10 PROCEDURE — 700111 HCHG RX REV CODE 636 W/ 250 OVERRIDE (IP): Performed by: PHYSICIAN ASSISTANT

## 2017-11-10 PROCEDURE — A9270 NON-COVERED ITEM OR SERVICE: HCPCS

## 2017-11-10 PROCEDURE — 80048 BASIC METABOLIC PNL TOTAL CA: CPT

## 2017-11-10 PROCEDURE — 82962 GLUCOSE BLOOD TEST: CPT

## 2017-11-10 PROCEDURE — 770001 HCHG ROOM/CARE - MED/SURG/GYN PRIV*

## 2017-11-10 PROCEDURE — 700102 HCHG RX REV CODE 250 W/ 637 OVERRIDE(OP): Performed by: PHYSICIAN ASSISTANT

## 2017-11-10 PROCEDURE — 85027 COMPLETE CBC AUTOMATED: CPT

## 2017-11-10 PROCEDURE — 700112 HCHG RX REV CODE 229: Performed by: PHYSICIAN ASSISTANT

## 2017-11-10 PROCEDURE — 700102 HCHG RX REV CODE 250 W/ 637 OVERRIDE(OP)

## 2017-11-10 PROCEDURE — 36415 COLL VENOUS BLD VENIPUNCTURE: CPT

## 2017-11-10 PROCEDURE — 700101 HCHG RX REV CODE 250: Performed by: PHYSICIAN ASSISTANT

## 2017-11-10 PROCEDURE — 51798 US URINE CAPACITY MEASURE: CPT

## 2017-11-10 RX ORDER — ONDANSETRON 2 MG/ML
4 INJECTION INTRAMUSCULAR; INTRAVENOUS EVERY 4 HOURS PRN
Qty: 15 ML
Start: 2017-11-10 | End: 2018-08-13

## 2017-11-10 RX ORDER — METHOCARBAMOL 750 MG/1
750 TABLET, FILM COATED ORAL EVERY 8 HOURS PRN
Qty: 120 TAB
Start: 2017-11-10 | End: 2018-08-13

## 2017-11-10 RX ORDER — BUDESONIDE AND FORMOTEROL FUMARATE DIHYDRATE 160; 4.5 UG/1; UG/1
2 AEROSOL RESPIRATORY (INHALATION) 2 TIMES DAILY
Start: 2017-11-10

## 2017-11-10 RX ORDER — OXYCODONE AND ACETAMINOPHEN 7.5; 325 MG/1; MG/1
1-2 TABLET ORAL EVERY 4 HOURS PRN
Qty: 30 TAB | Refills: 0
Start: 2017-11-10 | End: 2018-08-13

## 2017-11-10 RX ORDER — LABETALOL HYDROCHLORIDE 5 MG/ML
10 INJECTION, SOLUTION INTRAVENOUS
Refills: 0
Start: 2017-11-10 | End: 2018-08-13

## 2017-11-10 RX ORDER — BISACODYL 10 MG
10 SUPPOSITORY, RECTAL RECTAL
Refills: 0
Start: 2017-11-10

## 2017-11-10 RX ORDER — ALPRAZOLAM 0.25 MG/1
0.25 TABLET ORAL 2 TIMES DAILY PRN
Qty: 30 TAB | Refills: 0
Start: 2017-11-10

## 2017-11-10 RX ORDER — HYDROCODONE BITARTRATE AND ACETAMINOPHEN 7.5; 325 MG/1; MG/1
1-2 TABLET ORAL EVERY 6 HOURS PRN
Qty: 20 TAB | Refills: 0
Start: 2017-11-10 | End: 2018-08-13

## 2017-11-10 RX ORDER — AMOXICILLIN 250 MG
1 CAPSULE ORAL DAILY
Qty: 30 TAB | Refills: 0
Start: 2017-11-10 | End: 2018-08-13

## 2017-11-10 RX ORDER — HYDROCHLOROTHIAZIDE 12.5 MG/1
12.5 TABLET ORAL DAILY
Qty: 30 TAB
Start: 2017-11-10 | End: 2018-08-13

## 2017-11-10 RX ORDER — LOSARTAN POTASSIUM 50 MG/1
50 TABLET ORAL DAILY
Qty: 30 TAB
Start: 2017-11-10

## 2017-11-10 RX ADMIN — POTASSIUM CHLORIDE AND SODIUM CHLORIDE: 900; 150 INJECTION, SOLUTION INTRAVENOUS at 01:19

## 2017-11-10 RX ADMIN — HYDROCHLOROTHIAZIDE 12.5 MG: 25 TABLET ORAL at 09:14

## 2017-11-10 RX ADMIN — POTASSIUM CHLORIDE AND SODIUM CHLORIDE: 900; 150 INJECTION, SOLUTION INTRAVENOUS at 11:29

## 2017-11-10 RX ADMIN — POTASSIUM CHLORIDE AND SODIUM CHLORIDE: 900; 150 INJECTION, SOLUTION INTRAVENOUS at 19:57

## 2017-11-10 RX ADMIN — VITAMIN D, TAB 1000IU (100/BT) 5000 UNITS: 25 TAB at 09:13

## 2017-11-10 RX ADMIN — HYDROCODONE BITARTRATE AND ACETAMINOPHEN 2 TABLET: 7.5; 325 TABLET ORAL at 11:38

## 2017-11-10 RX ADMIN — OXYCODONE HYDROCHLORIDE AND ACETAMINOPHEN 2 TABLET: 7.5; 325 TABLET ORAL at 14:58

## 2017-11-10 RX ADMIN — LOSARTAN POTASSIUM 50 MG: 50 TABLET, FILM COATED ORAL at 09:14

## 2017-11-10 RX ADMIN — BUDESONIDE AND FORMOTEROL FUMARATE DIHYDRATE 2 PUFF: 160; 4.5 AEROSOL RESPIRATORY (INHALATION) at 09:18

## 2017-11-10 RX ADMIN — METFORMIN HYDROCHLORIDE 500 MG: 500 TABLET, EXTENDED RELEASE ORAL at 09:18

## 2017-11-10 RX ADMIN — OMEPRAZOLE 20 MG: 20 CAPSULE, DELAYED RELEASE ORAL at 09:14

## 2017-11-10 RX ADMIN — OXYCODONE HYDROCHLORIDE AND ACETAMINOPHEN 2 TABLET: 7.5; 325 TABLET ORAL at 09:14

## 2017-11-10 RX ADMIN — OXYCODONE HYDROCHLORIDE AND ACETAMINOPHEN 2 TABLET: 7.5; 325 TABLET ORAL at 22:24

## 2017-11-10 RX ADMIN — KETOROLAC TROMETHAMINE 30 MG: 30 INJECTION, SOLUTION INTRAMUSCULAR at 01:25

## 2017-11-10 RX ADMIN — BUDESONIDE AND FORMOTEROL FUMARATE DIHYDRATE 2 PUFF: 160; 4.5 AEROSOL RESPIRATORY (INHALATION) at 19:41

## 2017-11-10 RX ADMIN — ANTACID TABLETS 500 MG: 500 TABLET, CHEWABLE ORAL at 09:13

## 2017-11-10 RX ADMIN — ALPRAZOLAM 0.25 MG: 0.25 TABLET ORAL at 19:43

## 2017-11-10 RX ADMIN — ANTACID TABLETS 500 MG: 500 TABLET, CHEWABLE ORAL at 19:42

## 2017-11-10 RX ADMIN — DOCUSATE SODIUM 100 MG: 100 CAPSULE ORAL at 09:13

## 2017-11-10 RX ADMIN — STANDARDIZED SENNA CONCENTRATE AND DOCUSATE SODIUM 1 TABLET: 8.6; 5 TABLET, FILM COATED ORAL at 19:41

## 2017-11-10 RX ADMIN — METHOCARBAMOL 750 MG: 750 TABLET ORAL at 19:42

## 2017-11-10 RX ADMIN — MAGNESIUM HYDROXIDE 30 ML: 400 SUSPENSION ORAL at 13:58

## 2017-11-10 RX ADMIN — DOCUSATE SODIUM 100 MG: 100 CAPSULE ORAL at 19:41

## 2017-11-10 ASSESSMENT — COGNITIVE AND FUNCTIONAL STATUS - GENERAL
TOILETING: A LOT
SUGGESTED CMS G CODE MODIFIER DAILY ACTIVITY: CL
DRESSING REGULAR UPPER BODY CLOTHING: A LOT
HELP NEEDED FOR BATHING: A LOT
PERSONAL GROOMING: A LOT
DRESSING REGULAR LOWER BODY CLOTHING: A LOT
EATING MEALS: A LITTLE
DAILY ACTIVITIY SCORE: 13

## 2017-11-10 ASSESSMENT — PAIN SCALES - GENERAL
PAINLEVEL_OUTOF10: 7
PAINLEVEL_OUTOF10: 10
PAINLEVEL_OUTOF10: 9
PAINLEVEL_OUTOF10: 6
PAINLEVEL_OUTOF10: 2

## 2017-11-10 NOTE — CONSULTS
Physical Medicine and Rehabilitation Consultation    Date of Consultation: 11/10/2017  Consulting provider: Payam Boyer M.D.  Reason for consultation: assess for acute inpatient rehab appropriateness  Chief complaint: decreased function after back surgery    HPI: The patient is a 72 y.o. female with a past medical history of GERD, HTN, asthma, DM, and arthritis , admitted on 11/8/2017 10:13 AM with cervical stenosis s/p C3-T1 decompressive laminectomies and bilateral foraminotomies with posterior fusion with Dr. Ferreira.  Patient tolerated procedure well, EBL was 150 mL.  Patient was placed on On-Q pain catheter.  Patient reports severe 10/10 pain in her neck worsened by moving.  Hemovac and pena to be removed. She reports they have stopped the On-Q pump and has transitioned to PO medications.    Patient reports she was independent but limited by pain; she lives alone in a 1 story apt/home.  PT assessed patient on 11/9/17 and patient was maxA for bed mobility and did not tolerate standing.  Patient assessed by OT on 11/9/17 and found to be maxA for ADLs.       The patient currently reports she is in severe pain after OT this AM. Patient reports she has baseline L hand weakness in digits 3-5.  She reports she cannot tolerate 3 hours of therapy and would prefer SNF placement.    ROS:   Comprehensive 12 point ROS was reviewed and all were negative except as noted elsewhere in this document.     PMH:  Past Medical History:   Diagnosis Date   • Acid reflux    • Anemia     H/O   • Anesthesia     slow to wake up   • Arthritis    • Asthma    • Bronchitis     11/4/16   • Dental disorder     lower dentures   • Diabetes     oral meds   • GERD (gastroesophageal reflux disease)    • Glaucoma     developing, she is under opthalmologists care   • Heart burn 10/2017    neck back arms   • Hiatus hernia syndrome    • High cholesterol    • Hypertension    • Indigestion    • Pain    • Pneumonia 01/2014   • Snoring    •  Urinary bladder disorder 2012    Botox for incontinence   • Urinary incontinence     botox       PSH:  Past Surgical History:   Procedure Laterality Date   • POSTERIOR CERVICAL FUSION O-ARM  11/8/2017    Procedure: CERVICAL FUSION POSTERIOR O-ARM C3-T1 INSTRUMENTED;  Surgeon: Fabiola Ferreira M.D.;  Location: SURGERY [facility];  Service: Neurosurgery   • CERVICAL LAMINECTOMY POSTERIOR  11/8/2017    Procedure: CERVICAL LAMINECTOMY POSTERIOR  C3-T1 DECOMPRESSIVE;  Surgeon: Fabiloa Ferreira M.D.;  Location: SURGERY [facility];  Service: Neurosurgery   • FORAMINOTOMY Bilateral 11/8/2017    Procedure: FORAMINOTOMY;  Surgeon: Fabiola Ferreira M.D.;  Location: SURGERY [facility];  Service: Neurosurgery   • OTHER ORTHOPEDIC SURGERY  04/2013    left hand ORIF   • OTHER  1994    lumph nodes removed from rt axilla   • DILATION AND CURETTAGE  1971    for partial miscarriage   • GYN SURGERY      tubal ligation   • OTHER ORTHOPEDIC SURGERY  ?2012    Right knee arthroscopy   • OTHER ORTHOPEDIC SURGERY  2012/1974    bilat bunion removal       FHX:  History reviewed. No pertinent family history.    Medications:  Current Facility-Administered Medications   Medication Dose   • insulin lispro (HUMALOG) injection 1-6 Units  1-6 Units   • ondansetron (ZOFRAN) syringe/vial injection 4 mg  4 mg   • metoclopramide (REGLAN) tablet 10 mg  10 mg   • ketorolac (TORADOL) injection 30 mg  30 mg   • hydrocodone-acetaminophen (NORCO) 7.5-325 MG per tablet 1-2 Tab  1-2 Tab   • oxycodone-acetaminophen (PERCOCET) 7.5-325 MG per tablet 1-2 Tab  1-2 Tab   • ropivacaine (NAROPIN) 2 mg/mL 270 mL in On-Q Pump infusion     • NS infusion     • metformin ER (GLUCOPHAGE XR) tablet 500 mg  500 mg   • Pharmacy Consult Request ...Pain Management Review 1 Each  1 Each   • MD ALERT...Do not administer NSAIDS or ASPIRIN unless ORDERED By Neurosurgery 1 Each  1 Each   • docusate sodium (COLACE) capsule 100 mg  100 mg   • senna-docusate (PERICOLACE or  "SENOKOT S) 8.6-50 MG per tablet 1 Tab  1 Tab   • senna-docusate (PERICOLACE or SENOKOT S) 8.6-50 MG per tablet 1 Tab  1 Tab   • polyethylene glycol/lytes (MIRALAX) PACKET 1 Packet  1 Packet   • magnesium hydroxide (MILK OF MAGNESIA) suspension 30 mL  30 mL   • bisacodyl (DULCOLAX) suppository 10 mg  10 mg   • fleet enema 133 mL  1 Each   • 0.9 % NaCl with KCl 20 mEq infusion     • methocarbamol (ROBAXIN) tablet 750 mg  750 mg   • alprazolam (XANAX) tablet 0.25 mg  0.25 mg   • labetalol (NORMODYNE,TRANDATE) injection 10 mg  10 mg   • benzocaine-menthol (CEPACOL) lozenge 1 Lozenge  1 Lozenge   • calcium carbonate (TUMS) chewable tab 500 mg  500 mg   • vitamin D (cholecalciferol) tablet 5,000 Units  5,000 Units   • glucose 4 g chewable tablet 16 g  16 g    And   • dextrose 50% (D50W) injection 25 mL  25 mL   • omeprazole (PRILOSEC) capsule 20 mg  20 mg   • budesonide-formoterol (SYMBICORT) 160-4.5 MCG/ACT inhaler 2 Puff  2 Puff   • losartan (COZAAR) tablet 50 mg  50 mg   • hydrochlorothiazide (HYDRODIURIL) tablet 12.5 mg  12.5 mg       Allergies:  Allergies   Allergen Reactions   • Aluminum Hives     Aluminum in deoderant   • Asa [Aspirin] Nausea   • Celecoxib Nausea   • Codeine Unspecified     constipation   • Other Food Nausea     Eggs to eat   • Pcn [Penicillins] Nausea   • Soap Hives     Dial soap   • Tetracycline Nausea   • Vioxx Nausea       Social Hx:  Pre-morbidly, this patient lived in a single level home with no steps to enter, alone and able to care for self.   Employment: Retired    Prior level of function:   Independent w FWW    Current level of function:  The patient was evaluated by acute care Physical Therapy and Occupational Therapy; currently requiring maximal assistance for mobility and maximal assistance for ADLs.    Physical Exam:  Vitals: Blood pressure (!) 163/77, pulse (!) 121, temperature 37.3 °C (99.1 °F), resp. rate 16, height 1.651 m (5' 5\"), weight 56.2 kg (123 lb 14.4 oz), SpO2 97 %.  Gen: " NAD  HEENT: NC/AT, PERRLA, moist mucous membranes  Cardio: RRR, no mumurs  Pulm: CTAB, with normal respiratory effort  Abd: Soft NTND, active bowel sounds  Ext: No peripheral edema. No calf tenderness. No clubbing/cyanosis. +dorsal pedalis pulses bilaterally.    Mental status:  A&Ox4 (person, place, date, situation) answers questions appropriately follows commands  Speech: fluent, no aphasia or dysarthria    CRANIAL NERVES:  2,3: visual acuity grossly intact, PERRL  3,4,6: EOMI bilaterally, no nystagmus or diplopia    Motor:  Poor effort for muscles due to pain  At least bilateral UE antigravity with exception of left hand, 5/5 in pointer digit for flexion but no abduction or flexion in digits 3-5  Bilateral LE 5/5     Sensory:   intact to light touch through out    Labs:  Recent Labs      11/09/17   0505  11/10/17   0355   RBC  3.89*  3.68*   HEMOGLOBIN  12.2  11.5*   HEMATOCRIT  36.2*  34.0*   PLATELETCT  257  245     Recent Labs      11/09/17   0505  11/10/17   0355   SODIUM  139  137   POTASSIUM  4.0  3.9   CHLORIDE  106  107   CO2  25  26   GLUCOSE  144*  124*   BUN  18  11   CREATININE  0.83  0.71   CALCIUM  7.8*  8.1*     Recent Results (from the past 24 hour(s))   ACCU-CHEK GLUCOSE    Collection Time: 11/09/17 11:05 AM   Result Value Ref Range    Glucose - Accu-Ck 133 (H) 65 - 99 mg/dL   ACCU-CHEK GLUCOSE    Collection Time: 11/09/17  5:09 PM   Result Value Ref Range    Glucose - Accu-Ck 137 (H) 65 - 99 mg/dL   ACCU-CHEK GLUCOSE    Collection Time: 11/09/17  9:31 PM   Result Value Ref Range    Glucose - Accu-Ck 158 (H) 65 - 99 mg/dL   Basic Metabolic Panel (BMP)    Collection Time: 11/10/17  3:55 AM   Result Value Ref Range    Sodium 137 135 - 145 mmol/L    Potassium 3.9 3.6 - 5.5 mmol/L    Chloride 107 96 - 112 mmol/L    Co2 26 20 - 33 mmol/L    Glucose 124 (H) 65 - 99 mg/dL    Bun 11 8 - 22 mg/dL    Creatinine 0.71 0.50 - 1.40 mg/dL    Calcium 8.1 (L) 8.5 - 10.5 mg/dL    Anion Gap 4.0 0.0 - 11.9   CBC  without Differential    Collection Time: 11/10/17  3:55 AM   Result Value Ref Range    WBC 6.9 4.8 - 10.8 K/uL    RBC 3.68 (L) 4.20 - 5.40 M/uL    Hemoglobin 11.5 (L) 12.0 - 16.0 g/dL    Hematocrit 34.0 (L) 37.0 - 47.0 %    MCV 92.4 81.4 - 97.8 fL    MCH 31.3 27.0 - 33.0 pg    MCHC 33.8 33.6 - 35.0 g/dL    RDW 42.1 35.9 - 50.0 fL    Platelet Count 245 164 - 446 K/uL    MPV 11.1 9.0 - 12.9 fL   ESTIMATED GFR    Collection Time: 11/10/17  3:55 AM   Result Value Ref Range    GFR If African American >60 >60 mL/min/1.73 m 2    GFR If Non African American >60 >60 mL/min/1.73 m 2   ACCU-CHEK GLUCOSE    Collection Time: 11/10/17  6:11 AM   Result Value Ref Range    Glucose - Accu-Ck 114 (H) 65 - 99 mg/dL       ASSESSMENT:    Patient is a 72 y.o. female admitted with cervical stenosis now s/p cervical laminectomies and posterior fusion     Patient with multiple co-morbidities(including but not limited to HTN, asthma, DM, OA); with  functional deficits in mobility/self-care, and Severe de-conditioning.     Pre-morbidly, this patient lived in a single level home with no steps to enter, alone and able to care for self. The patient was evaluated by acute care Physical Therapy and Occupational Therapy; currently requiring maximal assistance for mobility and maximal assistance for ADLs.     Patient currently at maximum assistance and patient lives alone. Per patient preference she does not think she can tolerate 3 hours per day of acute therapy even with multiple breaks in between. At this time recommendation for Skilled Nursing Facility for ongoing rehabilitation at lower intensity.     Thank you for allowing us to participate in her care. Please call with any questions regarding this recommendation.

## 2017-11-10 NOTE — CARE PLAN
Problem: Pain Management  Goal: Pain level will decrease to patient's comfort goal  Routine pain assessment performed and appropriate pain management interventions implemented.    Problem: Skin Integrity  Goal: Risk for impaired skin integrity will decrease  Q2H turns in place, 2 RN skin assessment performed, extra pillows used to float heels, silicone O2 tubing utilized.

## 2017-11-10 NOTE — FACE TO FACE
Face to Face Note  -  Durable Medical Equipment    Geronimo Ortiz P.A.-C. - NPI: [id number]  I certify that this patient is under my care and that they had a durable medical equipment(DME)face to face encounter by myself that meets the physician DME face-to-face encounter requirements with this patient on:    Date of encounter:   Patient:                    MRN:                       YOB: 2016  Margy Adams Merlin  [id number]  1945     The encounter with the patient was in whole, or in part, for the following medical condition, which is the primary reason for durable medical equipment:  Post-Op Surgery    I certify that, based on my findings, the following durable medical equipment is medically necessary:  Walkers.    HOME O2 Saturation Measurements:(Values must be present for Home Oxygen orders)         ,     ,         My Clinical findings support the need for the above equipment due to:  Abnormal Gait    Supporting Symptoms: Fall risk, slow to mobilize

## 2017-11-10 NOTE — DISCHARGE PLANNING
Received DME choice for FWW from [facility]) at 1034.  DME referral for FWW sent to [facility] at 1048.

## 2017-11-10 NOTE — CARE PLAN
Problem: Infection  Goal: Will remain free from infection  IV hub scrubbed when accessed.  Hand hygiene before and after patient care.      Problem: Mobility  Goal: Risk for activity intolerance will decrease  Pt unable to mobilize.  Q 2 turns.  Pain and weakness.

## 2017-11-10 NOTE — DISCHARGE PLANNING
Medical Social Work    Per pts chart, pt needs a DME referral. SW met with pt at bedside who requested that referrals be sent to [facility]. SW addressed all questions and encouraged follow up as needed. SW sent the choice form to [facility] and confirmed receipt of choice form. SW to monitor response status and follow up with care team.

## 2017-11-10 NOTE — PROGRESS NOTES
Pt states she brought a back brace into the OR and it was put into a white box.  It never was given back to her.  She says it would help her with mobility.

## 2017-11-10 NOTE — THERAPY
"Occupational Therapy Treatment completed with focus on ADLs, ADL transfers, patient education and upper extremity function.  Functional Status:  Mod/Max  Plan of Care: Will benefit from Occupational Therapy 4 times per week  Discharge Recommendations:  Equipment Will Continue to Assess for Equipment Needs. Post-acute therapy Discharge to a transitional care facility for continued skilled therapy services.      Patient seen for OT treat focused on OOB ADLs, including BSC use. Patient required consistent encouragement and distraction from reported pain. Patient demo ability to improve UE function when presented with functional demands vs standard assessments. Patient required increased time for all tasks. Patient would continue to benefit from skilled OT in this setting followed by rehab prior to DC home with services.       See \"Rehab Therapy-Acute\" Patient Summary Report for complete documentation.   "

## 2017-11-10 NOTE — CARE PLAN
Problem: Communication  Goal: The ability to communicate needs accurately and effectively will improve  Outcome: PROGRESSING AS EXPECTED  Pt educated on using the call bell.     Problem: Safety  Goal: Will remain free from falls  Outcome: PROGRESSING AS EXPECTED  Non skid socks in place, bed alarm on and call bell at bedside.

## 2017-11-10 NOTE — PROGRESS NOTES
Neurosurgery Progress Note    Subjective:  POD #2  Nausea improved this am  Surgical site pain  -toradol yesterdat  Rogers placed  Slow to mobilize  -has not been up    Exam:  Limited due to pain  Wound C/D/I  MCKEON  Trace weakness throughout exam  -poor effort  Labs stable  VSS  Drain: 45 and 20cc    BP  Min: 116/58  Max: 144/68  Pulse  Av.5  Min: 91  Max: 109  Resp  Av.8  Min: 16  Max: 18  Temp  Av.8 °C (98.3 °F)  Min: 36.2 °C (97.2 °F)  Max: 37.5 °C (99.5 °F)  SpO2  Av.8 %  Min: 93 %  Max: 98 %    No Data Recorded    Recent Labs      17   0505  11/10/17   0355   WBC  7.6  6.9   RBC  3.89*  3.68*   HEMOGLOBIN  12.2  11.5*   HEMATOCRIT  36.2*  34.0*   MCV  93.1  92.4   MCH  31.4  31.3   MCHC  33.7  33.8   RDW  41.8  42.1   PLATELETCT  257  245   MPV  10.8  11.1     Recent Labs      17   0505  11/10/17   0355   SODIUM  139  137   POTASSIUM  4.0  3.9   CHLORIDE  106  107   CO2  25  26   GLUCOSE  144*  124*   BUN  18  11               Intake/Output       17 - 11/10/17 0659 11/10/17 07 - 17 0659      8126-8965 6560-5413 Total 5554-3903 7480-4559 Total       Intake    I.V.  22.5  -- 22.5  --  -- --    PCA End of Shift Total Volume (ml) 22.5 -- 22.5 -- -- --    Other  50  -- 50  --  -- --    Medications (P.O./ Enteral Liquids) 50 -- 50 -- -- --    Total Intake 72.5 -- 72.5 -- -- --       Output    Urine  1000  2900 3900  --  -- --    Indwelling Cathether 1000 2900 3900 -- -- --    Drains  100  115 215  --  -- --    Hemovac 2 20 25 45 -- -- --    Hemovac 1 80 90 170 -- -- --    Total Output 1100 3015 4115 -- -- --       Net I/O     -1027.5 -3015 -4042.5 -- -- --            Intake/Output Summary (Last 24 hours) at 11/10/17 0758  Last data filed at 11/10/17 0600   Gross per 24 hour   Intake                6 ml   Output             4115 ml   Net            -4109 ml            • insulin lispro  1-6 Units 4X/DAY ACHS   • ondansetron  4 mg Q4HRS PRN   • metoclopramide  10 mg PC  PRN + HS PRN   • ketorolac  30 mg Q6HRS PRN   • hydrocodone-acetaminophen  1-2 Tab Q6HRS PRN   • oxycodone-acetaminophen  1-2 Tab Q4HRS PRN   • On-Q Pump - Ropivacaine 270 mL (fixed-flow rate, dual-lumen)   Continuous   • NS   Continuous   • metformin ER  500 mg DAILY   • Pharmacy Consult Request  1 Each PRN   • MD ALERT...Do not administer NSAIDS or ASPIRIN unless ORDERED By Neurosurgery  1 Each PRN   • docusate sodium  100 mg BID   • senna-docusate  1 Tab Nightly   • senna-docusate  1 Tab Q24HRS PRN   • polyethylene glycol/lytes  1 Packet BID PRN   • magnesium hydroxide  30 mL QDAY PRN   • bisacodyl  10 mg Q24HRS PRN   • fleet  1 Each Once PRN   • 0.9 % NaCl with KCl 20 mEq 1,000 mL   Continuous   • methocarbamol  750 mg Q8HRS PRN   • alprazolam  0.25 mg BID PRN   • labetalol  10 mg Q HOUR PRN   • benzocaine-menthol  1 Lozenge Q2HRS PRN   • calcium carbonate  500 mg BID   • vitamin D  5,000 Units DAILY   • glucose 4 g  16 g Q15 MIN PRN    And   • dextrose 50%  25 mL Q15 MIN PRN   • omeprazole  20 mg QAM   • budesonide-formoterol  2 Puff BID (RT)   • losartan  50 mg Q DAY   • hydrochlorothiazide  12.5 mg Q DAY       Assessment and Plan:  Hospital day #2  POD #2  Prophylactic anticoagulation: no    Plan:  1. Mobilize with PT/OT  2. D/C becky cl[facility]  3. Zofran for nausea-reglan also charted  4. Orals for pain medication  5. Case management to assist with placement. Rehab vs. SNF  6. D/C hemovac #2   7. FWW

## 2017-11-11 LAB
APPEARANCE UR: CLEAR
BILIRUB UR QL STRIP.AUTO: NEGATIVE
COLOR UR: YELLOW
GLUCOSE BLD-MCNC: 111 MG/DL (ref 65–99)
GLUCOSE BLD-MCNC: 137 MG/DL (ref 65–99)
GLUCOSE BLD-MCNC: 144 MG/DL (ref 65–99)
GLUCOSE BLD-MCNC: 149 MG/DL (ref 65–99)
GLUCOSE UR STRIP.AUTO-MCNC: NEGATIVE MG/DL
KETONES UR STRIP.AUTO-MCNC: NEGATIVE MG/DL
LEUKOCYTE ESTERASE UR QL STRIP.AUTO: NEGATIVE
MICRO URNS: NORMAL
NITRITE UR QL STRIP.AUTO: NEGATIVE
PH UR STRIP.AUTO: 7.5 [PH]
PROT UR QL STRIP: NEGATIVE MG/DL
RBC UR QL AUTO: NEGATIVE
SP GR UR STRIP.AUTO: 1
UROBILINOGEN UR STRIP.AUTO-MCNC: 0.2 MG/DL

## 2017-11-11 PROCEDURE — 97116 GAIT TRAINING THERAPY: CPT

## 2017-11-11 PROCEDURE — 51798 US URINE CAPACITY MEASURE: CPT

## 2017-11-11 PROCEDURE — A9270 NON-COVERED ITEM OR SERVICE: HCPCS | Performed by: PHYSICIAN ASSISTANT

## 2017-11-11 PROCEDURE — 700112 HCHG RX REV CODE 229: Performed by: PHYSICIAN ASSISTANT

## 2017-11-11 PROCEDURE — 82962 GLUCOSE BLOOD TEST: CPT | Mod: 91

## 2017-11-11 PROCEDURE — 97530 THERAPEUTIC ACTIVITIES: CPT

## 2017-11-11 PROCEDURE — 770001 HCHG ROOM/CARE - MED/SURG/GYN PRIV*

## 2017-11-11 PROCEDURE — 97112 NEUROMUSCULAR REEDUCATION: CPT

## 2017-11-11 PROCEDURE — A9270 NON-COVERED ITEM OR SERVICE: HCPCS

## 2017-11-11 PROCEDURE — 700102 HCHG RX REV CODE 250 W/ 637 OVERRIDE(OP)

## 2017-11-11 PROCEDURE — 700102 HCHG RX REV CODE 250 W/ 637 OVERRIDE(OP): Performed by: PHYSICIAN ASSISTANT

## 2017-11-11 PROCEDURE — 81003 URINALYSIS AUTO W/O SCOPE: CPT

## 2017-11-11 RX ADMIN — VITAMIN D, TAB 1000IU (100/BT) 5000 UNITS: 25 TAB at 08:47

## 2017-11-11 RX ADMIN — STANDARDIZED SENNA CONCENTRATE AND DOCUSATE SODIUM 1 TABLET: 8.6; 5 TABLET, FILM COATED ORAL at 22:01

## 2017-11-11 RX ADMIN — DOCUSATE SODIUM 100 MG: 100 CAPSULE ORAL at 22:01

## 2017-11-11 RX ADMIN — DOCUSATE SODIUM 100 MG: 100 CAPSULE ORAL at 08:46

## 2017-11-11 RX ADMIN — OXYCODONE HYDROCHLORIDE AND ACETAMINOPHEN 2 TABLET: 7.5; 325 TABLET ORAL at 17:26

## 2017-11-11 RX ADMIN — HYDROCHLOROTHIAZIDE 12.5 MG: 25 TABLET ORAL at 08:47

## 2017-11-11 RX ADMIN — MAGNESIUM HYDROXIDE 30 ML: 400 SUSPENSION ORAL at 08:56

## 2017-11-11 RX ADMIN — HYDROCODONE BITARTRATE AND ACETAMINOPHEN 2 TABLET: 7.5; 325 TABLET ORAL at 15:38

## 2017-11-11 RX ADMIN — LOSARTAN POTASSIUM 50 MG: 50 TABLET, FILM COATED ORAL at 08:47

## 2017-11-11 RX ADMIN — ALPRAZOLAM 0.25 MG: 0.25 TABLET ORAL at 08:57

## 2017-11-11 RX ADMIN — OXYCODONE HYDROCHLORIDE AND ACETAMINOPHEN 2 TABLET: 7.5; 325 TABLET ORAL at 04:10

## 2017-11-11 RX ADMIN — METHOCARBAMOL 750 MG: 750 TABLET ORAL at 22:01

## 2017-11-11 RX ADMIN — OMEPRAZOLE 20 MG: 20 CAPSULE, DELAYED RELEASE ORAL at 08:47

## 2017-11-11 RX ADMIN — POLYETHYLENE GLYCOL 3350 1 PACKET: 17 POWDER, FOR SOLUTION ORAL at 04:17

## 2017-11-11 RX ADMIN — ANTACID TABLETS 500 MG: 500 TABLET, CHEWABLE ORAL at 08:47

## 2017-11-11 RX ADMIN — OXYCODONE HYDROCHLORIDE AND ACETAMINOPHEN 2 TABLET: 7.5; 325 TABLET ORAL at 12:17

## 2017-11-11 RX ADMIN — ANTACID TABLETS 500 MG: 500 TABLET, CHEWABLE ORAL at 22:01

## 2017-11-11 RX ADMIN — HYDROCODONE BITARTRATE AND ACETAMINOPHEN 2 TABLET: 7.5; 325 TABLET ORAL at 08:47

## 2017-11-11 RX ADMIN — BUDESONIDE AND FORMOTEROL FUMARATE DIHYDRATE 2 PUFF: 160; 4.5 AEROSOL RESPIRATORY (INHALATION) at 08:57

## 2017-11-11 ASSESSMENT — PAIN SCALES - GENERAL
PAINLEVEL_OUTOF10: 1
PAINLEVEL_OUTOF10: 4
PAINLEVEL_OUTOF10: 8
PAINLEVEL_OUTOF10: 2

## 2017-11-11 ASSESSMENT — GAIT ASSESSMENTS
DISTANCE (FEET): 30
ASSISTIVE DEVICE: FRONT WHEEL WALKER
GAIT LEVEL OF ASSIST: MINIMAL ASSIST

## 2017-11-11 NOTE — DISCHARGE SUMMARY
DATE OF ADMISSION:  11/08/2017    DATE OF TRANSFER:  11/11/2017    TRANSFER DIAGNOSES:  1.  Status post C3-T1 posterior cervical laminectomy and fusion.  2.  Cervical stenosis with myelopathy.    OPERATION PERFORMED:  See above.    REASON FOR ADMISSION:  This patient is a 72-year-old female who has been   struggling with neck and left arm symptoms for sometime.  She had stiffness in   the neck with ongoing neck pains.  She has extensive pain management with   persistent symptoms.  She had flexion contracture in the left hand on initial   consultation with somewhat thenar wasting.  She had an antalgic unsteady gait.    She had an MRI scan which showed multilevel cervical spondylosis with cord   compression.  She had an EMG which showed chronic radiculopathies.  Due to   these persistent symptoms with severe stenosis and clinical myelopathy on   exam, she was offered the above-mentioned procedure and she did consent.    HOSPITAL COURSE:  The patient underwent the above operation without any   complications and was transferred to the neurosurgery floor.  On postoperative   day #1, she was very slow to mobilize.  She had fairly extensive surgical   site pain.  Her motor and sensory exam in all extremities was intact.  Her   Hemovac drain was not ready to be discontinued.  She worked on pain   management, but did not mobilize on the first day.  On postoperative day #2,   she slowly began to mobilize.  Her pain was controlled with oral analgesia.    She was given a front wheel walker and did attempt some therapies.  She was   assessed for rehab at that time, but felt that she was not able to undergo the   physical therapy regimen ____ was recommended transfer to the skilled nursing   facility, which she agreed to.  On postoperative day #3, she continued to   make progress.  Her Hemovac drain was discontinued.  Her pain was more   controlled with oral analgesia.  She was eating and drinking without nausea or   vomiting and  voiding at that time. She was kept in hospital over the weekend   awaiting placement. She had a BM and on POD #5 She was clear for transfer  to SNF. At that time, it was determined she met the criteria for transfer and was   transferred to a skilled nursing facility.    DISCHARGE INSTRUCTIONS:  She is to be extremely cautious with any bending,   flexing, twisting about the neck.  She is to avoid nonsteroidal   anti-inflammatory medications.  She may shower at this time, but she is not to   submerge the wound.  She has followup appointments with our office in 2 and 6   weeks' time and she has been instructed to keep these.  She is to continue   with recommendations for care at the skilled nursing facility.    RECOMMENDED DISCHARGE MEDICATIONS:  1.  Xanax 0.25 mg p.o. b.i.d. p.r.n. for anxiety.  2.  Cepacol lozenge in mouth and throat every 2 hours p.r.n. for sore throat.  3.  Bisacodyl suppository 10 mg 1 per rectum q. 24 hours p.r.n.  4.  Budesonide/formoterol _____ Symbicort 160/4.5 mcg/act, _____ 2 puffs by   mouth b.i.d.  5.  Hydrochlorothiazide 12.5 mg 1 tab p.o. daily.  6.  Norco 7.5/325 mg 1-2 tabs p.o. q. 4 hours p.r.n. for pain.  7.  Sliding scale insulin.  8.  Labetalol 2 mg IV for systolic blood pressure of 160.  9.  Losartan 50 mg 1 tab p.o. daily.  10.  Methocarbamol 750 mg 1 tab p.o. q. 8 hours p.r.n. for spasms.  11.  Zofran 2-4 mg p.o. or IV for nausea.  12.  Percocet 7.5/325 mg 1-2 tabs p.o. q. 4 hours p.r.n. for pain.  If Norco   ineffective.  13.  Senna docusate 1 tab p.o. daily.  14.  Fluticasone/salmeterol 250/50 inhale 1 puff by mouth b.i.d.  15.  Losartan/hydrochlorothiazide 50/12.5 mg 1 tab p.o. daily.  16.  Pantoprazole 50 mg 1 tab p.o. daily.    I once again have answered all of her questions to best of my ability.  She is   now stable for transfer.  We will follow up with her as an outpatient.       ____________________________________     GEOVANY [place] / CONNOR    DD:   11/10/2017 16:30:59  DT:  11/10/2017 16:57:21    D#:  [id number]  Job#:  [id number]    cc: Jasbir Rodriguez MD

## 2017-11-11 NOTE — CARE PLAN
Problem: Safety  Goal: Will remain free from falls  Outcome: PROGRESSING AS EXPECTED  Pt educated on using call bell for assistance, treaded socks in place and bed alarm on.     Problem: Pain Management  Goal: Pain level will decrease to patient's comfort goal  Outcome: PROGRESSING AS EXPECTED

## 2017-11-11 NOTE — CARE PLAN
Problem: Venous Thromboembolism (VTW)/Deep Vein Thrombosis (DVT) Prevention:  Goal: Patient will participate in Venous Thrombosis (VTE)/Deep Vein Thrombosis (DVT)Prevention Measures  Outcome: PROGRESSING AS EXPECTED  SCD's in place.    Problem: Discharge Barriers/Planning  Goal: Patient's continuum of care needs will be met  Outcome: PROGRESSING AS EXPECTED  D/c to snf pending hemovac output.    Problem: Pain Management  Goal: Pain level will decrease to patient's comfort goal  Outcome: PROGRESSING AS EXPECTED  Prn percoset and flexeril managing pt.'s reported moderate posterior neck pain. Pt. Resting comfortably after intervention

## 2017-11-11 NOTE — PROGRESS NOTES
Neurosurgery Progress Note    Subjective:  POD #3  C/o pulling pain nto right side of neck since drain removed  Otherwise no other pain  States she forgets she has parts of her body??  Slow to mobilize  Collar on      Exam:  Limited due to pain- pt did not want to move  Wound C/D/I  MCKEON  Trace weakness throughout exam  -poor effort  Labs stable  VSS  Drain: 40cc    BP  Min: 124/68  Max: 148/74  Pulse  Av.5  Min: 75  Max: 92  Resp  Av  Min: 16  Max: 16  Temp  Av.3 °C (97.4 °F)  Min: 36.1 °C (96.9 °F)  Max: 36.6 °C (97.9 °F)  SpO2  Av.8 %  Min: 95 %  Max: 99 %    No Data Recorded    Recent Labs      17   0505  11/10/17   0355   WBC  7.6  6.9   RBC  3.89*  3.68*   HEMOGLOBIN  12.2  11.5*   HEMATOCRIT  36.2*  34.0*   MCV  93.1  92.4   MCH  31.4  31.3   MCHC  33.7  33.8   RDW  41.8  42.1   PLATELETCT  257  245   MPV  10.8  11.1     Recent Labs      17   0505  11/10/17   0355   SODIUM  139  137   POTASSIUM  4.0  3.9   CHLORIDE  106  107   CO2  25  26   GLUCOSE  144*  124*   BUN  18  11               Intake/Output       11/10/17 0700 - 17 0659 17 07 - 17 0659      9319-7781 0667-4379 Total 1900-0659 Total       Intake    I.V.  --  1100 1100  --  -- --    IV Volume (< Enter Name Here >) -- 1100 1100 -- -- --    Total Intake -- 1100 1100 -- -- --       Output    Urine  1000  525 1525  --  -- --    Number of Times Voided 10 x 8 x 18 x -- -- --    Indwelling Cathether 1000 -- 1000 -- -- --    Void (ml) -- 525 525 -- -- --    Drains  50  85 135  --  -- --    Hemovac 1 50 85 135 -- -- --    Total Output 7153 145 9899 -- -- --       Net I/O     -1050 490 -448 -- -- --            Intake/Output Summary (Last 24 hours) at 17 1033  Last data filed at 17 0600   Gross per 24 hour   Intake             1100 ml   Output              660 ml   Net              440 ml       Bladder Scan Results (ml): 120    • insulin lispro  1-6 Units 4X/DAY ACHS   • ondansetron  4 mg  Q4HRS PRN   • metoclopramide  10 mg PC PRN + HS PRN   • ketorolac  30 mg Q6HRS PRN   • hydrocodone-acetaminophen  1-2 Tab Q6HRS PRN   • oxycodone-acetaminophen  1-2 Tab Q4HRS PRN   • On-Q Pump - Ropivacaine 270 mL (fixed-flow rate, dual-lumen)   Continuous   • NS   Continuous   • metformin ER  500 mg DAILY   • Pharmacy Consult Request  1 Each PRN   • MD ALERT...Do not administer NSAIDS or ASPIRIN unless ORDERED By Neurosurgery  1 Each PRN   • docusate sodium  100 mg BID   • senna-docusate  1 Tab Nightly   • senna-docusate  1 Tab Q24HRS PRN   • polyethylene glycol/lytes  1 Packet BID PRN   • magnesium hydroxide  30 mL QDAY PRN   • bisacodyl  10 mg Q24HRS PRN   • fleet  1 Each Once PRN   • 0.9 % NaCl with KCl 20 mEq 1,000 mL   Continuous   • methocarbamol  750 mg Q8HRS PRN   • alprazolam  0.25 mg BID PRN   • labetalol  10 mg Q HOUR PRN   • benzocaine-menthol  1 Lozenge Q2HRS PRN   • calcium carbonate  500 mg BID   • vitamin D  5,000 Units DAILY   • glucose 4 g  16 g Q15 MIN PRN    And   • dextrose 50%  25 mL Q15 MIN PRN   • omeprazole  20 mg QAM   • budesonide-formoterol  2 Puff BID (RT)   • losartan  50 mg Q DAY   • hydrochlorothiazide  12.5 mg Q DAY       Assessment and Plan:  Hospital day #3  POD #3  Prophylactic anticoagulation: no    Plan:  1. Mobilize with PT/OT  2. Zofran for nausea-reglan also charted  4. Orals for pain medication  5. Monitor drain  6. SNF when drain out  7. Give muscle relaxer

## 2017-11-11 NOTE — THERAPY
"Physical Therapy Treatment completed.   Bed Mobility:  Supine to Sit: Minimal Assist (flat bed with rail )  Transfers: Sit to Stand: Contact Guard Assist  Gait: Level Of Assist: Minimal Assist with Front-Wheel Walker       Plan of Care: Will benefit from Physical Therapy 4 times per week  Discharge Recommendations: Equipment: Will Continue to Assess for Equipment Needs. Post-acute therapy Discharge to a transitional care facility for continued skilled therapy services.     See \"Rehab Therapy-Acute\" Patient Summary Report for complete documentation.       "

## 2017-11-12 LAB
GLUCOSE BLD-MCNC: 113 MG/DL (ref 65–99)
GLUCOSE BLD-MCNC: 132 MG/DL (ref 65–99)
GLUCOSE BLD-MCNC: 136 MG/DL (ref 65–99)
GLUCOSE BLD-MCNC: 153 MG/DL (ref 65–99)

## 2017-11-12 PROCEDURE — 82962 GLUCOSE BLOOD TEST: CPT | Mod: 91

## 2017-11-12 PROCEDURE — 700101 HCHG RX REV CODE 250: Performed by: PHYSICIAN ASSISTANT

## 2017-11-12 PROCEDURE — 700102 HCHG RX REV CODE 250 W/ 637 OVERRIDE(OP)

## 2017-11-12 PROCEDURE — 700112 HCHG RX REV CODE 229: Performed by: PHYSICIAN ASSISTANT

## 2017-11-12 PROCEDURE — A9270 NON-COVERED ITEM OR SERVICE: HCPCS | Performed by: PHYSICIAN ASSISTANT

## 2017-11-12 PROCEDURE — 770001 HCHG ROOM/CARE - MED/SURG/GYN PRIV*

## 2017-11-12 PROCEDURE — 700102 HCHG RX REV CODE 250 W/ 637 OVERRIDE(OP): Performed by: PHYSICIAN ASSISTANT

## 2017-11-12 PROCEDURE — A9270 NON-COVERED ITEM OR SERVICE: HCPCS

## 2017-11-12 RX ORDER — OXYCODONE AND ACETAMINOPHEN 10; 325 MG/1; MG/1
1-2 TABLET ORAL EVERY 4 HOURS PRN
Status: DISCONTINUED | OUTPATIENT
Start: 2017-11-12 | End: 2017-11-13 | Stop reason: HOSPADM

## 2017-11-12 RX ADMIN — DOCUSATE SODIUM 100 MG: 100 CAPSULE ORAL at 21:26

## 2017-11-12 RX ADMIN — STANDARDIZED SENNA CONCENTRATE AND DOCUSATE SODIUM 1 TABLET: 8.6; 5 TABLET, FILM COATED ORAL at 21:26

## 2017-11-12 RX ADMIN — DOCUSATE SODIUM 100 MG: 100 CAPSULE ORAL at 08:40

## 2017-11-12 RX ADMIN — HYDROCHLOROTHIAZIDE 12.5 MG: 25 TABLET ORAL at 08:40

## 2017-11-12 RX ADMIN — OXYCODONE HYDROCHLORIDE AND ACETAMINOPHEN 2 TABLET: 7.5; 325 TABLET ORAL at 08:46

## 2017-11-12 RX ADMIN — METFORMIN HYDROCHLORIDE 500 MG: 500 TABLET, EXTENDED RELEASE ORAL at 08:40

## 2017-11-12 RX ADMIN — OXYCODONE HYDROCHLORIDE AND ACETAMINOPHEN 2 TABLET: 7.5; 325 TABLET ORAL at 03:17

## 2017-11-12 RX ADMIN — METHOCARBAMOL 750 MG: 750 TABLET ORAL at 06:47

## 2017-11-12 RX ADMIN — SODIUM PHOSPHATE 133 ML: 7; 19 ENEMA RECTAL at 14:17

## 2017-11-12 RX ADMIN — METHOCARBAMOL 750 MG: 750 TABLET ORAL at 21:47

## 2017-11-12 RX ADMIN — ANTACID TABLETS 500 MG: 500 TABLET, CHEWABLE ORAL at 21:26

## 2017-11-12 RX ADMIN — OXYCODONE HYDROCHLORIDE AND ACETAMINOPHEN 1 TABLET: 10; 325 TABLET ORAL at 21:47

## 2017-11-12 RX ADMIN — HYDROCODONE BITARTRATE AND ACETAMINOPHEN 2 TABLET: 7.5; 325 TABLET ORAL at 06:47

## 2017-11-12 RX ADMIN — OXYCODONE HYDROCHLORIDE AND ACETAMINOPHEN 1 TABLET: 10; 325 TABLET ORAL at 17:24

## 2017-11-12 RX ADMIN — VITAMIN D, TAB 1000IU (100/BT) 5000 UNITS: 25 TAB at 08:40

## 2017-11-12 RX ADMIN — LOSARTAN POTASSIUM 50 MG: 50 TABLET, FILM COATED ORAL at 08:40

## 2017-11-12 RX ADMIN — BISACODYL 10 MG: 10 SUPPOSITORY RECTAL at 05:07

## 2017-11-12 RX ADMIN — BUDESONIDE AND FORMOTEROL FUMARATE DIHYDRATE 2 PUFF: 160; 4.5 AEROSOL RESPIRATORY (INHALATION) at 08:41

## 2017-11-12 RX ADMIN — ANTACID TABLETS 500 MG: 500 TABLET, CHEWABLE ORAL at 08:40

## 2017-11-12 RX ADMIN — OMEPRAZOLE 20 MG: 20 CAPSULE, DELAYED RELEASE ORAL at 08:40

## 2017-11-12 ASSESSMENT — PAIN SCALES - GENERAL
PAINLEVEL_OUTOF10: 7
PAINLEVEL_OUTOF10: 3
PAINLEVEL_OUTOF10: 6
PAINLEVEL_OUTOF10: 8
PAINLEVEL_OUTOF10: 3
PAINLEVEL_OUTOF10: 2
PAINLEVEL_OUTOF10: 4
PAINLEVEL_OUTOF10: 1
PAINLEVEL_OUTOF10: 4
PAINLEVEL_OUTOF10: 5

## 2017-11-12 NOTE — PROGRESS NOTES
Received report from night RN at bedside. Pt shows no signs of pain or distress. Bed in low position call bell within reach half side rails up. Pt resting quietly. Will continue to assess. Aspen collar in place

## 2017-11-12 NOTE — DISCHARGE PLANNING
Medical SW    Referral: RN intially indicates pt can d/c to accepting SNF [facility]. She can probably go via w/c van.    Intervention: Later Sw advised by RN pt has not had BM and to cancel d/c until Monday.     Plan: Sw to assist w/ d/c planning as needed.

## 2017-11-12 NOTE — PROGRESS NOTES
Spoke w/ LINDA Stack about the bladder scan residual which was 220 and that the pt was voiding q 30 minutes. She said she did not want her to be straight cathed until residual was >500

## 2017-11-12 NOTE — PROGRESS NOTES
"Neurosurgery  POD# 4  Ambulating  Voiding - frequently, UA was clean  Tolerating oral medications  Denies nausea, vomiting, headache  Pain controlled on current medication regimen  Neck pain improving - some achiness in her bilateral forearms  Hydrocodone and Percocet given Q 2 hours overnight  No BM, t/f to SNF held d/t no BM, suppository given this morning    Objective:  Blood pressure 136/83, pulse 84, temperature 36.6 °C (97.8 °F), resp. rate 16, height 1.651 m (5' 5\"), weight 56.2 kg (123 lb 14.4 oz), SpO2 94 %.    Intake/Output Summary (Last 24 hours) at 11/12/17 0935  Last data filed at 11/12/17 0600   Gross per 24 hour   Intake              240 ml   Output               65 ml   Net              175 ml       Recent Labs      11/10/17   0355   WBC  6.9   RBC  3.68*   HEMOGLOBIN  11.5*   HEMATOCRIT  34.0*   MCV  92.4   MCH  31.3   MCHC  33.8   RDW  42.1   PLATELETCT  245   MPV  11.1     Recent Labs      11/10/17   0355   SODIUM  137   POTASSIUM  3.9   CHLORIDE  107   CO2  26   GLUCOSE  124*   BUN  11         VSS  UA negative  Surgical incision clean, dry, intact, no evidence of infection  Strength:  Trace weakness in triceps bilaterally, 4/5, finger extensors 4/5, otherwise upper extremities are 5/5 grossly  Otherwise neurologically intact    Assessment:  There are no active hospital problems to display for this patient.        Plan:  1. Suppository and fleets enema for BM  2. SNF when accepted  3. No other changes per NSX    "

## 2017-11-12 NOTE — CARE PLAN
Problem: Safety  Goal: Will remain free from falls  Outcome: PROGRESSING AS EXPECTED  Assessed the patient for fall risk factors. Provided education regarding the need to call for assistance. Bed alarm in place, bed in lowest position, call light in reach.      Problem: Pain Management  Goal: Pain level will decrease to patient's comfort goal  Outcome: PROGRESSING AS EXPECTED  Discussed pain management POC with pt.  Assessed pain Q2hr.  Administered oxycodone PRN.

## 2017-11-12 NOTE — PROGRESS NOTES
Pt is AAOx4.  Moves all extremities 4/5.  Complains of tingling in right thumb and index finger.  Up with 1 person assist, unsteady gait.  Pt complains of 1/10 pain.  Pt denies nausea/vomiting.  Tolerating diabetic diet.  Pt voiding without difficulty.  Hemovac in place, compressed.  Surgical incision/dressing CDI.  POC discussed, pt verbalizes understanding.  Bed alarm on.  Call light within reach, bed in lowest position.

## 2017-11-13 VITALS
RESPIRATION RATE: 16 BRPM | WEIGHT: 123.9 LBS | SYSTOLIC BLOOD PRESSURE: 98 MMHG | TEMPERATURE: 98.7 F | HEART RATE: 109 BPM | DIASTOLIC BLOOD PRESSURE: 60 MMHG | OXYGEN SATURATION: 95 % | HEIGHT: 65 IN | BODY MASS INDEX: 20.64 KG/M2

## 2017-11-13 LAB
GLUCOSE BLD-MCNC: 148 MG/DL (ref 65–99)
GLUCOSE BLD-MCNC: 158 MG/DL (ref 65–99)

## 2017-11-13 PROCEDURE — A9270 NON-COVERED ITEM OR SERVICE: HCPCS | Performed by: PHYSICIAN ASSISTANT

## 2017-11-13 PROCEDURE — A9270 NON-COVERED ITEM OR SERVICE: HCPCS

## 2017-11-13 PROCEDURE — 700102 HCHG RX REV CODE 250 W/ 637 OVERRIDE(OP): Performed by: PHYSICIAN ASSISTANT

## 2017-11-13 PROCEDURE — 82962 GLUCOSE BLOOD TEST: CPT

## 2017-11-13 PROCEDURE — 700112 HCHG RX REV CODE 229: Performed by: PHYSICIAN ASSISTANT

## 2017-11-13 PROCEDURE — 700111 HCHG RX REV CODE 636 W/ 250 OVERRIDE (IP): Performed by: PHYSICIAN ASSISTANT

## 2017-11-13 PROCEDURE — 700102 HCHG RX REV CODE 250 W/ 637 OVERRIDE(OP)

## 2017-11-13 RX ADMIN — DOCUSATE SODIUM 100 MG: 100 CAPSULE ORAL at 07:45

## 2017-11-13 RX ADMIN — VITAMIN D, TAB 1000IU (100/BT) 5000 UNITS: 25 TAB at 07:45

## 2017-11-13 RX ADMIN — ANTACID TABLETS 500 MG: 500 TABLET, CHEWABLE ORAL at 07:45

## 2017-11-13 RX ADMIN — ONDANSETRON 4 MG: 2 INJECTION, SOLUTION INTRAMUSCULAR; INTRAVENOUS at 03:27

## 2017-11-13 RX ADMIN — BUDESONIDE AND FORMOTEROL FUMARATE DIHYDRATE 2 PUFF: 160; 4.5 AEROSOL RESPIRATORY (INHALATION) at 07:45

## 2017-11-13 RX ADMIN — HYDROCHLOROTHIAZIDE 12.5 MG: 25 TABLET ORAL at 07:45

## 2017-11-13 RX ADMIN — OMEPRAZOLE 20 MG: 20 CAPSULE, DELAYED RELEASE ORAL at 07:45

## 2017-11-13 RX ADMIN — LOSARTAN POTASSIUM 50 MG: 50 TABLET, FILM COATED ORAL at 07:45

## 2017-11-13 RX ADMIN — OXYCODONE HYDROCHLORIDE AND ACETAMINOPHEN 2 TABLET: 10; 325 TABLET ORAL at 12:58

## 2017-11-13 RX ADMIN — METFORMIN HYDROCHLORIDE 500 MG: 500 TABLET, EXTENDED RELEASE ORAL at 07:49

## 2017-11-13 RX ADMIN — OXYCODONE HYDROCHLORIDE AND ACETAMINOPHEN 2 TABLET: 10; 325 TABLET ORAL at 08:55

## 2017-11-13 ASSESSMENT — PAIN SCALES - GENERAL: PAINLEVEL_OUTOF10: 9

## 2017-11-13 NOTE — CARE PLAN
Problem: Safety  Goal: Will remain free from falls  Outcome: PROGRESSING AS EXPECTED      Problem: Infection  Goal: Will remain free from infection  Outcome: PROGRESSING AS EXPECTED      Problem: Venous Thromboembolism (VTW)/Deep Vein Thrombosis (DVT) Prevention:  Goal: Patient will participate in Venous Thrombosis (VTE)/Deep Vein Thrombosis (DVT)Prevention Measures  Outcome: PROGRESSING AS EXPECTED

## 2017-11-13 NOTE — PROGRESS NOTES
"Neurosurgery  POD# 5  Ambulating  +BM yesterday  Voiding   Tolerating oral medications  Denies nausea, vomiting, headache  Pain controlled on current medication regimen  Neck pain improving - some achiness in her bilateral forearms  Hydrocodone and Percocet given Q 2 hours overnight  No BM, t/f to SNF held d/t no BM, suppository given this morning    Objective:  Blood pressure 104/61, pulse (!) 103, temperature 36.6 °C (97.8 °F), resp. rate 16, height 1.651 m (5' 5\"), weight 56.2 kg (123 lb 14.4 oz), SpO2 94 %.    Intake/Output Summary (Last 24 hours) at 11/12/17 0935  Last data filed at 11/12/17 0600   Gross per 24 hour   Intake              240 ml   Output               65 ml   Net              175 ml           No results for input(s): SODIUM, POTASSIUM, CHLORIDE, CO2, GLUCOSE, BUN, CPKTOTAL in the last 72 hours.      VSS  UA negative  Surgical incision clean, dry, intact, no evidence of infection  Strength:  Trace weakness in triceps bilaterally, 4/5, finger extensors 4/5, otherwise upper extremities are 5/5 grossly  Otherwise neurologically intact    Assessment:  There are no active hospital problems to display for this patient.        Plan:  1. OK to t/f to SNF  2. SNF when accepted  3. No other changes per NSX    "

## 2017-11-13 NOTE — PROGRESS NOTES
Pt is AAOx4.  Moves all extremities 4/5.  Complains of tingling in right thumb and index finger.  Up with 1 person assist, unsteady gait.  Pt complains of 7/10 pain.  Oxycodone given PRN with positive results.  Pt denies nausea/vomiting.  Tolerating diabetic diet.  Pt voiding without difficulty, but experiencing frequency.  Surgical incision/dressing CDI.  POC discussed, pt verbalizes understanding.  Bed alarm on.  Call light within reach, bed in lowest position.

## 2017-11-13 NOTE — DISCHARGE INSTRUCTIONS
Discharge Instructions    Discharged to [facility] by medical transportation with relative. Discharged via wheelchair, hospital escort: Yes.  Special equipment needed: C-Collar    Be sure to schedule a follow-up appointment with your primary care doctor or any specialists as instructed.     Discharge Plan:   Influenza Vaccine Indication: Patient Refuses    I understand that a diet low in cholesterol, fat, and sodium is recommended for good health. Unless I have been given specific instructions below for another diet, I accept this instruction as my diet prescription.   Other diet: Regular    Special Instructions: None    · Is patient discharged on Warfarin / Coumadin?   No     · Is patient Post Blood Transfusion?  No    Depression / Suicide Risk    As you are discharged from this [facility] facility, it is important to learn how to keep safe from harming yourself.    Recognize the warning signs:  · Abrupt changes in personality, positive or negative- including increase in energy   · Giving away possessions  · Change in eating patterns- significant weight changes-  positive or negative  · Change in sleeping patterns- unable to sleep or sleeping all the time   · Unwillingness or inability to communicate  · Depression  · Unusual sadness, discouragement and loneliness  · Talk of wanting to die  · Neglect of personal appearance   · Rebelliousness- reckless behavior  · Withdrawal from people/activities they love  · Confusion- inability to concentrate     If you or a loved one observes any of these behaviors or has concerns about self-harm, here's what you can do:  · Talk about it- your feelings and reasons for harming yourself  · Remove any means that you might use to hurt yourself (examples: pills, rope, extension cords, firearm)  · Get professional help from the community (Mental Health, Substance Abuse, psychological counseling)  · Do not be alone:Call your Safe Contact- someone whom you trust who will be  there for you.  · Call your local CRISIS HOTLINE 810-2604 or 615-772-6879  · Call your local Children's Mobile Crisis Response Team [place] (537) 609-8834 or www.[employer]  · Call the toll free National Suicide Prevention Hotlines   · National Suicide Prevention Lifeline 359-377-QLRX (0592)  · National [employer] Line Network 800-SUICIDE (435-7767)

## 2017-11-13 NOTE — DISCHARGE PLANNING
Received transport form from Sadie([facility]). Arranged patient's transport to [facility] at 1330 via [facility] transport. Sadie([facility]) notified of transport time via phone.

## 2017-11-13 NOTE — DISCHARGE PLANNING
Medical Social Work    Pt has been medically cleared for transfer. Pt has been accepted to [facility] by Dr. Ferreira. [facility] called [facility] and set up transport through [facility] for 1330. Pt was updated and agreeable to transfer. SW updated pt and pt signed the cobra.  SW notified bedside RN and charge, and completed the transfer packet.

## 2017-11-30 ENCOUNTER — HOSPITAL ENCOUNTER (INPATIENT)
Facility: REHABILITATION | Age: 72
End: 2017-11-30
Attending: PHYSICAL MEDICINE & REHABILITATION | Admitting: PHYSICAL MEDICINE & REHABILITATION
Payer: MEDICARE

## 2017-12-14 ENCOUNTER — HOSPITAL ENCOUNTER (OUTPATIENT)
Dept: RADIOLOGY | Facility: MEDICAL CENTER | Age: 72
End: 2017-12-14
Attending: PHYSICIAN ASSISTANT
Payer: MEDICARE

## 2017-12-14 DIAGNOSIS — M47.22 CERVICAL SPONDYLOSIS WITH RADICULOPATHY: ICD-10-CM

## 2017-12-14 PROCEDURE — 72050 X-RAY EXAM NECK SPINE 4/5VWS: CPT

## 2018-01-08 ENCOUNTER — APPOINTMENT (OUTPATIENT)
Dept: ENDOCRINOLOGY | Facility: MEDICAL CENTER | Age: 73
End: 2018-01-08
Payer: MEDICARE

## 2018-02-05 ENCOUNTER — OFFICE VISIT (OUTPATIENT)
Dept: ENDOCRINOLOGY | Facility: MEDICAL CENTER | Age: 73
End: 2018-02-05
Payer: MEDICARE

## 2018-02-05 VITALS
OXYGEN SATURATION: 97 % | RESPIRATION RATE: 12 BRPM | WEIGHT: 123 LBS | BODY MASS INDEX: 20.49 KG/M2 | HEART RATE: 116 BPM | DIASTOLIC BLOOD PRESSURE: 70 MMHG | HEIGHT: 65 IN | SYSTOLIC BLOOD PRESSURE: 122 MMHG

## 2018-02-05 DIAGNOSIS — E11.00 UNCONTROLLED TYPE 2 DIABETES MELLITUS WITH HYPEROSMOLARITY WITHOUT COMA, WITHOUT LONG-TERM CURRENT USE OF INSULIN (HCC): ICD-10-CM

## 2018-02-05 LAB
HBA1C MFR BLD: 7.4 % (ref ?–5.8)
INT CON NEG: NEGATIVE
INT CON POS: POSITIVE

## 2018-02-05 PROCEDURE — 99213 OFFICE O/P EST LOW 20 MIN: CPT | Performed by: PHYSICIAN ASSISTANT

## 2018-02-05 PROCEDURE — 83036 HEMOGLOBIN GLYCOSYLATED A1C: CPT | Performed by: PHYSICIAN ASSISTANT

## 2018-02-05 RX ORDER — METFORMIN HYDROCHLORIDE 500 MG/1
1000 TABLET, EXTENDED RELEASE ORAL 2 TIMES DAILY
Qty: 360 TAB | Refills: 3 | Status: SHIPPED | OUTPATIENT
Start: 2018-02-05 | End: 2018-08-13

## 2018-02-05 RX ORDER — BLOOD-GLUCOSE METER
1 EACH MISCELLANEOUS
Qty: 1 KIT | Refills: 1 | Status: SHIPPED | OUTPATIENT
Start: 2018-02-05 | End: 2018-08-13

## 2018-02-05 NOTE — PATIENT INSTRUCTIONS
She  is now on:  1.  Trulicity 1.5 once a week (Take on Monday's)  2.  Metformin 500mg ER one in AM one in PM  Needs to check BG 4 times a week

## 2018-02-05 NOTE — PROGRESS NOTES
Return to office Patient Consult Note  Referred by: Parveen Barahona M.D.    Reason for consult: Diabetes Management Type 2    HPI:  Margy Boyer is a 72 y.o. old patient who is seeing us today for diabetes care.  This is a pleasant patient with diabetes and I appreciate the opportunity to participate in the care of this patient.    BG Diary:9/25/2017  In the AM: 128, 126, 96, 99,  Before meal: 102, 144, 149, 104  Before Bed: 131, 143, 143, 129      BG Diary:9/11/2017  In the AM: 145, 146, 44, 46, 153, 167, 171     BG Diary:8/7/2017  In the AM: 124, 127, 117, 118, 95, 123, 111, 113, 119, 103  Before meal: 122, 109, 108, 96, 112, 75, 113, 94   Before Bed: 105, 232, 122, 94, 112, 106, 122, 190, 102, 130       BG Diary:7/3/2017  In the AM: 138, 136, 130, 142, 132, 112, 157, 151, 145, 143, 137  Before Bed: 164, 213, 142, 128, 161, 188, 176, 144, 144, 192     BG Diary:5/3/2017  In the AM: 140, 120, 106, 98, 104,    Before meal: 133, 95, 188, 134    Before Bed: 127, 113, 134, 105, 160      2/5/18 HbA1c is 7.4  8/7/17 HbA1c is 7.2  2/12/17 HbA1c 12.9,  GFR 59  8/7/17 HbA1c is 7.2     8/25/17 GFR 54     BG Diary:4/25/2017  In the AM: 135, 116, 123, 115, 142, 132, 124  Just before meal: 137, 216, 179, 59, 120, 138,    Just before Bed: 150, 280, 207, 218, 193, 174       Weight:She was 139 on 4/25/17 and today is 125        1. Type 2 diabetes mellitus with hyperlipidemia (CMS-HCC)     She  is now on:  1.  Trulicity 1.5 once a week (Take on Monday's)  2.  Metformin 500mg ER one in AM one in PM     She is doing very well on this combination.  No nausea or Diarrhea.  We tried several medications before this combo for her.       ROS:   Constitutional: No night sweats.  Eyes:  No visual changes.  Cardiac: No chest pain, No palpitations or racing heart rate.  Resp: No shortness of breath, No cough,   Gi: No Diarrhea    All other systems were reviewed and were/are negative.  The ROS was revised/revisited during this office visit  from the patients first office visit with me on 4/25/17Please review the full ROS during the first office visit.    Past Medical History:  Patient Active Problem List    Diagnosis Date Noted   • Diabetes mellitus type II, uncontrolled (CMS-HCC) 04/25/2017   • Type 2 diabetes mellitus with hyperlipidemia (CMS-HCC) 04/25/2017   • Chronic low back pain 09/29/2016   • DDD (degenerative disc disease), lumbar 09/29/2016   • Spondylosis of lumbosacral region without myelopathy or radiculopathy 09/29/2016   • Chronic neck pain 09/29/2016   • Osteoarthritis of spine with radiculopathy, cervical region 09/29/2016   • DDD (degenerative disc disease), cervical 09/29/2016   • Muscle weakness of left upper extremity 09/29/2016   • Left hand pain 06/10/2015   • Contracture of joint of hand 01/05/2015   • Fracture of metacarpal bone 12/23/2013       Past Surgical History:  Past Surgical History:   Procedure Laterality Date   • POSTERIOR CERVICAL FUSION O-ARM  11/8/2017    Procedure: CERVICAL FUSION POSTERIOR O-ARM C3-T1 INSTRUMENTED;  Surgeon: Fabiola Ferreira M.D.;  Location: [facility];  Service: Neurosurgery   • CERVICAL LAMINECTOMY POSTERIOR  11/8/2017    Procedure: CERVICAL LAMINECTOMY POSTERIOR  C3-T1 DECOMPRESSIVE;  Surgeon: Fabiola Ferreira M.D.;  Location: [facility];  Service: Neurosurgery   • FORAMINOTOMY Bilateral 11/8/2017    Procedure: FORAMINOTOMY;  Surgeon: Fabiola Ferreira M.D.;  Location: [facility];  Service: Neurosurgery   • OTHER ORTHOPEDIC SURGERY  04/2013    left hand ORIF   • OTHER  1994    lumph nodes removed from rt axilla   • DILATION AND CURETTAGE  1971    for partial miscarriage   • GYN SURGERY      tubal ligation   • OTHER ORTHOPEDIC SURGERY  ?2012    Right knee arthroscopy   • OTHER ORTHOPEDIC SURGERY  2012/1974    bilat bunion removal       Allergies:  Aluminum; Asa [aspirin]; Celecoxib; Codeine; Other food; Pcn [penicillins]; Soap; Tetracycline; and  Vioxx    Social History:  Social History     Social History   • Marital status: Single     Spouse name: N/A   • Number of children: N/A   • Years of education: N/A     Occupational History   • Not on file.     Social History Main Topics   • Smoking status: Never Smoker   • Smokeless tobacco: Never Used   • Alcohol use No   • Drug use: No   • Sexual activity: Not on file     Other Topics Concern   • Not on file     Social History Narrative   • No narrative on file       Family History:  No family history on file.    Medications:    Current Outpatient Prescriptions:   •  Blood Glucose Monitoring Suppl (ONETOUCH VERIO) w/Device Kit, 1 Device by Does not apply route 3 times a day before meals., Disp: 1 Kit, Rfl: 1  •  glucose blood (ONETOUCH VERIO) strip, 1 Strip by Other route 2 times daily with meals as needed (on insulin checking 3-4 times a day)., Disp: 50 Strip, Rfl: 11  •  senna-docusate (PERICOLACE OR SENOKOT S) 8.6-50 MG Tab, Take 1 Tab by mouth every day., Disp: 30 Tab, Rfl: 0  •  bisacodyl (DULCOLAX) 10 MG Suppos, Insert 1 Suppository in rectum every 24 hours as needed (if sennosides, docusate, polyethylene glycol 3350, and/or magnesium hydroxide ineffective or not ordered)., Disp: , Rfl: 0  •  methocarbamol (ROBAXIN) 750 MG Tab, Take 1 Tab by mouth every 8 hours as needed., Disp: 120 Tab, Rfl:   •  labetalol (NORMODYNE,TRANDATE) 5 MG/ML Solution, 2 mL by Intravenous route every 1 hour as needed (SBP over 160 mmHg.  Hold for HR less than 55.)., Disp: , Rfl: 0  •  benzocaine-menthol (CEPACOL) 15-3.6 MG Lozenge, Spray 1 Lozenge in mouth/throat every 2 hours as needed (for sore throat)., Disp: , Rfl:   •  vitamin D 5000 units Tab, Take 5,000 Units by mouth every day., Disp: 60 Tab, Rfl:   •  budesonide-formoterol (SYMBICORT) 160-4.5 MCG/ACT Aerosol, Inhale 2 Puffs by mouth 2 Times a Day., Disp: , Rfl:   •  losartan (COZAAR) 50 MG Tab, Take 1 Tab by mouth every day., Disp: 30 Tab, Rfl:   •  hydrochlorothiazide  "(HYDRODIURIL) 12.5 MG tablet, Take 1 Tab by mouth every day., Disp: 30 Tab, Rfl:   •  insulin lispro (HUMALOG) 100 UNIT/ML Solution, Inject 1-6 Units as instructed 4 Times a Day,Before Meals and at Bedtime., Disp: 10 mL, Rfl:   •  ondansetron (ZOFRAN) 4 MG/2ML Solution injection, 2 mL by Intravenous route every four hours as needed for Nausea., Disp: 15 mL, Rfl:   •  hydrocodone-acetaminophen (NORCO) 7.5-325 MG per tablet, Take 1-2 Tabs by mouth every 6 hours as needed., Disp: 20 Tab, Rfl: 0  •  oxycodone-acetaminophen (PERCOCET) 7.5-325 MG per tablet, Take 1-2 Tabs by mouth every four hours as needed for Moderate Pain or Severe Pain., Disp: 30 Tab, Rfl: 0  •  alprazolam (XANAX) 0.25 MG Tab, Take 1 Tab by mouth 2 times a day as needed for Anxiety., Disp: 30 Tab, Rfl: 0  •  losartan-hydrochlorothiazide (HYZAAR) 50-12.5 MG per tablet, Take 1 Tab by mouth every day., Disp: , Rfl:   •  fluticasone-salmeterol (ADVAIR DISKUS) 250-50 MCG/DOSE AEROSOL POWDER, BREATH ACTIVATED, Inhale 1 Puff by mouth 2 times a day as needed., Disp: , Rfl:   •  pantoprazole (PROTONIX) 40 MG Tablet Delayed Response, Take 40 mg by mouth every morning., Disp: , Rfl:         Physical Examination:   Vital signs: /70   Pulse (!) 116   Resp 12   Ht 1.651 m (5' 5\")   Wt 55.8 kg (123 lb)   SpO2 97%   BMI 20.47 kg/m²   General: No distress, cooperative, well dressed and well nourished.   Eyes: No scleral icterus or discharge, No hyposphagma  ENMT: Normal on external inspection of nose, lips, No nasal drainage   Neck: No abnormal masses on inspection  Resp: Normal effort, Bilateral clear to auscultation, No wheezing, No rales  CVS: Regular rate and rhythm, S1 S2 normal, No murmur. No gallop  Extremities: No edema bilateral extremities  Neuro: Alert and oriented  Skin: No rash, No Ulcers  Psych: Normal mood and affect      Assessment and Plan:    1. Uncontrolled type 2 diabetes mellitus with hyperosmolarity without coma, without long-term " current use of insulin (CMS-[facility])    She  is now on:  1.  Trulicity 1.5 once a week (Take on Monday's)  2.  Metformin 500mg ER one in AM one in PM  Needs to check BG 4 times a week    Return in about 3 months (around 5/5/2018).    Blood glucose log: Check BG in the morning when wake up, before lunch or dinner and before bed.  So three times a day.  Always bring BG diary to the next office visit.     Thank you kindly for allowing me to participate in the diabetes care plan for this patient.    Martin Leblanc PA-C, BC-[facility]  Board Certified - Advanced Diabetes Management  02/05/18    CC:   Parveen Barahona M.D.

## 2018-02-23 ENCOUNTER — TELEPHONE (OUTPATIENT)
Dept: ENDOCRINOLOGY | Facility: MEDICAL CENTER | Age: 73
End: 2018-02-23

## 2018-02-23 RX ORDER — LANCETS 30 GAUGE
1 EACH MISCELLANEOUS 3 TIMES DAILY
Qty: 100 EACH | Refills: 11 | Status: SHIPPED | OUTPATIENT
Start: 2018-02-23 | End: 2018-07-24 | Stop reason: SDUPTHER

## 2018-02-23 RX ORDER — BLOOD-GLUCOSE METER
1 KIT MISCELLANEOUS 3 TIMES DAILY
Qty: 1 KIT | Refills: 1 | Status: SHIPPED | OUTPATIENT
Start: 2018-02-23 | End: 2018-08-13 | Stop reason: SDUPTHER

## 2018-02-23 NOTE — TELEPHONE ENCOUNTER
1. Caller Name: Margy Boyer                                           Call Back Number: [phone] (home)       Patient approves a detailed voicemail message: N\A      PT called wanting to get a refill on her Freestyle lancets, Test Strips, and device pt stated that her insurance will not pay for One touch anymore they will only pay for Freestyle. Please advised

## 2018-03-09 ENCOUNTER — TELEPHONE (OUTPATIENT)
Dept: ENDOCRINOLOGY | Facility: MEDICAL CENTER | Age: 73
End: 2018-03-09

## 2018-03-09 RX ORDER — BLOOD-GLUCOSE METER
1 KIT MISCELLANEOUS 3 TIMES DAILY
Qty: 1 KIT | Refills: 1 | Status: SHIPPED | OUTPATIENT
Start: 2018-03-09 | End: 2018-05-31 | Stop reason: SDUPTHER

## 2018-03-09 NOTE — TELEPHONE ENCOUNTER
1. Caller Name: pt                                         Call Back Number: [phone] (home)         Patient approves a detailed voicemail message: N\A    Patient call asked if you could be sent a prescription for free Style to [facility] since her insurance doesn't cove the previous ones. Can you please advise. Thank you

## 2018-05-07 ENCOUNTER — OFFICE VISIT (OUTPATIENT)
Dept: ENDOCRINOLOGY | Facility: MEDICAL CENTER | Age: 73
End: 2018-05-07
Payer: MEDICARE

## 2018-05-07 VITALS
HEIGHT: 65 IN | SYSTOLIC BLOOD PRESSURE: 128 MMHG | HEART RATE: 108 BPM | DIASTOLIC BLOOD PRESSURE: 60 MMHG | WEIGHT: 118.2 LBS | OXYGEN SATURATION: 98 % | BODY MASS INDEX: 19.69 KG/M2

## 2018-05-07 DIAGNOSIS — E78.5 TYPE 2 DIABETES MELLITUS WITH HYPERLIPIDEMIA (HCC): ICD-10-CM

## 2018-05-07 DIAGNOSIS — E11.69 TYPE 2 DIABETES MELLITUS WITH HYPERLIPIDEMIA (HCC): ICD-10-CM

## 2018-05-07 LAB
HBA1C MFR BLD: 6.9 % (ref ?–5.8)
INT CON NEG: NORMAL
INT CON POS: NORMAL

## 2018-05-07 PROCEDURE — 99214 OFFICE O/P EST MOD 30 MIN: CPT | Performed by: PHYSICIAN ASSISTANT

## 2018-05-07 PROCEDURE — 83036 HEMOGLOBIN GLYCOSYLATED A1C: CPT | Performed by: PHYSICIAN ASSISTANT

## 2018-05-07 NOTE — PROGRESS NOTES
Return to office Patient Consult Note  Referred by: Parveen Barahona M.D.    Reason for consult: Diabetes Management Type 2    HPI:  Margy Boyer is a 73 y.o. old patient who is seeing us today for diabetes care.  This is a pleasant patient with diabetes and I appreciate the opportunity to participate in the care of this patient.    BG Diary:5/7/2018  In the AM: 126, 82, 94, 115, 115, 98, 101  Before meal: 119, 90, 122, 130, 141, 110      BG Diary:5/3/2017  In the AM: 140, 120, 106, 98, 104,    Before meal: 133, 95, 188, 134    Before Bed: 127, 113, 134, 105, 160      Labs of 5/7/18 HbA1c is 6.9  2/5/18 HbA1c is 7.4  8/7/17 HbA1c is 7.1  2/12/17 HbA1c 12.9,  GFR 59  8/7/17 HbA1c is 7.2     8/25/17 GFR 54     BG Diary:4/25/2017  In the AM: 135, 116, 123, 115, 142, 132, 124  Just before meal: 137, 216, 179, 59, 120, 138,    Just before Bed: 150, 280, 207, 218, 193, 174       Weight:She was 139 on 4/25/17 and today is 125        1. Type 2 diabetes mellitus with hyperlipidemia (CMS-HCC)     She  is now on:  1.  Trulicity 1.5 once a week (Take on Monday's)  2.  Metformin 500mg ER one in AM one in PM     She is doing very well on this combination.  No nausea or Diarrhea.  We tried several medications before this combo for her.        ROS:   Constitutional: No night sweats.  Eyes:  No visual changes.  Cardiac: No chest pain, No palpitations or racing heart rate.  Resp: No shortness of breath, No cough,   Gi: No Diarrhea    All other systems were reviewed and were/are negative.  The ROS was revised/revisited during this office visit from the patients first office visit with me on 4/25/17Please review the full ROS during the first office visit.    Past Medical History:  Patient Active Problem List    Diagnosis Date Noted   • Diabetes mellitus type II, uncontrolled ([facility]) 04/25/2017   • Type 2 diabetes mellitus with hyperlipidemia ([facility]) 04/25/2017   • Chronic low back pain 09/29/2016   • DDD (degenerative disc disease),  lumbar 09/29/2016   • Spondylosis of lumbosacral region without myelopathy or radiculopathy 09/29/2016   • Chronic neck pain 09/29/2016   • Osteoarthritis of spine with radiculopathy, cervical region 09/29/2016   • DDD (degenerative disc disease), cervical 09/29/2016   • Muscle weakness of left upper extremity 09/29/2016   • Left hand pain 06/10/2015   • Contracture of joint of hand 01/05/2015   • Fracture of metacarpal bone 12/23/2013       Past Surgical History:  Past Surgical History:   Procedure Laterality Date   • POSTERIOR CERVICAL FUSION O-ARM  11/8/2017    Procedure: CERVICAL FUSION POSTERIOR O-ARM C3-T1 INSTRUMENTED;  Surgeon: Fabiola Ferreira M.D.;  Location: SURGERY [facility];  Service: Neurosurgery   • CERVICAL LAMINECTOMY POSTERIOR  11/8/2017    Procedure: CERVICAL LAMINECTOMY POSTERIOR  C3-T1 DECOMPRESSIVE;  Surgeon: Fabiola Ferreira M.D.;  Location: SURGERY [facility];  Service: Neurosurgery   • FORAMINOTOMY Bilateral 11/8/2017    Procedure: FORAMINOTOMY;  Surgeon: Fabiola Ferreira M.D.;  Location: SURGERY [facility];  Service: Neurosurgery   • OTHER ORTHOPEDIC SURGERY  04/2013    left hand ORIF   • OTHER  1994    lumph nodes removed from rt axilla   • DILATION AND CURETTAGE  1971    for partial miscarriage   • GYN SURGERY      tubal ligation   • OTHER ORTHOPEDIC SURGERY  ?2012    Right knee arthroscopy   • OTHER ORTHOPEDIC SURGERY  2012/1974    bilat bunion removal       Allergies:  Aluminum; Asa [aspirin]; Celecoxib; Codeine; Other food; Pcn [penicillins]; Soap; Tetracycline; and Vioxx    Social History:  Social History     Social History   • Marital status: Single     Spouse name: N/A   • Number of children: N/A   • Years of education: N/A     Occupational History   • Not on file.     Social History Main Topics   • Smoking status: Never Smoker   • Smokeless tobacco: Never Used   • Alcohol use No   • Drug use: No   • Sexual activity: Not on file     Other Topics Concern   • Not on  file     Social History Narrative   • No narrative on file       Family History:  No family history on file.    Medications:    Current Outpatient Prescriptions:   •  Blood Glucose Monitoring Suppl (FREESTYLE FREEDOM LITE) w/Device Kit, 1 Kit by Does not apply route 3 times a day., Disp: 1 Kit, Rfl: 1  •  glucose blood (FREESTYLE LITE) strip, 1 Strip by Other route 3 times a day before meals., Disp: 100 Strip, Rfl: 11  •  glucose blood (FREESTYLE LITE) strip, 1 Strip by Other route as needed., Disp: 100 Strip, Rfl: 6  •  Lancets Misc, 1 Applicator by Does not apply route 3 times a day., Disp: 100 Each, Rfl: 11  •  Blood Glucose Monitoring Suppl (FREESTYLE FREEDOM LITE) w/Device Kit, 1 Kit by Does not apply route 3 times a day., Disp: 1 Kit, Rfl: 1  •  Blood Glucose Monitoring Suppl (ONETOUCH VERIO) w/Device Kit, 1 Device by Does not apply route 3 times a day before meals., Disp: 1 Kit, Rfl: 1  •  glucose blood (ONETOUCH VERIO) strip, 1 Strip by Other route 2 times daily with meals as needed (on insulin checking 3-4 times a day)., Disp: 50 Strip, Rfl: 11  •  metFORMIN ER (GLUCOPHAGE XR) 500 MG TABLET SR 24 HR, Take 2 Tabs by mouth 2 times a day. Please give Generic XR Metformin, Disp: 360 Tab, Rfl: 3  •  Dulaglutide (TRULICITY) 1.5 MG/0.5ML Solution Pen-injector, Inject 0.5 mL as instructed every 7 days., Disp: 12 PEN, Rfl: 3  •  senna-docusate (PERICOLACE OR SENOKOT S) 8.6-50 MG Tab, Take 1 Tab by mouth every day., Disp: 30 Tab, Rfl: 0  •  bisacodyl (DULCOLAX) 10 MG Suppos, Insert 1 Suppository in rectum every 24 hours as needed (if sennosides, docusate, polyethylene glycol 3350, and/or magnesium hydroxide ineffective or not ordered)., Disp: , Rfl: 0  •  methocarbamol (ROBAXIN) 750 MG Tab, Take 1 Tab by mouth every 8 hours as needed., Disp: 120 Tab, Rfl:   •  labetalol (NORMODYNE,TRANDATE) 5 MG/ML Solution, 2 mL by Intravenous route every 1 hour as needed (SBP over 160 mmHg.  Hold for HR less than 55.)., Disp: ,  "Rfl: 0  •  benzocaine-menthol (CEPACOL) 15-3.6 MG Lozenge, Spray 1 Lozenge in mouth/throat every 2 hours as needed (for sore throat)., Disp: , Rfl:   •  vitamin D 5000 units Tab, Take 5,000 Units by mouth every day., Disp: 60 Tab, Rfl:   •  budesonide-formoterol (SYMBICORT) 160-4.5 MCG/ACT Aerosol, Inhale 2 Puffs by mouth 2 Times a Day., Disp: , Rfl:   •  losartan (COZAAR) 50 MG Tab, Take 1 Tab by mouth every day., Disp: 30 Tab, Rfl:   •  hydrochlorothiazide (HYDRODIURIL) 12.5 MG tablet, Take 1 Tab by mouth every day., Disp: 30 Tab, Rfl:   •  ondansetron (ZOFRAN) 4 MG/2ML Solution injection, 2 mL by Intravenous route every four hours as needed for Nausea., Disp: 15 mL, Rfl:   •  hydrocodone-acetaminophen (NORCO) 7.5-325 MG per tablet, Take 1-2 Tabs by mouth every 6 hours as needed., Disp: 20 Tab, Rfl: 0  •  oxycodone-acetaminophen (PERCOCET) 7.5-325 MG per tablet, Take 1-2 Tabs by mouth every four hours as needed for Moderate Pain or Severe Pain., Disp: 30 Tab, Rfl: 0  •  alprazolam (XANAX) 0.25 MG Tab, Take 1 Tab by mouth 2 times a day as needed for Anxiety., Disp: 30 Tab, Rfl: 0  •  losartan-hydrochlorothiazide (HYZAAR) 50-12.5 MG per tablet, Take 1 Tab by mouth every day., Disp: , Rfl:   •  fluticasone-salmeterol (ADVAIR DISKUS) 250-50 MCG/DOSE AEROSOL POWDER, BREATH ACTIVATED, Inhale 1 Puff by mouth 2 times a day as needed., Disp: , Rfl:   •  pantoprazole (PROTONIX) 40 MG Tablet Delayed Response, Take 40 mg by mouth every morning., Disp: , Rfl:         Physical Examination:   Vital signs: /60   Pulse (!) 108   Ht 1.651 m (5' 5\")   Wt 53.6 kg (118 lb 3.2 oz)   SpO2 98%   BMI 19.67 kg/m²   General: No distress, cooperative, well dressed and well nourished.   Eyes: No scleral icterus or discharge, No hyposphagma  ENMT: Normal on external inspection of nose, lips, No nasal drainage   Neck: No abnormal masses on inspection  Resp: Normal effort, Bilateral clear to auscultation, No wheezing, No rales  CVS: " Regular rate and rhythm, S1 S2 normal, No murmur. No gallop  Extremities: No edema bilateral extremities  Neuro: Alert and oriented  Skin: No rash, No Ulcers  Psych: Normal mood and affect      Assessment and Plan:    1. Type 2 diabetes mellitus with hyperlipidemia (HCC)    She  is now on:  1.  Trulicity 1.5 once a week (Take on Monday's)  2.  Metformin 500mg ER one in AM one in PM    Return in about 3 months (around 8/7/2018).    Blood glucose log: Check BG in the morning when wake up, before lunch or dinner and before bed.  So three times a day.  Always bring BG diary to the next office visit.     Thank you kindly for allowing me to participate in the diabetes care plan for this patient.    Martin Leblanc PA-C, BC-ADM  Board Certified - Advanced Diabetes Management  05/07/18    CC:   Parveen Barahona M.D.

## 2018-06-01 RX ORDER — BLOOD-GLUCOSE METER
KIT MISCELLANEOUS
Qty: 1 DEVICE | Refills: 1 | Status: SHIPPED | OUTPATIENT
Start: 2018-06-01 | End: 2018-08-13

## 2018-06-06 ENCOUNTER — HOSPITAL ENCOUNTER (OUTPATIENT)
Dept: RADIOLOGY | Facility: MEDICAL CENTER | Age: 73
End: 2018-06-06

## 2018-06-07 ENCOUNTER — HOSPITAL ENCOUNTER (OUTPATIENT)
Dept: RADIOLOGY | Facility: MEDICAL CENTER | Age: 73
End: 2018-06-07

## 2018-07-16 ENCOUNTER — TELEPHONE (OUTPATIENT)
Dept: ENDOCRINOLOGY | Facility: MEDICAL CENTER | Age: 73
End: 2018-07-16

## 2018-07-16 RX ORDER — FLASH GLUCOSE SENSOR
1 KIT MISCELLANEOUS
Qty: 3 EACH | Refills: 11 | Status: SHIPPED | OUTPATIENT
Start: 2018-07-16 | End: 2018-07-25 | Stop reason: SDUPTHER

## 2018-07-16 NOTE — TELEPHONE ENCOUNTER
Was the patient seen in the last year in this department? Yes     Does patient have an active prescription for medications requested? No     Received Request Via: Pharmacy     [employer] [facility] pharmacy

## 2018-07-25 ENCOUNTER — TELEPHONE (OUTPATIENT)
Dept: ENDOCRINOLOGY | Facility: MEDICAL CENTER | Age: 73
End: 2018-07-25

## 2018-07-25 RX ORDER — LANCETS 30 GAUGE
1 EACH MISCELLANEOUS 3 TIMES DAILY
Qty: 100 EACH | Refills: 11 | Status: SHIPPED | OUTPATIENT
Start: 2018-07-25

## 2018-07-25 RX ORDER — FLASH GLUCOSE SENSOR
1 KIT MISCELLANEOUS
Qty: 3 EACH | Refills: 11 | Status: SHIPPED | OUTPATIENT
Start: 2018-07-25 | End: 2018-08-13

## 2018-07-25 NOTE — TELEPHONE ENCOUNTER
Phone Number Called: [phone] (home)       Message: patient called saying she was unable to  her prescription for the gabbi test strips. I called the pharmacy and they said that there must have been a mistake and that she was able to  her test strips  Called patient back to let her know she was able to  her prescription. She had no further questions.    Left Message for patient to call back: N\A

## 2018-08-13 ENCOUNTER — OFFICE VISIT (OUTPATIENT)
Dept: ENDOCRINOLOGY | Facility: MEDICAL CENTER | Age: 73
End: 2018-08-13
Payer: MEDICARE

## 2018-08-13 VITALS
HEART RATE: 103 BPM | HEIGHT: 65 IN | WEIGHT: 116.4 LBS | DIASTOLIC BLOOD PRESSURE: 68 MMHG | BODY MASS INDEX: 19.39 KG/M2 | OXYGEN SATURATION: 98 % | SYSTOLIC BLOOD PRESSURE: 98 MMHG

## 2018-08-13 DIAGNOSIS — E11.69 TYPE 2 DIABETES MELLITUS WITH HYPERLIPIDEMIA (HCC): ICD-10-CM

## 2018-08-13 DIAGNOSIS — E78.5 TYPE 2 DIABETES MELLITUS WITH HYPERLIPIDEMIA (HCC): ICD-10-CM

## 2018-08-13 DIAGNOSIS — E11.00 UNCONTROLLED TYPE 2 DIABETES MELLITUS WITH HYPEROSMOLARITY WITHOUT COMA, WITHOUT LONG-TERM CURRENT USE OF INSULIN (HCC): ICD-10-CM

## 2018-08-13 LAB
HBA1C MFR BLD: 7.2 % (ref ?–5.8)
INT CON NEG: NORMAL
INT CON POS: NORMAL

## 2018-08-13 PROCEDURE — 99214 OFFICE O/P EST MOD 30 MIN: CPT | Performed by: PHYSICIAN ASSISTANT

## 2018-08-13 PROCEDURE — 83036 HEMOGLOBIN GLYCOSYLATED A1C: CPT | Performed by: PHYSICIAN ASSISTANT

## 2018-08-13 RX ORDER — BLOOD-GLUCOSE METER
1 KIT MISCELLANEOUS 3 TIMES DAILY
Qty: 1 KIT | Refills: 1 | Status: SHIPPED | OUTPATIENT
Start: 2018-08-13 | End: 2018-08-13 | Stop reason: SDUPTHER

## 2018-08-13 RX ORDER — LANCETS 30 GAUGE
1 EACH MISCELLANEOUS 3 TIMES DAILY
Qty: 100 EACH | Refills: 11 | Status: SHIPPED | OUTPATIENT
Start: 2018-08-13

## 2018-08-13 RX ORDER — LANCETS 30 GAUGE
1 EACH MISCELLANEOUS 3 TIMES DAILY
Qty: 100 EACH | Refills: 11 | Status: SHIPPED | OUTPATIENT
Start: 2018-08-13 | End: 2018-08-13 | Stop reason: SDUPTHER

## 2018-08-13 RX ORDER — BLOOD-GLUCOSE METER
1 KIT MISCELLANEOUS 3 TIMES DAILY
Qty: 1 KIT | Refills: 1 | Status: SHIPPED | OUTPATIENT
Start: 2018-08-13

## 2018-08-13 NOTE — PROGRESS NOTES
Return to office Patient Consult Note  Referred by: Parveen Barahona M.D.     Reason for consult: Diabetes Management Type 2    HPI:  Margy Boyer is a 73 y.o. old patient who is seeing us today for diabetes care.  This is a pleasant patient with diabetes and I appreciate the opportunity to participate in the care of this patient.    Labs of 8/13/18 HbA1c is 7.2  Labs of 5/7/18 HbA1c is 6.9  2/5/18 HbA1c is 7.4  8/7/17 HbA1c is 7.1  2/12/17 HbA1c 12.9,  GFR 59  8/7/17 HbA1c is 7.2     8/25/17 GFR 54    BG Diary:8/13/2018  In the AM:113, 131, 91, 93, 116    BG Diary:5/7/2018  In the AM: 126, 82, 94, 115, 115, 98, 101  Before meal: 119, 90, 122, 130, 141, 110     Weight:She was 139 on 4/25/17 and today is 125      1. Uncontrolled type 2 diabetes mellitus with hyperosmolarity without coma, without long-term current use of insulin (HCC)  She  is now on:  1.  Trulicity 1.5 once a week (Take on Monday's)  2.  Metformin 500mg ER one in AM one in PM     She is doing very well on this combination.  No nausea or Diarrhea.  We tried several medications before this combo for her.      2. Type 2 diabetes mellitus with hyperlipidemia (HCC)  No changes needed        ROS:   Constitutional: No night sweats.  Eyes:  No visual changes.  Cardiac: No chest pain, No palpitations or racing heart rate.  Resp: No shortness of breath, No cough,   Gi: No Diarrhea    All other systems were reviewed and were/are negative.  The ROS was revised/revisited during this office visit from the patients first office visit with me on 4/25/17 Please review the full ROS during the first office visit.    Past Medical History:  Patient Active Problem List    Diagnosis Date Noted   • Diabetes mellitus type II, uncontrolled (HCC) 04/25/2017   • Type 2 diabetes mellitus with hyperlipidemia (HCC) 04/25/2017   • Chronic low back pain 09/29/2016   • DDD (degenerative disc disease), lumbar 09/29/2016   • Spondylosis of lumbosacral region without myelopathy or  radiculopathy 09/29/2016   • Chronic neck pain 09/29/2016   • Osteoarthritis of spine with radiculopathy, cervical region 09/29/2016   • DDD (degenerative disc disease), cervical 09/29/2016   • Muscle weakness of left upper extremity 09/29/2016   • Left hand pain 06/10/2015   • Contracture of joint of hand 01/05/2015   • Fracture of metacarpal bone 12/23/2013       Past Surgical History:  Past Surgical History:   Procedure Laterality Date   • POSTERIOR CERVICAL FUSION O-ARM  11/8/2017    Procedure: CERVICAL FUSION POSTERIOR O-ARM C3-T1 INSTRUMENTED;  Surgeon: Fabiola Ferreira M.D.;  Location: SURGERY [facility];  Service: Neurosurgery   • CERVICAL LAMINECTOMY POSTERIOR  11/8/2017    Procedure: CERVICAL LAMINECTOMY POSTERIOR  C3-T1 DECOMPRESSIVE;  Surgeon: Fabiola Ferreira M.D.;  Location: SURGERY [facility];  Service: Neurosurgery   • FORAMINOTOMY Bilateral 11/8/2017    Procedure: FORAMINOTOMY;  Surgeon: Fabiola Ferreira M.D.;  Location: SURGERY [facility];  Service: Neurosurgery   • OTHER ORTHOPEDIC SURGERY  04/2013    left hand ORIF   • OTHER  1994    lumph nodes removed from rt axilla   • DILATION AND CURETTAGE  1971    for partial miscarriage   • GYN SURGERY      tubal ligation   • OTHER ORTHOPEDIC SURGERY  ?2012    Right knee arthroscopy   • OTHER ORTHOPEDIC SURGERY  2012/1974    bilat bunion removal       Allergies:  Aluminum; Asa [aspirin]; Celecoxib; Codeine; Other food; Pcn [penicillins]; Soap; Tetracycline; and Vioxx    Social History:  Social History     Social History   • Marital status: Single     Spouse name: N/A   • Number of children: N/A   • Years of education: N/A     Occupational History   • Not on file.     Social History Main Topics   • Smoking status: Never Smoker   • Smokeless tobacco: Never Used   • Alcohol use No   • Drug use: No   • Sexual activity: Not on file     Other Topics Concern   • Not on file     Social History Narrative   • No narrative on file       Family  History:  No family history on file.    Medications:    Current Outpatient Prescriptions:   •  LOVASTATIN PO, lovastatin, Disp: , Rfl:   •  Lancets Misc, 1 Applicator by Does not apply route 3 times a day., Disp: 100 Each, Rfl: 11  •  glucose blood (FREESTYLE LITE) strip, 1 Strip by Other route as needed., Disp: 100 Strip, Rfl: 6  •  metFORMIN ER (GLUCOPHAGE XR) 500 MG TABLET SR 24 HR, Take 2 Tabs by mouth 2 times a day. Please give Generic XR Metformin, Disp: 360 Tab, Rfl: 3  •  Dulaglutide (TRULICITY) 1.5 MG/0.5ML Solution Pen-injector, Inject 0.5 mL as instructed every 7 days., Disp: 12 PEN, Rfl: 3  •  vitamin D 5000 units Tab, Take 5,000 Units by mouth every day., Disp: 60 Tab, Rfl:   •  losartan (COZAAR) 50 MG Tab, Take 1 Tab by mouth every day., Disp: 30 Tab, Rfl:   •  Continuous Blood Gluc Sensor (FREESTYLE JOYA SENSOR SYSTEM) Misc, 1 Applicator by Does not apply route every 10 days., Disp: 3 Each, Rfl: 11  •  Blood Glucose Monitoring Suppl (FREESTYLE LITE) Device, USE TO TEST THREE TIMES DAILY, Disp: 1 Device, Rfl: 1  •  Blood Glucose Monitoring Suppl (FREESTYLE FREEDOM LITE) w/Device Kit, 1 Kit by Does not apply route 3 times a day., Disp: 1 Kit, Rfl: 1  •  Blood Glucose Monitoring Suppl (ONETOUCH VERIO) w/Device Kit, 1 Device by Does not apply route 3 times a day before meals., Disp: 1 Kit, Rfl: 1  •  bisacodyl (DULCOLAX) 10 MG Suppos, Insert 1 Suppository in rectum every 24 hours as needed (if sennosides, docusate, polyethylene glycol 3350, and/or magnesium hydroxide ineffective or not ordered)., Disp: , Rfl: 0  •  benzocaine-menthol (CEPACOL) 15-3.6 MG Lozenge, Spray 1 Lozenge in mouth/throat every 2 hours as needed (for sore throat)., Disp: , Rfl:   •  budesonide-formoterol (SYMBICORT) 160-4.5 MCG/ACT Aerosol, Inhale 2 Puffs by mouth 2 Times a Day., Disp: , Rfl:   •  alprazolam (XANAX) 0.25 MG Tab, Take 1 Tab by mouth 2 times a day as needed for Anxiety., Disp: 30 Tab, Rfl: 0        Physical  "Examination:   Vital signs: BP (!) 98/68   Pulse (!) 103   Ht 1.651 m (5' 5\")   Wt 52.8 kg (116 lb 6.4 oz)   SpO2 98%   BMI 19.37 kg/m²   General: No distress, cooperative, well dressed and well nourished.   Eyes: No scleral icterus or discharge, No hyposphagma  ENMT: Normal on external inspection of nose, lips, No nasal drainage   Neck: No abnormal masses on inspection  Resp: Normal effort, Bilateral clear to auscultation, No wheezing, No rales  CVS: Regular rate and rhythm, S1 S2 normal, No murmur. No gallop  Extremities: No edema bilateral extremities  Neuro: Alert and oriented  Skin: No rash, No Ulcers  Psych: Normal mood and affect      Assessment and Plan:    1. Uncontrolled type 2 diabetes mellitus with hyperosmolarity without coma, without long-term current use of insulin (HCC)    She  is now on:  1.  Trulicity 1.5 once a week (Take on Monday's)  2.  Metformin 500mg ER one in AM one in PM (STOP)    Return in about 3 months (around 11/13/2018).    Blood glucose log: Check BG in the morning when wake up, before lunch or dinner and before bed.  So three times a day.  Always bring BG diary to the next office visit.     Thank you kindly for allowing me to participate in the diabetes care plan for this patient.    Martin Leblanc PA-C, BC-ADM  Board Certified - Advanced Diabetes Management  08/13/18    CC:   Parveen Barahona M.D.    "

## 2018-08-16 NOTE — TELEPHONE ENCOUNTER
Was the patient seen in the last year in this department? Yes    Does patient have an active prescription for medications requested? No     Received Request Via: Pharmacy         [facility] strip

## 2018-08-17 RX ORDER — BLOOD-GLUCOSE METER
KIT MISCELLANEOUS
Qty: 100 STRIP | Refills: 0 | Status: SHIPPED | OUTPATIENT
Start: 2018-08-17 | End: 2019-09-23 | Stop reason: SDUPTHER

## 2018-11-20 ENCOUNTER — PATIENT OUTREACH (OUTPATIENT)
Dept: HEALTH INFORMATION MANAGEMENT | Facility: OTHER | Age: 73
End: 2018-11-20

## 2018-11-21 ASSESSMENT — PATIENT HEALTH QUESTIONNAIRE - PHQ9: CLINICAL INTERPRETATION OF PHQ2 SCORE: 0

## 2018-11-21 ASSESSMENT — ENCOUNTER SYMPTOMS
OCCASIONAL FEELINGS OF UNSTEADINESS: 1
LOSS OF SENSATION IN FEET: 0
DEPRESSION: 0

## 2018-11-21 ASSESSMENT — PAIN SCALES - GENERAL: PAINLEVEL: 3=SLIGHT PAIN

## 2018-11-22 NOTE — PROGRESS NOTES
"TCN met patient at bedside in [facility] on 11/20/18; awake, resting in bed with her neck brace on. TCN introduced self, explained role/program; patient is agreeable to our services and open to a home visit upon DC from SNF, if appropriate. Patient was open to talking about her current situation and completed our Intake Assessment (see input results).  Patient lives alone, with her son/family close by; they are very involved and helpful and her son mentioned to her that they may want her to stay with them after she is DC'd; her granddaughter came in right when we were finishing up and she stated the same, saying \"I may need to move out of my room so she can stay there\".  Patient mentioned she knows she'll have help from  again and that Dai was great after her 1st surgery last Nov.      Patient denied any concerns/questions at this time and states understanding that TCN will be in touch prior to her discharge home; both she and her granddaughter voiced appreciation for the visit.      PLAN: TCN will continue to follow while in SNF and will schedule an appropriate date for a home visit, if appropriate, when discharged home.  "

## 2019-01-10 RX ORDER — DULAGLUTIDE 1.5 MG/.5ML
INJECTION, SOLUTION SUBCUTANEOUS
Qty: 4 PEN | Refills: 7 | Status: SHIPPED | OUTPATIENT
Start: 2019-01-10 | End: 2019-05-28

## 2019-01-10 NOTE — TELEPHONE ENCOUNTER
Was the patient seen in the last year in this department? Yes    Does patient have an active prescription for medications requested? No     Received Request Via: Pharmacy     TRULICITY 1.5 MG/0.5ML Solution Pen-injector    Seen 8/13/18

## 2019-01-11 ENCOUNTER — PATIENT OUTREACH (OUTPATIENT)
Dept: HEALTH INFORMATION MANAGEMENT | Facility: OTHER | Age: 74
End: 2019-01-11

## 2019-01-11 NOTE — PROGRESS NOTES
Transitional Care Navigator:    Situation    First post SNF follow up call. Pt discharged from [facility] on 1/3/19 after a surgical revision to C3-T1 spine. Surgery was performed at [facility].   Background       Pt lives alone in [place], with her family living nearby.   Assessment    Pt was not available by phone.   Recommendation This patient lives in [facility], and will be followed for 30 days post SNF discharge.     TCN will continue to follow.

## 2019-01-24 ENCOUNTER — PATIENT OUTREACH (OUTPATIENT)
Dept: HEALTH INFORMATION MANAGEMENT | Facility: OTHER | Age: 74
End: 2019-01-24

## 2019-01-24 NOTE — PROGRESS NOTES
Transitional Care Navigator:    Situation    3rd post SNF discharge call.   Background       Pt discharged from [facility] on 1/3/19, to her home in [place], where she lives alone. She has support services in place.   Assessment    No answer; unable to leave message.   Recommendation TCN will continue to follow.

## 2019-01-30 ENCOUNTER — PATIENT OUTREACH (OUTPATIENT)
Dept: HEALTH INFORMATION MANAGEMENT | Facility: OTHER | Age: 74
End: 2019-01-30

## 2019-01-30 NOTE — PROGRESS NOTES
TCN attempted 4th 30 day post SNF discharge call. No answer; message left.      TCN will sign off, as patient is not available.

## 2019-02-12 ENCOUNTER — PATIENT OUTREACH (OUTPATIENT)
Dept: HEALTH INFORMATION MANAGEMENT | Facility: OTHER | Age: 74
End: 2019-02-12

## 2019-02-12 NOTE — PROGRESS NOTES
Outcome: No available/ No VM     WebIZ Checked & Epic Updated:  yes    [facility] Verified: yes    Attempt # 1

## 2019-03-25 ENCOUNTER — OFFICE VISIT (OUTPATIENT)
Dept: ENDOCRINOLOGY | Facility: MEDICAL CENTER | Age: 74
End: 2019-03-25
Payer: MEDICARE

## 2019-03-25 VITALS
BODY MASS INDEX: 19.22 KG/M2 | OXYGEN SATURATION: 97 % | WEIGHT: 115.4 LBS | SYSTOLIC BLOOD PRESSURE: 124 MMHG | HEIGHT: 65 IN | DIASTOLIC BLOOD PRESSURE: 82 MMHG | HEART RATE: 106 BPM

## 2019-03-25 DIAGNOSIS — E11.69 TYPE 2 DIABETES MELLITUS WITH HYPERLIPIDEMIA (HCC): ICD-10-CM

## 2019-03-25 DIAGNOSIS — E78.5 TYPE 2 DIABETES MELLITUS WITH HYPERLIPIDEMIA (HCC): ICD-10-CM

## 2019-03-25 DIAGNOSIS — E11.649 UNCONTROLLED TYPE 2 DIABETES MELLITUS WITH HYPOGLYCEMIA, UNSPECIFIED HYPOGLYCEMIA COMA STATUS (HCC): ICD-10-CM

## 2019-03-25 LAB
HBA1C MFR BLD: 8.2 % (ref ?–5.8)
INT CON NEG: NORMAL
INT CON POS: NORMAL

## 2019-03-25 PROCEDURE — 99213 OFFICE O/P EST LOW 20 MIN: CPT | Performed by: PHYSICIAN ASSISTANT

## 2019-03-25 PROCEDURE — 83036 HEMOGLOBIN GLYCOSYLATED A1C: CPT | Performed by: PHYSICIAN ASSISTANT

## 2019-05-28 ENCOUNTER — OFFICE VISIT (OUTPATIENT)
Dept: ENDOCRINOLOGY | Facility: MEDICAL CENTER | Age: 74
End: 2019-05-28
Payer: MEDICARE

## 2019-05-28 VITALS
BODY MASS INDEX: 19.33 KG/M2 | DIASTOLIC BLOOD PRESSURE: 68 MMHG | OXYGEN SATURATION: 98 % | WEIGHT: 116 LBS | HEART RATE: 90 BPM | HEIGHT: 65 IN | SYSTOLIC BLOOD PRESSURE: 112 MMHG

## 2019-05-28 DIAGNOSIS — E78.5 TYPE 2 DIABETES MELLITUS WITH HYPERLIPIDEMIA (HCC): ICD-10-CM

## 2019-05-28 DIAGNOSIS — E11.649 UNCONTROLLED TYPE 2 DIABETES MELLITUS WITH HYPOGLYCEMIA WITHOUT COMA (HCC): ICD-10-CM

## 2019-05-28 DIAGNOSIS — E11.69 TYPE 2 DIABETES MELLITUS WITH HYPERLIPIDEMIA (HCC): ICD-10-CM

## 2019-05-28 PROCEDURE — 99213 OFFICE O/P EST LOW 20 MIN: CPT | Performed by: PHYSICIAN ASSISTANT

## 2019-05-28 NOTE — PROGRESS NOTES
Return to office Patient Consult Note  Referred by: Parveen Barahona M.D.    Reason for consult: Diabetes Management Type 2    HPI:  Margy Boyer is a 74 y.o. old patient who is seeing us today for diabetes care.  This is a pleasant patient with diabetes and I appreciate the opportunity to participate in the care of this patient.      Labs of HbA1c on 3/25/19 is 8.2  Labs of 8/13/18 HbA1c is 7.2  Labs of 5/7/18 HbA1c is 6.9  2/5/18 HbA1c is 7.4  8/7/17 HbA1c is 7.1  2/12/17 HbA1c 12.9,  GFR 59  8/7/17 HbA1c is 7.2     8/25/17 GFR 54    BG Diary:5/28/2019  In the AM:  No log    Weight:She was 139 on 4/25/17 and today is 125      1. Type 2 diabetes mellitus with hyperlipidemia (HCC)  She  is now on:  1.  Trulicity 1.5 once a week (Take on Monday's)  2.  Metformin 500mg ER one in AM one in PM (Stopped on 8/13/18)  3.  Jardiance 10mg one pill a day (3/25/19)     She is doing very well on this combination.  No nausea or Diarrhea.  We tried several medications before this combo for her.        2. Uncontrolled type 2 diabetes mellitus with hypoglycemia without coma (HCC)  Not at optimal control      ROS:   Constitutional: No night sweats.  Eyes:  No visual changes.  Cardiac: No chest pain, No palpitations or racing heart rate.  Resp: No shortness of breath, No cough,   Gi: No Diarrhea    All other systems were reviewed and were/are negative.      Past Medical History:  Patient Active Problem List    Diagnosis Date Noted   • Diabetes mellitus type II, uncontrolled (HCC) 04/25/2017   • Type 2 diabetes mellitus with hyperlipidemia (HCC) 04/25/2017   • Chronic low back pain 09/29/2016   • DDD (degenerative disc disease), lumbar 09/29/2016   • Spondylosis of lumbosacral region without myelopathy or radiculopathy 09/29/2016   • Chronic neck pain 09/29/2016   • Osteoarthritis of spine with radiculopathy, cervical region 09/29/2016   • DDD (degenerative disc disease), cervical 09/29/2016   • Muscle weakness of left upper  extremity 09/29/2016   • Left hand pain 06/10/2015   • Contracture of joint of hand 01/05/2015   • Fracture of metacarpal bone 12/23/2013       Past Surgical History:  Past Surgical History:   Procedure Laterality Date   • POSTERIOR CERVICAL FUSION O-ARM  11/8/2017    Procedure: CERVICAL FUSION POSTERIOR O-ARM C3-T1 INSTRUMENTED;  Surgeon: Fabiola Ferreira M.D.;  Location: SURGERY [facility];  Service: Neurosurgery   • CERVICAL LAMINECTOMY POSTERIOR  11/8/2017    Procedure: CERVICAL LAMINECTOMY POSTERIOR  C3-T1 DECOMPRESSIVE;  Surgeon: Fabiola Ferreira M.D.;  Location: SURGERY [facility];  Service: Neurosurgery   • FORAMINOTOMY Bilateral 11/8/2017    Procedure: FORAMINOTOMY;  Surgeon: Fabiola Ferreira M.D.;  Location: SURGERY [facility];  Service: Neurosurgery   • OTHER ORTHOPEDIC SURGERY  04/2013    left hand ORIF   • OTHER  1994    lumph nodes removed from rt axilla   • DILATION AND CURETTAGE  1971    for partial miscarriage   • GYN SURGERY      tubal ligation   • OTHER ORTHOPEDIC SURGERY  ?2012    Right knee arthroscopy   • OTHER ORTHOPEDIC SURGERY  2012/1974    bilat bunion removal       Allergies:  Aluminum; Asa [aspirin]; Celecoxib; Codeine; Other food; Pcn [penicillins]; Soap; Tetracycline; and Vioxx    Social History:  Social History     Social History   • Marital status: Single     Spouse name: N/A   • Number of children: N/A   • Years of education: N/A     Occupational History   • Not on file.     Social History Main Topics   • Smoking status: Never Smoker   • Smokeless tobacco: Never Used   • Alcohol use No   • Drug use: No   • Sexual activity: Not on file     Other Topics Concern   • Not on file     Social History Narrative   • No narrative on file       Family History:  History reviewed. No pertinent family history.    Medications:    Current Outpatient Prescriptions:   •  Empagliflozin 10 MG Tab, Take 1 tablet by mouth every morning with breakfast., Disp: 30 Tab, Rfl: 6  •  Dulaglutide  "(TRULICITY) 1.5 MG/0.5ML Solution Pen-injector, Inject 0.5 mL as instructed every 7 days., Disp: 4 PEN, Rfl: 6  •  FREESTYLE LITE strip, USE 1 STRIP TO CHECK GLUCOSE THREE TIMES DAILY BEFORE  MEALS, Disp: 100 Strip, Rfl: 0  •  LOVASTATIN PO, lovastatin, Disp: , Rfl:   •  Lancets Misc, 1 Applicator by Does not apply route 3 times a day., Disp: 100 Each, Rfl: 11  •  glucose blood (FREESTYLE LITE) strip, 1 Strip by Other route as needed., Disp: 100 Strip, Rfl: 6  •  Blood Glucose Monitoring Suppl (FREESTYLE FREEDOM LITE) w/Device Kit, 1 Kit by Does not apply route 3 times a day., Disp: 1 Kit, Rfl: 1  •  Lancets Misc, 1 Applicator by Does not apply route 3 times a day., Disp: 100 Each, Rfl: 11  •  bisacodyl (DULCOLAX) 10 MG Suppos, Insert 1 Suppository in rectum every 24 hours as needed (if sennosides, docusate, polyethylene glycol 3350, and/or magnesium hydroxide ineffective or not ordered)., Disp: , Rfl: 0  •  benzocaine-menthol (CEPACOL) 15-3.6 MG Lozenge, Spray 1 Lozenge in mouth/throat every 2 hours as needed (for sore throat)., Disp: , Rfl:   •  vitamin D 5000 units Tab, Take 5,000 Units by mouth every day., Disp: 60 Tab, Rfl:   •  budesonide-formoterol (SYMBICORT) 160-4.5 MCG/ACT Aerosol, Inhale 2 Puffs by mouth 2 Times a Day., Disp: , Rfl:   •  losartan (COZAAR) 50 MG Tab, Take 1 Tab by mouth every day., Disp: 30 Tab, Rfl:   •  alprazolam (XANAX) 0.25 MG Tab, Take 1 Tab by mouth 2 times a day as needed for Anxiety., Disp: 30 Tab, Rfl: 0        Physical Examination:   Vital signs: /68 (BP Location: Left arm, Patient Position: Sitting)   Pulse 90   Ht 1.651 m (5' 5\")   Wt 52.6 kg (116 lb)   SpO2 98%   BMI 19.30 kg/m²   General: No distress, cooperative, well dressed and well nourished.   Eyes: No scleral icterus or discharge, No hyposphagma  ENMT: Normal on external inspection of nose, lips, No nasal drainage   Neck: No abnormal masses on inspection  Resp: Normal effort, Bilateral clear to auscultation, " No wheezing, No rales  CVS: Regular rate and rhythm, S1 S2 normal, No murmur. No gallop  Extremities: No edema bilateral extremities  Neuro: Alert and oriented  Skin: No rash, No Ulcers  Psych: Normal mood and affect      Assessment and Plan:    1. Type 2 diabetes mellitus with hyperlipidemia (HCC)  She  is now on:  1.  Trulicity 1.5 once a week (Take on Monday's)  2.  Metformin 500mg ER one in AM one in PM (Stopped on 8/13/18)  3.  Start Jardiance 10mg one pill a day (3/25/19)     She is doing very well on this combination.  No nausea or Diarrhea.  We tried several medications before this combo for her.       Return in about 3 months (around 8/28/2019).          Thank you kindly for allowing me to participate in the diabetes care plan for this patient.    Martin Leblanc PA-C, BC-ADM  Board Certified - Advanced Diabetes Management  05/28/19    CC:   Parveen Barahona M.D.

## 2019-06-10 ENCOUNTER — TELEPHONE (OUTPATIENT)
Dept: ENDOCRINOLOGY | Facility: MEDICAL CENTER | Age: 74
End: 2019-06-10

## 2019-06-10 NOTE — TELEPHONE ENCOUNTER
1. Caller Name: [facility] called                                         Call Back Number:       Patient approves a detailed voicemail message: no    Pharmacy was confused about the amount of Jardiance. Pt got a refill in april of this year from another doctor for 25mg Jardiance but she has been gettin Jardiance 10mg from us. Wondering which amount she should be taking.

## 2019-06-14 NOTE — TELEPHONE ENCOUNTER
VOICEMAIL  1. Caller Name: [facility]                          Call Back Number: [phone]    2. Message: Stephanie LVM asking for clarification on the Jardiance mg's.     3. Patient approves office to leave a detailed voicemail/MyChart message: yes and N\A    I called back and left the pharmacy a VM letting them know she is to be on the 10mg.

## 2019-09-23 RX ORDER — BLOOD-GLUCOSE METER
KIT MISCELLANEOUS
Qty: 100 STRIP | Refills: 0 | Status: SHIPPED | OUTPATIENT
Start: 2019-09-23 | End: 2019-12-10 | Stop reason: SDUPTHER

## 2019-09-23 NOTE — TELEPHONE ENCOUNTER
Was the patient seen in the last year in this department? Yes    Does patient have an active prescription for medications requested? No     Received Request Via: Pharmacy     [employer] strip 100 Strip 0/0 8/17/2018    Sig: USE 1 STRIP TO CHECK GLUCOSE THREE TIMES DAILY BEFORE  MEALS

## 2019-11-08 ENCOUNTER — OFFICE VISIT (OUTPATIENT)
Dept: ENDOCRINOLOGY | Facility: MEDICAL CENTER | Age: 74
End: 2019-11-08
Payer: MEDICARE

## 2019-11-08 VITALS
SYSTOLIC BLOOD PRESSURE: 110 MMHG | WEIGHT: 106 LBS | HEIGHT: 65 IN | DIASTOLIC BLOOD PRESSURE: 60 MMHG | HEART RATE: 60 BPM | OXYGEN SATURATION: 93 % | BODY MASS INDEX: 17.66 KG/M2

## 2019-11-08 DIAGNOSIS — E11.69 TYPE 2 DIABETES MELLITUS WITH HYPERLIPIDEMIA (HCC): ICD-10-CM

## 2019-11-08 DIAGNOSIS — E78.5 TYPE 2 DIABETES MELLITUS WITH HYPERLIPIDEMIA (HCC): ICD-10-CM

## 2019-11-08 LAB
HBA1C MFR BLD: 8.9 % (ref 0–5.6)
INT CON NEG: NEGATIVE
INT CON POS: POSITIVE

## 2019-11-08 PROCEDURE — 99214 OFFICE O/P EST MOD 30 MIN: CPT | Performed by: PHYSICIAN ASSISTANT

## 2019-11-08 PROCEDURE — 83036 HEMOGLOBIN GLYCOSYLATED A1C: CPT | Performed by: PHYSICIAN ASSISTANT

## 2019-11-08 NOTE — PATIENT INSTRUCTIONS
She  is now on:  1.  Trulicity 1.5 once a week (Take on Monday's)  2.  Metformin 500mg ER one in AM one in PM (Stopped on 8/13/18)  3.  Jardiance 10mg one pill a day (3/25/19)  4.  Tresiba 10 units at night before bed (Start)

## 2019-11-08 NOTE — PROGRESS NOTES
Return to office Patient Consult Note  Referred by: Parveen Barahona M.D.    Reason for consult: Diabetes Management Type 2    HPI:  Mragy Boyer is a 74 y.o. old patient who is seeing us today for diabetes care.  This is a pleasant patient with diabetes and I appreciate the opportunity to participate in the care of this patient.    Labs of 11/8/2019 HbA1c is 8.9  Labs of HbA1c on 3/25/19 is 8.2  Labs of 8/13/18 HbA1c is 7.2  Labs of 5/7/18 HbA1c is 6.9  2/5/18 HbA1c is 7.4  8/7/17 HbA1c is 7.1  2/12/17 HbA1c 12.9,  GFR 59  8/7/17 HbA1c is 7.2     8/25/17 GFR 54    BG Diary:11/8/2019  In the AM:  No log      1. Type 2 diabetes mellitus with hyperlipidemia (HCC)    She  is now on:  1.  Trulicity 1.5 once a week (Take on Monday's)  2.  Metformin 500mg ER one in AM one in PM (Stopped on 8/13/18)  3.  Jardiance 10mg one pill a day (3/25/19)     She is doing very well on this combination.  No nausea or Diarrhea.  We tried several medications before this combo for her.         2. Uncontrolled type 2 diabetes mellitus with hypoglycemia without coma (HCC)  Not at optimal control      ROS:   Constitutional: No night sweats.  Eyes:  No visual changes.  Cardiac: No chest pain, No palpitations or racing heart rate.  Resp: No shortness of breath, No cough,   Gi: No Diarrhea    All other systems were reviewed and were/are negative.      Past Medical History:  Patient Active Problem List    Diagnosis Date Noted   • Diabetes mellitus type II, uncontrolled (HCC) 04/25/2017   • Type 2 diabetes mellitus with hyperlipidemia (HCC) 04/25/2017   • Chronic low back pain 09/29/2016   • DDD (degenerative disc disease), lumbar 09/29/2016   • Spondylosis of lumbosacral region without myelopathy or radiculopathy 09/29/2016   • Chronic neck pain 09/29/2016   • Osteoarthritis of spine with radiculopathy, cervical region 09/29/2016   • DDD (degenerative disc disease), cervical 09/29/2016   • Muscle weakness of left upper extremity 09/29/2016    • Left hand pain 06/10/2015   • Contracture of joint of hand 01/05/2015   • Fracture of metacarpal bone 12/23/2013       Past Surgical History:  Past Surgical History:   Procedure Laterality Date   • POSTERIOR CERVICAL FUSION O-ARM  11/8/2017    Procedure: CERVICAL FUSION POSTERIOR O-ARM C3-T1 INSTRUMENTED;  Surgeon: Fabiola Ferreira M.D.;  Location: SURGERY [facility];  Service: Neurosurgery   • CERVICAL LAMINECTOMY POSTERIOR  11/8/2017    Procedure: CERVICAL LAMINECTOMY POSTERIOR  C3-T1 DECOMPRESSIVE;  Surgeon: Fabiola Ferreira M.D.;  Location: SURGERY [facility];  Service: Neurosurgery   • FORAMINOTOMY Bilateral 11/8/2017    Procedure: FORAMINOTOMY;  Surgeon: Fabiola Ferreira M.D.;  Location: SURGERY [facility];  Service: Neurosurgery   • OTHER ORTHOPEDIC SURGERY  04/2013    left hand ORIF   • OTHER  1994    lumph nodes removed from rt axilla   • DILATION AND CURETTAGE  1971    for partial miscarriage   • GYN SURGERY      tubal ligation   • OTHER ORTHOPEDIC SURGERY  ?2012    Right knee arthroscopy   • OTHER ORTHOPEDIC SURGERY  2012/1974    bilat bunion removal       Allergies:  Aluminum; Asa [aspirin]; Celecoxib; Codeine; Other food; Pcn [penicillins]; Soap; Tetracycline; and Vioxx    Social History:  Social History     Socioeconomic History   • Marital status: Single     Spouse name: Not on file   • Number of children: Not on file   • Years of education: Not on file   • Highest education level: Not on file   Occupational History   • Not on file   Social Needs   • Financial resource strain: Not on file   • Food insecurity:     Worry: Not on file     Inability: Not on file   • Transportation needs:     Medical: Not on file     Non-medical: Not on file   Tobacco Use   • Smoking status: Never Smoker   • Smokeless tobacco: Never Used   Substance and Sexual Activity   • Alcohol use: No   • Drug use: No   • Sexual activity: Not on file   Lifestyle   • Physical activity:     Days per week: Not on file      Minutes per session: Not on file   • Stress: Not on file   Relationships   • Social connections:     Talks on phone: Not on file     Gets together: Not on file     Attends Catholic service: Not on file     Active member of club or organization: Not on file     Attends meetings of clubs or organizations: Not on file     Relationship status: Not on file   • Intimate partner violence:     Fear of current or ex partner: Not on file     Emotionally abused: Not on file     Physically abused: Not on file     Forced sexual activity: Not on file   Other Topics Concern   • Not on file   Social History Narrative   • Not on file       Family History:  History reviewed. No pertinent family history.    Medications:    Current Outpatient Medications:   •  FREESTYLE LITE strip, USE 1 STRIP TO CHECK GLUCOSE THREE TIMES DAILY BEFORE MEALS, Disp: 100 Strip, Rfl: 0  •  Empagliflozin 10 MG Tab, Take 1 tablet by mouth every morning with breakfast., Disp: 30 Tab, Rfl: 6  •  Dulaglutide (TRULICITY) 1.5 MG/0.5ML Solution Pen-injector, Inject 0.5 mL as instructed every 7 days., Disp: 4 PEN, Rfl: 6  •  LOVASTATIN PO, lovastatin, Disp: , Rfl:   •  Lancets Misc, 1 Applicator by Does not apply route 3 times a day., Disp: 100 Each, Rfl: 11  •  glucose blood (FREESTYLE LITE) strip, 1 Strip by Other route as needed., Disp: 100 Strip, Rfl: 6  •  Blood Glucose Monitoring Suppl (FREESTYLE FREEDOM LITE) w/Device Kit, 1 Kit by Does not apply route 3 times a day., Disp: 1 Kit, Rfl: 1  •  Lancets Misc, 1 Applicator by Does not apply route 3 times a day., Disp: 100 Each, Rfl: 11  •  bisacodyl (DULCOLAX) 10 MG Suppos, Insert 1 Suppository in rectum every 24 hours as needed (if sennosides, docusate, polyethylene glycol 3350, and/or magnesium hydroxide ineffective or not ordered)., Disp: , Rfl: 0  •  benzocaine-menthol (CEPACOL) 15-3.6 MG Lozenge, Spray 1 Lozenge in mouth/throat every 2 hours as needed (for sore throat)., Disp: , Rfl:   •  vitamin D 5000  "units Tab, Take 5,000 Units by mouth every day., Disp: 60 Tab, Rfl:   •  budesonide-formoterol (SYMBICORT) 160-4.5 MCG/ACT Aerosol, Inhale 2 Puffs by mouth 2 Times a Day., Disp: , Rfl:   •  losartan (COZAAR) 50 MG Tab, Take 1 Tab by mouth every day., Disp: 30 Tab, Rfl:   •  alprazolam (XANAX) 0.25 MG Tab, Take 1 Tab by mouth 2 times a day as needed for Anxiety., Disp: 30 Tab, Rfl: 0        Physical Examination:   Vital signs: /60 (BP Location: Left arm, Patient Position: Sitting)   Pulse 60   Ht 1.651 m (5' 5\")   Wt 48.1 kg (106 lb)   SpO2 93%   BMI 17.64 kg/m²   General: No distress, cooperative, well dressed and well nourished.   Eyes: No scleral icterus or discharge, No hyposphagma  ENMT: Normal on external inspection of nose, lips, No nasal drainage   Neck: No abnormal masses on inspection  Resp: Normal effort, Bilateral clear to auscultation, No wheezing, No rales  CVS: Regular rate and rhythm, S1 S2 normal, No murmur. No gallop  Extremities: No edema bilateral extremities  Neuro: Alert and oriented  Skin: No rash, No Ulcers  Psych: Normal mood and affect      Assessment and Plan:    1. Type 2 diabetes mellitus with hyperlipidemia (HCC)    She  is now on:  1.  Trulicity 1.5 once a week (Take on Monday's)  2.  Metformin 500mg ER one in AM one in PM (Stopped on 8/13/18)  3.  Jardiance 10mg one pill a day (3/25/19)  4.  Tresiba 10 units at night before bed  (Refusing)    Return in about 3 months (around 2/8/2020).    Thank you kindly for allowing me to participate in the diabetes care plan for this patient.    Martin Leblanc PA-C, BC-ADM  Board Certified - Advanced Diabetes Management  11/08/19    CC:   Parveen Barahona M.D.    "

## 2019-12-10 NOTE — TELEPHONE ENCOUNTER
**Last office visit 11/8/19 **    Was the patient seen in the last year in this department? Yes    Does patient have an active prescription for medications requested? Yes    Received Request Via: Patient    Day supply: 30

## 2020-02-13 ENCOUNTER — OFFICE VISIT (OUTPATIENT)
Dept: ENDOCRINOLOGY | Facility: MEDICAL CENTER | Age: 75
End: 2020-02-13
Payer: MEDICARE

## 2020-02-13 VITALS
WEIGHT: 113 LBS | HEART RATE: 95 BPM | BODY MASS INDEX: 18.83 KG/M2 | DIASTOLIC BLOOD PRESSURE: 60 MMHG | SYSTOLIC BLOOD PRESSURE: 108 MMHG | HEIGHT: 65 IN | OXYGEN SATURATION: 95 %

## 2020-02-13 DIAGNOSIS — E11.69 TYPE 2 DIABETES MELLITUS WITH HYPERLIPIDEMIA (HCC): ICD-10-CM

## 2020-02-13 DIAGNOSIS — E78.5 TYPE 2 DIABETES MELLITUS WITH HYPERLIPIDEMIA (HCC): ICD-10-CM

## 2020-02-13 LAB
HBA1C MFR BLD: 7.6 % (ref 0–5.6)
INT CON NEG: NEGATIVE
INT CON POS: POSITIVE

## 2020-02-13 PROCEDURE — 83036 HEMOGLOBIN GLYCOSYLATED A1C: CPT | Performed by: PHYSICIAN ASSISTANT

## 2020-02-13 PROCEDURE — 99214 OFFICE O/P EST MOD 30 MIN: CPT | Performed by: PHYSICIAN ASSISTANT

## 2020-02-13 RX ORDER — DULAGLUTIDE 1.5 MG/.5ML
0.5 INJECTION, SOLUTION SUBCUTANEOUS
Qty: 4 PEN | Refills: 11 | Status: SHIPPED | OUTPATIENT
Start: 2020-02-13

## 2020-02-13 NOTE — PROGRESS NOTES
Return to office Patient Consult Note  Referred by: Nadja Rob M.D.    Reason for consult: Diabetes Management Type 2    HPI:  Margy Boyer is a 74 y.o. old patient who is seeing us today for diabetes care.  This is a pleasant patient with diabetes and I appreciate the opportunity to participate in the care of this patient.    Labs of 2/13/2020 HbA1c is 7.6  Labs of 11/8/2019 HbA1c is 8.9  Labs of HbA1c on 3/25/19 is 8.2  Labs of 8/13/18 HbA1c is 7.2  Labs of 5/7/18 HbA1c is 6.9  2/5/18 HbA1c is 7.4  8/7/17 HbA1c is 7.1  2/12/17 HbA1c 12.9,  GFR 59  8/7/17 HbA1c is 7.2     8/25/17 GFR 54    BG Diary:2/13/2020  In the AM:  No log      1. Type 2 diabetes mellitus with hyperlipidemia (HCC)  She  is now on:  1.  Trulicity 1.5 once a week (Take on Monday's)  2.  Metformin 500mg ER one in AM one in PM (Stopped on 8/13/18)  3.  Jardiance 10mg one pill a day (3/25/19)  4.  Tresiba 10 units at night before bed  (Refusing)      2. Uncontrolled type 2 diabetes mellitus with hypoglycemia without coma (HCC)  Not at optimal control         ROS:   Constitutional: No night sweats.  Eyes:  No visual changes.  Cardiac: No chest pain, No palpitations or racing heart rate.  Resp: No shortness of breath, No cough,   Gi: No Diarrhea    All other systems were reviewed and were/are negative.      Past Medical History:  Patient Active Problem List    Diagnosis Date Noted   • Diabetes mellitus type II, uncontrolled (HCC) 04/25/2017   • Type 2 diabetes mellitus with hyperlipidemia (HCC) 04/25/2017   • Chronic low back pain 09/29/2016   • DDD (degenerative disc disease), lumbar 09/29/2016   • Spondylosis of lumbosacral region without myelopathy or radiculopathy 09/29/2016   • Chronic neck pain 09/29/2016   • Osteoarthritis of spine with radiculopathy, cervical region 09/29/2016   • DDD (degenerative disc disease), cervical 09/29/2016   • Muscle weakness of left upper extremity 09/29/2016   • Left hand pain 06/10/2015   •  Contracture of joint of hand 01/05/2015   • Fracture of metacarpal bone 12/23/2013       Past Surgical History:  Past Surgical History:   Procedure Laterality Date   • POSTERIOR CERVICAL FUSION O-ARM  11/8/2017    Procedure: CERVICAL FUSION POSTERIOR O-ARM C3-T1 INSTRUMENTED;  Surgeon: Fabiola Ferreira M.D.;  Location: SURGERY [facility];  Service: Neurosurgery   • CERVICAL LAMINECTOMY POSTERIOR  11/8/2017    Procedure: CERVICAL LAMINECTOMY POSTERIOR  C3-T1 DECOMPRESSIVE;  Surgeon: Fabiola Ferreira M.D.;  Location: SURGERY [facility];  Service: Neurosurgery   • FORAMINOTOMY Bilateral 11/8/2017    Procedure: FORAMINOTOMY;  Surgeon: Fabiola Ferreira M.D.;  Location: SURGERY [facility];  Service: Neurosurgery   • OTHER ORTHOPEDIC SURGERY  04/2013    left hand ORIF   • OTHER  1994    lumph nodes removed from rt axilla   • DILATION AND CURETTAGE  1971    for partial miscarriage   • GYN SURGERY      tubal ligation   • OTHER ORTHOPEDIC SURGERY  ?2012    Right knee arthroscopy   • OTHER ORTHOPEDIC SURGERY  2012/1974    bilat bunion removal       Allergies:  Aluminum; Asa [aspirin]; Celecoxib; Codeine; Other food; Pcn [penicillins]; Soap; Tetracycline; and Vioxx    Social History:  Social History     Socioeconomic History   • Marital status: Single     Spouse name: Not on file   • Number of children: Not on file   • Years of education: Not on file   • Highest education level: Not on file   Occupational History   • Not on file   Social Needs   • Financial resource strain: Not on file   • Food insecurity     Worry: Not on file     Inability: Not on file   • Transportation needs     Medical: Not on file     Non-medical: Not on file   Tobacco Use   • Smoking status: Never Smoker   • Smokeless tobacco: Never Used   Substance and Sexual Activity   • Alcohol use: No   • Drug use: No   • Sexual activity: Not on file   Lifestyle   • Physical activity     Days per week: Not on file     Minutes per session: Not on file    • Stress: Not on file   Relationships   • Social connections     Talks on phone: Not on file     Gets together: Not on file     Attends Voodoo service: Not on file     Active member of club or organization: Not on file     Attends meetings of clubs or organizations: Not on file     Relationship status: Not on file   • Intimate partner violence     Fear of current or ex partner: Not on file     Emotionally abused: Not on file     Physically abused: Not on file     Forced sexual activity: Not on file   Other Topics Concern   • Not on file   Social History Narrative   • Not on file       Family History:  History reviewed. No pertinent family history.    Medications:    Current Outpatient Medications:   •  Dulaglutide (TRULICITY) 1.5 MG/0.5ML Solution Pen-injector, Inject 0.5 mL as instructed every 7 days., Disp: 4 PEN, Rfl: 11  •  Empagliflozin 10 MG Tab, Take 1 tablet by mouth every morning with breakfast., Disp: 30 Tab, Rfl: 11  •  glucose blood (FREESTYLE LITE) strip, USE 1 STRIP TO CHECK GLUCOSE THREE TIMES DAILY BEFORE MEALS, Disp: 100 Strip, Rfl: 3  •  Insulin Pen Needle 32 G x 4 mm, 1 Applicator by Does not apply route 3 times a day., Disp: 100 Each, Rfl: 11  •  LOVASTATIN PO, lovastatin, Disp: , Rfl:   •  Lancets Misc, 1 Applicator by Does not apply route 3 times a day., Disp: 100 Each, Rfl: 11  •  glucose blood (FREESTYLE LITE) strip, 1 Strip by Other route as needed., Disp: 100 Strip, Rfl: 6  •  Blood Glucose Monitoring Suppl (FREESTYLE FREEDOM LITE) w/Device Kit, 1 Kit by Does not apply route 3 times a day., Disp: 1 Kit, Rfl: 1  •  Lancets Misc, 1 Applicator by Does not apply route 3 times a day., Disp: 100 Each, Rfl: 11  •  bisacodyl (DULCOLAX) 10 MG Suppos, Insert 1 Suppository in rectum every 24 hours as needed (if sennosides, docusate, polyethylene glycol 3350, and/or magnesium hydroxide ineffective or not ordered)., Disp: , Rfl: 0  •  benzocaine-menthol (CEPACOL) 15-3.6 MG Lozenge, Spray 1 Lozenge  "in mouth/throat every 2 hours as needed (for sore throat)., Disp: , Rfl:   •  vitamin D 5000 units Tab, Take 5,000 Units by mouth every day., Disp: 60 Tab, Rfl:   •  budesonide-formoterol (SYMBICORT) 160-4.5 MCG/ACT Aerosol, Inhale 2 Puffs by mouth 2 Times a Day., Disp: , Rfl:   •  losartan (COZAAR) 50 MG Tab, Take 1 Tab by mouth every day., Disp: 30 Tab, Rfl:   •  alprazolam (XANAX) 0.25 MG Tab, Take 1 Tab by mouth 2 times a day as needed for Anxiety., Disp: 30 Tab, Rfl: 0        Physical Examination:   Vital signs: /60 (BP Location: Left arm, Patient Position: Sitting)   Pulse 95   Ht 1.651 m (5' 5\")   Wt 51.3 kg (113 lb)   SpO2 95%   BMI 18.80 kg/m²   General: No distress, cooperative, well dressed and well nourished.   Eyes: No scleral icterus or discharge, No hyposphagma  ENMT: Normal on external inspection of nose, lips, No nasal drainage   Neck: No abnormal masses on inspection  Resp: Normal effort, Bilateral clear to auscultation, No wheezing, No rales  CVS: Regular rate and rhythm, S1 S2 normal, No murmur. No gallop  Extremities: No edema bilateral extremities  Neuro: Alert and oriented  Skin: No rash, No Ulcers  Psych: Normal mood and affect      Assessment and Plan:    1. Type 2 diabetes mellitus with hyperlipidemia (HCC)  She  is now on:  1.  Trulicity 1.5 once a week (Take on Monday's)  2.  Metformin 500mg ER one in AM one in PM (Stopped on 8/13/18)  3.  Jardiance 10mg one pill a day (3/25/19)  4.  Tresiba 10 units at night before bed  (Refusing)    Return in about 6 months (around 8/13/2020).      Thank you kindly for allowing me to participate in the diabetes care plan for this patient.    Martin Leblanc PA-C, BC-ADM  Board Certified - Advanced Diabetes Management  02/13/20    CC:   Nadja Rob M.D.    "

## 2020-05-22 ENCOUNTER — PATIENT OUTREACH (OUTPATIENT)
Dept: HEALTH INFORMATION MANAGEMENT | Facility: OTHER | Age: 75
End: 2020-05-22

## 2020-05-22 NOTE — PROGRESS NOTES
A 74-year-old female was a local transfer admission to [facility] from 3/24/2020 to 4/23/2020 to treat fracture of lower leg. The patient was discharged home. The patient was not under clinical case management.     The patient was ordered to follow-up with her PCP for a hospital follow-up. The patient had the following appointments:     1) 4/28/2020 @ 9:00 [facility] - start of services was confirmed as kept     2) 5/15/2020 @ 8:00 [facility] - CONFIRMED AS KEPT    The Patient Navigator was able to successfully engage with patient post-discharge. Throughout the majority of the case, patient sent Navigator's phone calls to voicemail.

## (undated) DEVICE — BLADE SURGICAL #10 - (50/BX)

## (undated) DEVICE — GLOVE BIOGEL SZ 6.5 SURGICAL PF LTX (50PR/BX 4BX/CA)

## (undated) DEVICE — SUTURE 0 VICRYL PLUS CT-1 - 36 INCH (36/BX)

## (undated) DEVICE — DERMABOND ADVANCED - (12EA/BX)

## (undated) DEVICE — GLOVE BIOGEL PI INDICATOR SZ 7.0 SURGICAL PF LF - (50/BX 4BX/CA)

## (undated) DEVICE — DRILL BIT NAVIGATED 2.4MM

## (undated) DEVICE — MASK ANESTHESIA ADULT  - (100/CA)

## (undated) DEVICE — SUCTION INSTRUMENT YANKAUER BULBOUS TIP W/O VENT (50EA/CA)

## (undated) DEVICE — MIDAS LUBRICATOR DIFFUSER PACK (4EA/CA)

## (undated) DEVICE — SEALER BIPOLAR 2.3 AQUAMANTYS

## (undated) DEVICE — PUMP ON-Q 270 DUAL 2ML FIXED RATE (5EA/CA)

## (undated) DEVICE — SODIUM CHL IRRIGATION 0.9% 1000ML (12EA/CA)

## (undated) DEVICE — SPONGE GAUZESTER 4 X 4 4PLY - (128PK/CA)

## (undated) DEVICE — SUTURE GENERAL

## (undated) DEVICE — GLOVE BIOGEL INDICATOR SZ 7SURGICAL PF LTX - (50/BX 4BX/CA)

## (undated) DEVICE — DRESSING XEROFORM 1X8 - (50/BX 4BX/CA)

## (undated) DEVICE — TUBE E-T HI-LO CUFF 7.0MM (10EA/PK)

## (undated) DEVICE — KIT ROOM DECONTAMINATION

## (undated) DEVICE — TRAY SURESTEP FOLEY TEMP SENSING 16FR (10EA/CA) ORDER  #18764 FOR TEMP FOLEY ONLY

## (undated) DEVICE — KIT EVACUATER 3 SPRING PVC LF 1/8 DRAIN SIZE (10EA/CA)"

## (undated) DEVICE — BONE MILL BM210

## (undated) DEVICE — TOOL DISSECT MATCH HEAD

## (undated) DEVICE — GLOVE BIOGEL SZ 8 SURGICAL PF LTX - (50PR/BX 4BX/CA)

## (undated) DEVICE — SLEEVE, VASO, THIGH, MED

## (undated) DEVICE — BIT DRILL 2.4MM

## (undated) DEVICE — PATTIES SURG X-RAYCOTTONOID - 1/2 X 3 IN (200/CA)

## (undated) DEVICE — SPHERE NAVIGATION STEALTH (5EA/TY 12TY/PK)

## (undated) DEVICE — LACTATED RINGERS INJ 1000 ML - (14EA/CA 60CA/PF)

## (undated) DEVICE — LACTATED RINGERS INJ. 500 ML - (24EA/CA)

## (undated) DEVICE — CHLORAPREP 26 ML APPLICATOR - ORANGE TINT(25/CA)

## (undated) DEVICE — PROBE PRASS STAND STIMULATING (5EA/PK)

## (undated) DEVICE — INTRAOP NEURO IN OR 1:1 PER 15 MIN

## (undated) DEVICE — PIN HEAD MAYFIELD DISP. (3EA/PK 12PK/BX)

## (undated) DEVICE — CATHETER ON-Q SILVER SOAKER 5IN  (5EA/CA)  - SUB ORDER #4428

## (undated) DEVICE — SPHERE NAVIGATION STEALTH (4EA/PK12PK/BX)

## (undated) DEVICE — DRAPE SMALL TOWEL 12 X 18 IN - (10/BX)

## (undated) DEVICE — BLADE CLIPPER FITS 2501 CLIPPER (BLUE)  (20EA/CA)

## (undated) DEVICE — SUTURE 0 VICRYL PLUS CT-1 - 8 X 18 INCH (12/BX)

## (undated) DEVICE — DRAPE LAPAROTOMY T SHEET - (12EA/CA)

## (undated) DEVICE — ELECTRODE 850 FOAM ADHESIVE - HYDROGEL RADIOTRNSPRNT (50/PK)

## (undated) DEVICE — TUBING CLEARLINK DUO-VENT - C-FLO (48EA/CA)

## (undated) DEVICE — TOWELS CLOTH SURGICAL - (4/PK 20PK/CA)

## (undated) DEVICE — GLOVE BIOGEL INDICATOR SZ 8 SURGICAL PF LTX - (50/BX 4BX/CA)

## (undated) DEVICE — SET EXTENSION WITH 2 PORTS (48EA/CA) ***PART #2C8610 IS A SUBSTITUTE*****

## (undated) DEVICE — NEPTUNE 4 PORT MANIFOLD - (20/PK)

## (undated) DEVICE — DEVICE MONOPOLAR RF PEAK PLASMABLADE 3.0S

## (undated) DEVICE — DRAPE SURG STERI-DRAPE 7X11OD - (40EA/CA)

## (undated) DEVICE — PACK NEURO - (2EA/CA)

## (undated) DEVICE — DRAPE MAYO STAND - (30/CA)

## (undated) DEVICE — GOWN SURGEONS X-LARGE - DISP. (30/CA)

## (undated) DEVICE — GOWN WARMING STANDARD FLEX - (30/CA)

## (undated) DEVICE — SYRINGE SAFETY 10 ML 18 GA X 1 1/2 BLUNT LL (100/BX 4BX/CA)

## (undated) DEVICE — SUTURE 2-0 VICRYL PLUS CT-1 - 8 X 18 INCH(12/BX)

## (undated) DEVICE — SENSOR SPO2 NEO LNCS ADHESIVE (20/BX) SEE USER NOTES

## (undated) DEVICE — PROTECTOR ULNA NERVE - (36PR/CA)

## (undated) DEVICE — KIT SURGIFLO W/OUT THROMBIN - (6EA/CA)

## (undated) DEVICE — COVER LIGHT HANDLE FLEXIBLE - SOFT (2EA/PK 80PK/CA)

## (undated) DEVICE — KIT ANESTHESIA W/CIRCUIT & 3/LT BAG W/FILTER (20EA/CA)

## (undated) DEVICE — DRAPE LARGE 3 QUARTER - (20/CA)

## (undated) DEVICE — CANISTER SUCTION 3000ML MECHANICAL FILTER AUTO SHUTOFF MEDI-VAC NONSTERILE LF DISP  (40EA/CA)

## (undated) DEVICE — TUBING C&T SET FLYING LEADS DRAIN TUBING (10EA/BX)

## (undated) DEVICE — GOWN SURGEONS LARGE - (32/CA)